# Patient Record
Sex: MALE | Race: WHITE | NOT HISPANIC OR LATINO | Employment: OTHER | URBAN - METROPOLITAN AREA
[De-identification: names, ages, dates, MRNs, and addresses within clinical notes are randomized per-mention and may not be internally consistent; named-entity substitution may affect disease eponyms.]

---

## 2019-05-25 ENCOUNTER — ANCILLARY PROCEDURE (OUTPATIENT)
Dept: GENERAL RADIOLOGY | Facility: CLINIC | Age: 64
End: 2019-05-25
Attending: FAMILY MEDICINE
Payer: COMMERCIAL

## 2019-05-25 ENCOUNTER — OFFICE VISIT (OUTPATIENT)
Dept: URGENT CARE | Facility: URGENT CARE | Age: 64
End: 2019-05-25
Payer: COMMERCIAL

## 2019-05-25 ENCOUNTER — TELEPHONE (OUTPATIENT)
Dept: URGENT CARE | Facility: URGENT CARE | Age: 64
End: 2019-05-25

## 2019-05-25 VITALS
TEMPERATURE: 96.9 F | BODY MASS INDEX: 25.06 KG/M2 | OXYGEN SATURATION: 97 % | WEIGHT: 169.7 LBS | HEART RATE: 73 BPM | SYSTOLIC BLOOD PRESSURE: 98 MMHG | DIASTOLIC BLOOD PRESSURE: 62 MMHG

## 2019-05-25 DIAGNOSIS — S93.504A SPRAIN OF FIFTH TOE OF RIGHT FOOT, INITIAL ENCOUNTER: ICD-10-CM

## 2019-05-25 DIAGNOSIS — M79.674 PAIN OF TOE OF RIGHT FOOT: ICD-10-CM

## 2019-05-25 DIAGNOSIS — M79.674 PAIN OF TOE OF RIGHT FOOT: Primary | ICD-10-CM

## 2019-05-25 DIAGNOSIS — L03.031 CELLULITIS OF FIFTH TOE, RIGHT: ICD-10-CM

## 2019-05-25 PROCEDURE — 99203 OFFICE O/P NEW LOW 30 MIN: CPT | Performed by: FAMILY MEDICINE

## 2019-05-25 PROCEDURE — 73660 X-RAY EXAM OF TOE(S): CPT | Mod: RT

## 2019-05-25 RX ORDER — IPRATROPIUM BROMIDE 17 UG/1
AEROSOL, METERED RESPIRATORY (INHALATION)
Refills: 0 | COMMUNITY
Start: 2019-03-26 | End: 2022-10-05

## 2019-05-25 RX ORDER — CEPHALEXIN 500 MG/1
500 CAPSULE ORAL 3 TIMES DAILY
Qty: 21 CAPSULE | Refills: 0 | Status: SHIPPED | OUTPATIENT
Start: 2019-05-25 | End: 2019-06-01

## 2019-05-25 RX ORDER — LATANOPROST 50 UG/ML
1 SOLUTION/ DROPS OPHTHALMIC EVERY EVENING
COMMUNITY
Start: 2019-05-24 | End: 2023-01-04

## 2019-05-26 NOTE — TELEPHONE ENCOUNTER
SUBJECTIVE:  The radiology report on the patient's May 25, 2019, right fifth toe X-rays showed no fracture.      PLAN:  Please notify patient of this normal result.      Bernard Hardin MD

## 2019-05-26 NOTE — PATIENT INSTRUCTIONS
Elevate the right foot above the level of the heart to decrease pain and swelling at the right pinky toe.      Place ice onto the painful right pinky toe.      Tylenol, Ibuprofen for pain relief.      The radiology report results are pending.  If the report finds a fracture, then tape the pinky toe to the neighboring toe.  You can apply ice to reduce swelling.      follow up with your primary care provider if not better in 2-3 weeks.

## 2019-05-26 NOTE — PROGRESS NOTES
SUBJECTIVE:  Chief Complaint   Patient presents with     Urgent Care     Toe Injury     jammed toe a few weeks ago, swollen little toe and pain on right foot TX: cindy oakes     Raúl Marie is a 63 year old male presents with a chief complaint of a persistently  swelling and pain at the right fifth toe ever since jamming that toe a few weeks ago.  There was initially purplish discoloration, but this purple color disappeared one day after applying herbs.    The injury occurred three weeks ago.   The injury happened while at home and while the patient was barefoot. How: Patient 's right foot was stepping over something and hit something.  .  The patient complained of moderate pain when pressure is applied and mild pain without pressure and has not had decreased ROM.  Pain exacerbated by applying pressure over the fifth toe.  .  Relieved by none. .  He treated it initially with herbs and Epsom salt soaks. This is  the first time this type of injury has occurred to this patient.     No pus/discharge/blood.      Past Medical History;    Glaucoma (open-angle)    Current Outpatient Medications   Medication Sig Dispense Refill     ATROVENT HFA 17 MCG/ACT inhaler   0     latanoprost (XALATAN) 0.005 % ophthalmic solution        Social History     Tobacco Use     Smoking status: Never Smoker     Smokeless tobacco: Never Used   Substance Use Topics     Alcohol use: Yes     Social History:  Patient cleans houses.      Family History:    Diabetes, Alzheimers, Dementia    ROS:    INTEGUMENTARY/SKIN: positive for redness, edema, tenderness at the right fifth toe.    MUSCULOSKELETAL: POSITIVE  for swelling, redness at the right fifth toe.      EXAM:   BP 98/62   Pulse 73   Temp 96.9  F (36.1  C) (Tympanic)   Wt 77 kg (169 lb 11.2 oz)   SpO2 97%   BMI 25.06 kg/m    Gen: healthy,alert,no distress  Extremity: Right fifth toe has erythema, swelling, warmth at the entire right toe.  .   There is not compromise to the distal  circulation.  ROM is normal at the right fifth toe.   GAIT:  within normal limits.     X-RAY was done.  I viewed all of the X-ray images during this clinic encounter.  X-rays of the right fifth toe showed a possible radiolucent area at the distal phalanx on one view.  The other views showed no obvious fractures nor dislocation.     ASSESSMENT:   Right Fifth Toe Pain.  Differential diagnosis includes sprain of the toe, possible fracture of the distal phalanx, cellulitis of the fifth toe.    PLAN:  The radiology report is pending.  If report shows fracture, then do anne taping for the next three weeks.      Apply ice  Elevate the right foot above the level of the heart  Tylenol, Ibuprofen  follow up with the primary care provider if not better in 2-3 weeks    Bernard Hardin MD

## 2019-05-26 NOTE — TELEPHONE ENCOUNTER
Called and spoke with patient who confirmed understanding.    Quirino Rey MA 11:01 AM 5/26/2019

## 2022-10-05 ENCOUNTER — APPOINTMENT (OUTPATIENT)
Dept: GENERAL RADIOLOGY | Facility: CLINIC | Age: 67
DRG: 394 | End: 2022-10-05
Attending: EMERGENCY MEDICINE
Payer: MEDICARE

## 2022-10-05 ENCOUNTER — HOSPITAL ENCOUNTER (INPATIENT)
Facility: CLINIC | Age: 67
LOS: 6 days | Discharge: HOME | DRG: 394 | End: 2022-10-11
Attending: EMERGENCY MEDICINE | Admitting: INTERNAL MEDICINE
Payer: MEDICARE

## 2022-10-05 DIAGNOSIS — L03.114 CELLULITIS OF LEFT UPPER EXTREMITY: Primary | ICD-10-CM

## 2022-10-05 DIAGNOSIS — K62.5 RECTAL BLEEDING: ICD-10-CM

## 2022-10-05 DIAGNOSIS — D64.9 ANEMIA, UNSPECIFIED TYPE: ICD-10-CM

## 2022-10-05 LAB
ABO/RH(D): NORMAL
ANION GAP SERPL CALCULATED.3IONS-SCNC: 5 MMOL/L (ref 3–14)
ANTIBODY SCREEN: NEGATIVE
BLD PROD TYP BPU: NORMAL
BLOOD COMPONENT TYPE: NORMAL
BUN SERPL-MCNC: 24 MG/DL (ref 7–30)
CALCIUM SERPL-MCNC: 8.5 MG/DL (ref 8.5–10.1)
CHLORIDE BLD-SCNC: 105 MMOL/L (ref 94–109)
CO2 SERPL-SCNC: 28 MMOL/L (ref 20–32)
CODING SYSTEM: NORMAL
CREAT SERPL-MCNC: 1.19 MG/DL (ref 0.66–1.25)
CROSSMATCH: NORMAL
ERYTHROCYTE [DISTWIDTH] IN BLOOD BY AUTOMATED COUNT: 18.9 % (ref 10–15)
FERRITIN SERPL-MCNC: 3 NG/ML (ref 26–388)
GFR SERPL CREATININE-BSD FRML MDRD: 67 ML/MIN/1.73M2
GLUCOSE BLD-MCNC: 101 MG/DL (ref 70–99)
HCT VFR BLD AUTO: 16.6 % (ref 40–53)
HGB BLD-MCNC: 4.6 G/DL (ref 13.3–17.7)
HOLD SPECIMEN: NORMAL
INR PPP: 1.1 (ref 0.85–1.15)
IRON SATN MFR SERPL: 2 % (ref 15–46)
IRON SERPL-MCNC: 8 UG/DL (ref 35–180)
ISSUE DATE AND TIME: NORMAL
MCH RBC QN AUTO: 19.1 PG (ref 26.5–33)
MCHC RBC AUTO-ENTMCNC: 27.7 G/DL (ref 31.5–36.5)
MCV RBC AUTO: 69 FL (ref 78–100)
PLATELET # BLD AUTO: 358 10E3/UL (ref 150–450)
POTASSIUM BLD-SCNC: 3.6 MMOL/L (ref 3.4–5.3)
RBC # BLD AUTO: 2.41 10E6/UL (ref 4.4–5.9)
SARS-COV-2 RNA RESP QL NAA+PROBE: NEGATIVE
SODIUM SERPL-SCNC: 138 MMOL/L (ref 133–144)
SPECIMEN EXPIRATION DATE: NORMAL
TIBC SERPL-MCNC: 494 UG/DL (ref 240–430)
TROPONIN I SERPL HS-MCNC: 4 NG/L
UNIT ABO/RH: NORMAL
UNIT NUMBER: NORMAL
UNIT STATUS: NORMAL
UNIT TYPE ISBT: 5100
WBC # BLD AUTO: 5.1 10E3/UL (ref 4–11)

## 2022-10-05 PROCEDURE — 86923 COMPATIBILITY TEST ELECTRIC: CPT | Performed by: INTERNAL MEDICINE

## 2022-10-05 PROCEDURE — 36415 COLL VENOUS BLD VENIPUNCTURE: CPT | Performed by: EMERGENCY MEDICINE

## 2022-10-05 PROCEDURE — 99285 EMERGENCY DEPT VISIT HI MDM: CPT | Mod: 25

## 2022-10-05 PROCEDURE — 120N000001 HC R&B MED SURG/OB

## 2022-10-05 PROCEDURE — 96374 THER/PROPH/DIAG INJ IV PUSH: CPT

## 2022-10-05 PROCEDURE — C9113 INJ PANTOPRAZOLE SODIUM, VIA: HCPCS | Performed by: EMERGENCY MEDICINE

## 2022-10-05 PROCEDURE — 250N000013 HC RX MED GY IP 250 OP 250 PS 637: Performed by: INTERNAL MEDICINE

## 2022-10-05 PROCEDURE — C9803 HOPD COVID-19 SPEC COLLECT: HCPCS

## 2022-10-05 PROCEDURE — 85610 PROTHROMBIN TIME: CPT | Performed by: EMERGENCY MEDICINE

## 2022-10-05 PROCEDURE — 71046 X-RAY EXAM CHEST 2 VIEWS: CPT

## 2022-10-05 PROCEDURE — 83550 IRON BINDING TEST: CPT | Performed by: EMERGENCY MEDICINE

## 2022-10-05 PROCEDURE — P9016 RBC LEUKOCYTES REDUCED: HCPCS | Performed by: EMERGENCY MEDICINE

## 2022-10-05 PROCEDURE — 80048 BASIC METABOLIC PNL TOTAL CA: CPT | Performed by: EMERGENCY MEDICINE

## 2022-10-05 PROCEDURE — 250N000011 HC RX IP 250 OP 636: Performed by: EMERGENCY MEDICINE

## 2022-10-05 PROCEDURE — 86923 COMPATIBILITY TEST ELECTRIC: CPT | Performed by: EMERGENCY MEDICINE

## 2022-10-05 PROCEDURE — 86901 BLOOD TYPING SEROLOGIC RH(D): CPT | Performed by: EMERGENCY MEDICINE

## 2022-10-05 PROCEDURE — 99223 1ST HOSP IP/OBS HIGH 75: CPT | Mod: AI | Performed by: INTERNAL MEDICINE

## 2022-10-05 PROCEDURE — 82728 ASSAY OF FERRITIN: CPT | Performed by: EMERGENCY MEDICINE

## 2022-10-05 PROCEDURE — 85027 COMPLETE CBC AUTOMATED: CPT | Performed by: EMERGENCY MEDICINE

## 2022-10-05 PROCEDURE — 36430 TRANSFUSION BLD/BLD COMPNT: CPT

## 2022-10-05 PROCEDURE — 84484 ASSAY OF TROPONIN QUANT: CPT | Performed by: EMERGENCY MEDICINE

## 2022-10-05 PROCEDURE — U0003 INFECTIOUS AGENT DETECTION BY NUCLEIC ACID (DNA OR RNA); SEVERE ACUTE RESPIRATORY SYNDROME CORONAVIRUS 2 (SARS-COV-2) (CORONAVIRUS DISEASE [COVID-19]), AMPLIFIED PROBE TECHNIQUE, MAKING USE OF HIGH THROUGHPUT TECHNOLOGIES AS DESCRIBED BY CMS-2020-01-R: HCPCS | Performed by: EMERGENCY MEDICINE

## 2022-10-05 RX ORDER — LATANOPROST 50 UG/ML
1 SOLUTION/ DROPS OPHTHALMIC DAILY
Status: DISCONTINUED | OUTPATIENT
Start: 2022-10-06 | End: 2022-10-06

## 2022-10-05 RX ORDER — ONDANSETRON 2 MG/ML
4 INJECTION INTRAMUSCULAR; INTRAVENOUS EVERY 6 HOURS PRN
Status: DISCONTINUED | OUTPATIENT
Start: 2022-10-05 | End: 2022-10-07

## 2022-10-05 RX ORDER — ACETAMINOPHEN 650 MG/1
650 SUPPOSITORY RECTAL EVERY 6 HOURS PRN
Status: DISCONTINUED | OUTPATIENT
Start: 2022-10-05 | End: 2022-10-11 | Stop reason: HOSPADM

## 2022-10-05 RX ORDER — TAMSULOSIN HYDROCHLORIDE 0.4 MG/1
0.4 CAPSULE ORAL DAILY
COMMUNITY

## 2022-10-05 RX ORDER — POLYETHYLENE GLYCOL 3350 17 G/17G
17 POWDER, FOR SOLUTION ORAL DAILY PRN
Status: DISCONTINUED | OUTPATIENT
Start: 2022-10-05 | End: 2022-10-11 | Stop reason: HOSPADM

## 2022-10-05 RX ORDER — TAMSULOSIN HYDROCHLORIDE 0.4 MG/1
0.4 CAPSULE ORAL AT BEDTIME
Status: DISCONTINUED | OUTPATIENT
Start: 2022-10-05 | End: 2022-10-11 | Stop reason: HOSPADM

## 2022-10-05 RX ORDER — ONDANSETRON 4 MG/1
4 TABLET, ORALLY DISINTEGRATING ORAL EVERY 6 HOURS PRN
Status: DISCONTINUED | OUTPATIENT
Start: 2022-10-05 | End: 2022-10-07

## 2022-10-05 RX ORDER — BISACODYL 10 MG
10 SUPPOSITORY, RECTAL RECTAL DAILY PRN
Status: DISCONTINUED | OUTPATIENT
Start: 2022-10-05 | End: 2022-10-11 | Stop reason: HOSPADM

## 2022-10-05 RX ORDER — ACETAMINOPHEN 325 MG/1
650 TABLET ORAL EVERY 6 HOURS PRN
Status: DISCONTINUED | OUTPATIENT
Start: 2022-10-05 | End: 2022-10-11 | Stop reason: HOSPADM

## 2022-10-05 RX ORDER — LIDOCAINE 40 MG/G
CREAM TOPICAL
Status: DISCONTINUED | OUTPATIENT
Start: 2022-10-05 | End: 2022-10-11 | Stop reason: HOSPADM

## 2022-10-05 RX ADMIN — PANTOPRAZOLE SODIUM 80 MG: 40 INJECTION, POWDER, FOR SOLUTION INTRAVENOUS at 19:46

## 2022-10-05 RX ADMIN — TAMSULOSIN HYDROCHLORIDE 0.4 MG: 0.4 CAPSULE ORAL at 22:46

## 2022-10-05 ASSESSMENT — ENCOUNTER SYMPTOMS
FATIGUE: 1
UNEXPECTED WEIGHT CHANGE: 1
SHORTNESS OF BREATH: 1
WEAKNESS: 1
FREQUENCY: 0
FEVER: 0
ABDOMINAL PAIN: 0
DYSURIA: 0
APPETITE CHANGE: 1
CHILLS: 0
COUGH: 1
BLOOD IN STOOL: 1

## 2022-10-05 ASSESSMENT — ACTIVITIES OF DAILY LIVING (ADL)
ADLS_ACUITY_SCORE: 35

## 2022-10-05 NOTE — ED PROVIDER NOTES
History   Chief Complaint:  Abnormal Labs       HPI   Raúl Marie is a 67 year old male with history of hemorrhoids and hyperlipidemia who presents with a hemoglobin of 4.2 drawn earlier today in urgent care. Per chart review he was seen there for two weeks of dyspnea, fatigue, and weakness. He also has had a history of bleeding hemorrhoids which have recurred over the past two weeks following 6 months without symptoms. These produce enough blood with bowel movements to fill the toilet, but he has only had very mild bleeding today. He has also had a recent loss of appetite and estimates he has lost about 7 pounds this month. He also reports a cough for the past 2 weeks and chest pain. He is also very pale appearing. He denies fever, chills, dysuria, frequency, or abdominal pain. He has been seeing a homeopath who as been changing his remedies for his hemorrhoids. He denies any tobacco, alcohol, or drug use.     [facility] urgent care labs 10/5/2022:  HGB: 4.2  MCV: 68.6    Review of Systems   Constitutional: Positive for appetite change, fatigue and unexpected weight change. Negative for chills and fever.   Respiratory: Positive for cough and shortness of breath.    Cardiovascular: Positive for chest pain.   Gastrointestinal: Positive for blood in stool. Negative for abdominal pain.   Genitourinary: Negative for dysuria and frequency.   Skin: Positive for pallor.   Neurological: Positive for weakness.   All other systems reviewed and are negative.      Allergies:  Chlorpheniramine-Phenylephrine    Medications:  Tamsulosin   Atrovent   Latanoprost   Cholecalciferol     Past Medical History:     Hyperlipidemia   Hemorrhoids   Primary open angle glaucoma  Moderate episode of major depressive disorder   Primary open angle glaucoma  Vitamin D deficiency   Glomerulonephritis  Chronic kidney disease stage 3   Bronchitis  IgA nephropathy   Shingles   Calculus of kidney     Past Surgical History:     Tonsillectomy and adenoidectomy   Appendectomy   Cysto with urethral dilation   MS laser surgery of eye   Kidney stone surgery     Family History:    Father: dementia, diabetes  Mother: dementia     Social History:  The patient presents to the ED with family member.  The patient presents to the ED by means of car.     Physical Exam     Patient Vitals for the past 24 hrs:   BP Temp Temp src Pulse Resp SpO2   10/05/22 1901 -- 97.4  F (36.3  C) Oral -- -- --   10/05/22 1740 130/62 -- -- 80 22 100 %       Physical Exam  Constitutional: Well developed, nontox appearance  Head: Atraumatic.   Mouth/Throat: Oropharynx is clear and moist.   Neck:  no stridor  Eyes: no scleral icterus  Cardiovascular: RRR, 2+ bilat radial pulses  Pulmonary/Chest: nml resp effort, Clear BS bilat  Abdominal: ND, soft, NT, no rebound or guarding   Rectal: no active bleeding externally or on CARINE, no melena  Ext: Warm, well perfused, no edema  Neurological: A&O, symmetric facies, moves ext x4  Skin: Skin is warm and dry.  Significant pallor  Psychiatric: Behavior is normal. Thought content normal.   Nursing note and vitals reviewed.    Emergency Department Course     ECG Urgent [facility]  ECG taken at 1459, ECG read at 1855  Normal sinus rhythm   Normal ECG    QRS: 102  QT/QTcBaz 386/442  MS: 180   P: 126  RR/PP: 754/759  P/QRS/T: 40/23/40      Imaging:  Chest XR,  PA & LAT   Final Result   IMPRESSION: Heart size and pulmonary vascularity normal. The lungs are clear. Mild thoracolumbar spinal curve.        Report per radiology    Laboratory:  Labs Ordered and Resulted from Time of ED Arrival to Time of ED Departure   BASIC METABOLIC PANEL - Abnormal       Result Value    Sodium 138      Potassium 3.6      Chloride 105      Carbon Dioxide (CO2) 28      Anion Gap 5      Urea Nitrogen 24      Creatinine 1.19      Calcium 8.5      Glucose 101 (*)     GFR Estimate 67     CBC WITH PLATELETS AND REFLEX TO IRON STUDIES - Abnormal    WBC Count  5.1      RBC Count 2.41 (*)     Hemoglobin 4.6 (*)     Hematocrit 16.6 (*)     MCV 69 (*)     MCH 19.1 (*)     MCHC 27.7 (*)     RDW 18.9 (*)     Platelet Count 358     INR - Normal    INR 1.10     TROPONIN I - Normal    Troponin I High Sensitivity 4     IRON AND IRON BINDING CAPACITY   FERRITIN   COVID-19 VIRUS (CORONAVIRUS) BY PCR   TYPE AND SCREEN, ADULT    ABO/RH(D) O POS      Antibody Screen Negative      SPECIMEN EXPIRATION DATE      PREPARE RED BLOOD CELLS (UNIT)    Blood Component Type Red Blood Cells      Product Code D2130C27      Unit Status Ready for issue      Unit Number [id number]      CROSSMATCH Compatible      CODING SYSTEM [id number]     PREPARE RED BLOOD CELLS (UNIT)    Blood Component Type Red Blood Cells      Product Code S5367M17      Unit Status Ready for issue      Unit Number [id number]      CROSSMATCH Compatible      CODING SYSTEM [id number]     PREPARE RED BLOOD CELLS (UNIT)    Blood Component Type Red Blood Cells      Product Code V5465X47      Unit Status Transfused      Unit Number [id number]      CROSSMATCH Compatible      CODING SYSTEM [id number]      ISSUE DATE AND TIME [id number]      UNIT ABO/RH O+      UNIT TYPE ISBT 5100     PREPARE RED BLOOD CELLS (UNIT)   TRANSFUSE RED BLOOD CELLS (UNIT)   ABO/RH TYPE AND SCREEN   CBC WITH PLATELETS AND REFLEX TO IRON STUDIES        Emergency Department Course:  Reviewed:  I reviewed nursing notes, vitals, past medical history and Care Everywhere    Assessments:   I obtained history and examined the patient as noted above.    I rechecked the patient and performed rectal exam.     Consults:   I consulted the hospitalist Dr. Casey regarding admission to the hospital.     Interventions:   Pantoprazole 80 mg IV    Disposition:  The patient was admitted to the hospital under the care of Dr. Casey.     Impression & Plan     Medical Decision Makin year old male presenting w/ anemia noted on outpatient  labs     DDx includes iron deficiency anemia, anemia NOS, lower GI bleed, external hemorrhoids.  Labs significant for severe anemia.  Patient was consented for blood transfusion which was ordered in the emergency department.  He is subsequently admitted to the hospital service for further evaluation management including likely GI consult.  PPI infusion ordered after discussion with hospitalist.  Patient counseled on all results, disposition and diagnosis.  They are understanding and agreeable to plan. Patient admitted in guarded condition.      Critical care time: 30 minutes excluding procedures    Diagnosis:    ICD-10-CM    1. Anemia, unspecified type  D64.9    2. Rectal bleeding  K62.5        Scribe Disclosure:  I, Joshua Kjer, am serving as a scribe at 6:15 PM on 10/5/2022 to document services personally performed by Danny Che MD based on my observations and the provider's statements to me.            Danny Che MD  10/05/22 2046

## 2022-10-06 LAB
ERYTHROCYTE [DISTWIDTH] IN BLOOD BY AUTOMATED COUNT: 20.6 % (ref 10–15)
HCT VFR BLD AUTO: 23 % (ref 40–53)
HGB BLD-MCNC: 7.1 G/DL (ref 13.3–17.7)
HGB BLD-MCNC: 7.4 G/DL (ref 13.3–17.7)
HGB BLD-MCNC: 7.4 G/DL (ref 13.3–17.7)
MCH RBC QN AUTO: 22.8 PG (ref 26.5–33)
MCHC RBC AUTO-ENTMCNC: 30.9 G/DL (ref 31.5–36.5)
MCV RBC AUTO: 74 FL (ref 78–100)
PLATELET # BLD AUTO: 294 10E3/UL (ref 150–450)
POTASSIUM BLD-SCNC: 4 MMOL/L (ref 3.4–5.3)
RBC # BLD AUTO: 3.11 10E6/UL (ref 4.4–5.9)
WBC # BLD AUTO: 6.1 10E3/UL (ref 4–11)

## 2022-10-06 PROCEDURE — P9016 RBC LEUKOCYTES REDUCED: HCPCS | Performed by: EMERGENCY MEDICINE

## 2022-10-06 PROCEDURE — 36415 COLL VENOUS BLD VENIPUNCTURE: CPT | Performed by: INTERNAL MEDICINE

## 2022-10-06 PROCEDURE — 84132 ASSAY OF SERUM POTASSIUM: CPT | Performed by: INTERNAL MEDICINE

## 2022-10-06 PROCEDURE — 85018 HEMOGLOBIN: CPT | Performed by: PHYSICIAN ASSISTANT

## 2022-10-06 PROCEDURE — 250N000011 HC RX IP 250 OP 636: Performed by: INTERNAL MEDICINE

## 2022-10-06 PROCEDURE — 250N000013 HC RX MED GY IP 250 OP 250 PS 637: Performed by: PHYSICIAN ASSISTANT

## 2022-10-06 PROCEDURE — 250N000013 HC RX MED GY IP 250 OP 250 PS 637: Performed by: INTERNAL MEDICINE

## 2022-10-06 PROCEDURE — 85014 HEMATOCRIT: CPT | Performed by: INTERNAL MEDICINE

## 2022-10-06 PROCEDURE — 99232 SBSQ HOSP IP/OBS MODERATE 35: CPT | Performed by: INTERNAL MEDICINE

## 2022-10-06 PROCEDURE — 36415 COLL VENOUS BLD VENIPUNCTURE: CPT | Performed by: PHYSICIAN ASSISTANT

## 2022-10-06 PROCEDURE — 120N000001 HC R&B MED SURG/OB

## 2022-10-06 PROCEDURE — C9113 INJ PANTOPRAZOLE SODIUM, VIA: HCPCS | Performed by: INTERNAL MEDICINE

## 2022-10-06 RX ORDER — NALOXONE HYDROCHLORIDE 0.4 MG/ML
0.4 INJECTION, SOLUTION INTRAMUSCULAR; INTRAVENOUS; SUBCUTANEOUS
Status: DISCONTINUED | OUTPATIENT
Start: 2022-10-06 | End: 2022-10-11 | Stop reason: HOSPADM

## 2022-10-06 RX ORDER — NALOXONE HYDROCHLORIDE 0.4 MG/ML
0.2 INJECTION, SOLUTION INTRAMUSCULAR; INTRAVENOUS; SUBCUTANEOUS
Status: DISCONTINUED | OUTPATIENT
Start: 2022-10-06 | End: 2022-10-11 | Stop reason: HOSPADM

## 2022-10-06 RX ORDER — POLYETHYLENE GLYCOL 3350 17 G/17G
238 POWDER, FOR SOLUTION ORAL ONCE
Status: COMPLETED | OUTPATIENT
Start: 2022-10-06 | End: 2022-10-06

## 2022-10-06 RX ORDER — LATANOPROST 50 UG/ML
1 SOLUTION/ DROPS OPHTHALMIC AT BEDTIME
Status: DISCONTINUED | OUTPATIENT
Start: 2022-10-06 | End: 2022-10-11 | Stop reason: HOSPADM

## 2022-10-06 RX ORDER — OXYCODONE HYDROCHLORIDE 5 MG/1
5 TABLET ORAL EVERY 4 HOURS PRN
Status: DISCONTINUED | OUTPATIENT
Start: 2022-10-06 | End: 2022-10-11 | Stop reason: HOSPADM

## 2022-10-06 RX ORDER — BISACODYL 5 MG
10 TABLET, DELAYED RELEASE (ENTERIC COATED) ORAL ONCE
Status: COMPLETED | OUTPATIENT
Start: 2022-10-06 | End: 2022-10-06

## 2022-10-06 RX ADMIN — PANTOPRAZOLE SODIUM 40 MG: 40 INJECTION, POWDER, FOR SOLUTION INTRAVENOUS at 10:03

## 2022-10-06 RX ADMIN — ACETAMINOPHEN 650 MG: 325 TABLET, FILM COATED ORAL at 19:55

## 2022-10-06 RX ADMIN — LATANOPROST 1 DROP: 50 SOLUTION/ DROPS OPHTHALMIC at 21:57

## 2022-10-06 RX ADMIN — POLYETHYLENE GLYCOL 3350 238 G: 17 POWDER, FOR SOLUTION ORAL at 16:59

## 2022-10-06 RX ADMIN — TAMSULOSIN HYDROCHLORIDE 0.4 MG: 0.4 CAPSULE ORAL at 19:55

## 2022-10-06 RX ADMIN — BISACODYL 10 MG: 5 TABLET, COATED ORAL at 11:15

## 2022-10-06 RX ADMIN — ANORECTAL OINTMENT: 15.7; .44; 24; 20.6 OINTMENT TOPICAL at 19:55

## 2022-10-06 RX ADMIN — PANTOPRAZOLE SODIUM 40 MG: 40 INJECTION, POWDER, FOR SOLUTION INTRAVENOUS at 19:43

## 2022-10-06 RX ADMIN — ACETAMINOPHEN 650 MG: 325 TABLET, FILM COATED ORAL at 13:14

## 2022-10-06 ASSESSMENT — ACTIVITIES OF DAILY LIVING (ADL)
CONCENTRATING,_REMEMBERING_OR_MAKING_DECISIONS_DIFFICULTY: NO
ADLS_ACUITY_SCORE: 35
ADLS_ACUITY_SCORE: 31
ADLS_ACUITY_SCORE: 35
ADLS_ACUITY_SCORE: 18
DIFFICULTY_EATING/SWALLOWING: NO
ADLS_ACUITY_SCORE: 31
WALKING_OR_CLIMBING_STAIRS_DIFFICULTY: NO
ADLS_ACUITY_SCORE: 18
ADLS_ACUITY_SCORE: 35
DRESSING/BATHING_DIFFICULTY: NO
WEAR_GLASSES_OR_BLIND: NO
CHANGE_IN_FUNCTIONAL_STATUS_SINCE_ONSET_OF_CURRENT_ILLNESS/INJURY: NO
DOING_ERRANDS_INDEPENDENTLY_DIFFICULTY: NO
ADLS_ACUITY_SCORE: 18
ADLS_ACUITY_SCORE: 35
TOILETING_ISSUES: NO
FALL_HISTORY_WITHIN_LAST_SIX_MONTHS: NO
ADLS_ACUITY_SCORE: 18

## 2022-10-06 NOTE — ED NOTES
[facility]  ED Nurse Handoff Report    ED Chief complaint: Abnormal Labs      ED Diagnosis:   Final diagnoses:   Anemia, unspecified type   Rectal bleeding       Code Status: Full Code    Allergies:   Allergies   Allergen Reactions     Chlorpheniramine-Phenylephrine Itching and Other (See Comments)     Other reaction(s): Other (See Comments)     Walnuts [Nuts]      Wheat Extract        Patient Story: Patient presents to ER from clinic. Patient has been seeing a holistic doctor related to rectal bleeding, which temporarily resolved. For the last 3 weeks patient has been experencing rectal pain and bleeding. Hgb in clinic 4.2. Hgb in ED 4.6    Focused Assessment:  Patient is AOx4, very pleasant. Complains of pain in rectum that is being managed with repositioning. Patient denies SOB or nausea. Pale in color. Moving SBA. VSS. 3 units of blood.     Treatments and/or interventions provided:   Labs Ordered and Resulted from Time of ED Arrival to Time of ED Departure   BASIC METABOLIC PANEL - Abnormal       Result Value    Sodium 138      Potassium 3.6      Chloride 105      Carbon Dioxide (CO2) 28      Anion Gap 5      Urea Nitrogen 24      Creatinine 1.19      Calcium 8.5      Glucose 101 (*)     GFR Estimate 67     CBC WITH PLATELETS AND REFLEX TO IRON STUDIES - Abnormal    WBC Count 5.1      RBC Count 2.41 (*)     Hemoglobin 4.6 (*)     Hematocrit 16.6 (*)     MCV 69 (*)     MCH 19.1 (*)     MCHC 27.7 (*)     RDW 18.9 (*)     Platelet Count 358     INR - Normal    INR 1.10     TROPONIN I - Normal    Troponin I High Sensitivity 4     IRON AND IRON BINDING CAPACITY   FERRITIN   COVID-19 VIRUS (CORONAVIRUS) BY PCR   TYPE AND SCREEN, ADULT    ABO/RH(D) O POS      Antibody Screen Negative      SPECIMEN EXPIRATION DATE [id number]     PREPARE RED BLOOD CELLS (UNIT)    Blood Component Type Red Blood Cells      Product Code S6529B50      Unit Status Ready for issue      Unit Number [id number]       CROSSMATCH Compatible      CODING SYSTEM [id number]     PREPARE RED BLOOD CELLS (UNIT)    Blood Component Type Red Blood Cells      Product Code C1044A26      Unit Status Ready for issue      Unit Number [id number]      CROSSMATCH Compatible      CODING SYSTEM [id number]     PREPARE RED BLOOD CELLS (UNIT)    Blood Component Type Red Blood Cells      Product Code G8612A89      Unit Status Transfused      Unit Number [id number]      CROSSMATCH Compatible      CODING SYSTEM [id number]      ISSUE DATE AND TIME [id number]      UNIT ABO/RH O+      UNIT TYPE ISBT 5100     PREPARE RED BLOOD CELLS (UNIT)   TRANSFUSE RED BLOOD CELLS (UNIT)   ABO/RH TYPE AND SCREEN   CBC WITH PLATELETS AND REFLEX TO IRON STUDIES     Chest XR,  PA & LAT   Final Result   IMPRESSION: Heart size and pulmonary vascularity normal. The lungs are clear. Mild thoracolumbar spinal curve.            To be done/followed up on inpatient unit:  GI consult     Does this patient have any cognitive concerns?: None    Activity level - Baseline/Home:  Independent  Activity Level - Current:   Stand with Assist    Patient's Preferred language: English   Needed?: No    Isolation: None  Infection: Not Applicable  Patient tested for COVID 19 prior to admission: YES  Bariatric?: No    Vital Signs:   Vitals:    10/05/22 1740 10/05/22 1901   BP: 130/62    Pulse: 80    Resp: 22    Temp:  97.4  F (36.3  C)   TempSrc:  Oral   SpO2: 100%        Cardiac Rhythm:     Was the PSS-3 completed:   Yes    Family Comments: Family at bedside   OBS brochure/video discussed/provided to patient/family: No    For the majority of the shift this patient's behavior was Green.   Behavioral interventions performed were Care explained .    ED NURSE PHONE NUMBER: [phone]

## 2022-10-06 NOTE — PLAN OF CARE
Goal Outcome Evaluation:    Patient transferred from ED @ 0830. A&O x4. Up independent in room. Denies chest pain or SOB. Complains of rectal discomfort, requested tylenol. On Clear liquid. NPO after midnight. Had few BM this shift. Hemoglobin check every 6 hours. Will continue to monitor.

## 2022-10-06 NOTE — UTILIZATION REVIEW
"  Admission Status; Secondary Review Determination         Under the authority of the Utilization Management Committee, the utilization review process indicated a secondary review on the above patient.  The review outcome is based on review of the medical records, discussions with staff, and applying clinical experience noted on the date of the review.        (X)      Inpatient Status Appropriate - This patient's medical care is consistent with medical management for inpatient care and reasonable inpatient medical practice.      () Observation Status Appropriate - This patient does not meet hospital inpatient criteria and is placed in observation status. If this patient's primary payer is Medicare and was admitted as an inpatient, Condition Code 44 should be used and patient status changed to \"observation\".   () Admission Status NOT Appropriate - This patient's medical care is not consistent with medical management for Inpatient or Observation Status.          RATIONALE FOR DETERMINATION     67 year old male with history of BPH, IgA nephropathy, glaucoma who presented 10/5 with light-headedness, fatigue, dyspnea and chest pain on exertion.  He has a history of intermittent bright red blood per rectum over the past 4 years which has worsened over the past 2 to 3 weeks.  Patient was found to have a hemoglobin of 4.6.  Patient was transfused with 3 units of packed red blood cells.  His hemoglobin improved to 7.1.  Serial hemoglobins will continue.  GI has been consulted.  EGD and colonoscopy will be performed.  He is currently on IV pantoprazole.  Patient is having rectal pain requiring pain management.  He will continue to require hospitalization.  He is appropriate for inpatient status.    The severity of illness, intensity of service provided, expected LOS and risk for adverse outcome make the care complex, high risk and appropriate for hospital admission.        The information on this document is developed by the " utilization review team in order for the business office to ensure compliance.  This only denotes the appropriateness of proper admission status and does not reflect the quality of care rendered.         The definitions of Inpatient Status and Observation Status used in making the determination above are those provided in the CMS Coverage Manual, Chapter 1 and Chapter 6, section 70.4.      Sincerely,     Raúl Floyd MD  Physician Advisor  Utilization Review/ Case Management  [facility].

## 2022-10-06 NOTE — ED NOTES
Patient resting comfortably in bed with eyes covered with washcloth for darkness. On bag 3 of 3 for transfusion for Hgb of 4.6. Patient is very calm, cooperative and pleasant. Denies any pain, nausea, sob or transfusion reactions. No needs at this time. Tele SR. NPO at midnight.

## 2022-10-06 NOTE — PROVIDER NOTIFICATION
Patient c/o rectal/anal pain not relieved with po tylenol. Message sent to  for increase pain meds or topical med for comfort. Awaiting call back.

## 2022-10-06 NOTE — CONSULTS
[facility]  Gastroenterology Consultation         Raúl Marie  [address]  [address]  [place] [address]  67 year old male    Admission Date/Time: 10/5/2022  Primary Care Provider: Steve Garcia  Referring / Attending Physician:  Dr. Mateo Casey    We were asked to see the patient in consultation by Dr. Mateo Casey for evaluation of bright red bleeding per rectum.      CC: bright red bleeding per rectum    HPI:  Raúl Marie is a 67 year old male who has a past medical history of BPH, IgA nephropathy, glaucoma who presented with light-headedness, fatigue, dyspnea and chest pain with intermittent bright red blood per rectum on 10/5/2022. Patient reports he has had bright red blood per rectum that has been intermittent over last 4 years. He has been seen by a homeopath and given a liquid tincture of Thuya. However after starting the bleeding has become progressively worse with increase in amount and frequency of blood over last 2-3 weeks. Also has significant rectal pain all day and worse with a bowel movement and has had mild epigastric discomfort.    He reports fatigue, lightheaded, dizziness, head throbbin, shortness of breath, and chest pressure all worse with walking. He presented to urgent care and found to have a hemoglobin of 4.6. He drinks alcohol sporadically and has had none in weks. Has had a prior unremarkable colonoscopy 10 years ago and a negative Cologuard in last 2 years. He has occasional epigastric pain but has no abdominal pain currently. Has had no heartburn, dysphagia, diarrhea, melena, hematuria or constipation. He denies use of NSAIDs or anticoagulation.    He has received 3 units of PRBC and hemoglobin improved to 7.1. INR 1.1, Platelets 294. Chest xray unremarkable.    ROS: A comprehensive ten point review of systems was negative aside from those in mentioned in the HPI.      PAST MED HX:  I have reviewed this patient's medical history and  updated it with pertinent information if needed.   Past Medical History:   Diagnosis Date     BPH (benign prostatic hyperplasia)      Glaucoma      IgA nephropathy        MEDICATIONS:   Prior to Admission Medications   Prescriptions Last Dose Informant Patient Reported? Taking?   latanoprost (XALATAN) 0.005 % ophthalmic solution 10/4/2022 at Unknown time  Yes Yes   Sig: Place 1 drop into both eyes daily   tamsulosin (FLOMAX) 0.4 MG capsule 10/4/2022 at Unknown time  Yes Yes   Sig: Take 0.4 mg by mouth daily      Facility-Administered Medications: None       ALLERGIES:   Allergies   Allergen Reactions     Chlorpheniramine-Phenylephrine Itching and Other (See Comments)     Other reaction(s): Other (See Comments)     Walnuts [Nuts]      Wheat Extract        SOCIAL HISTORY:  Social History     Tobacco Use     Smoking status: Never Smoker     Smokeless tobacco: Never Used   Substance Use Topics     Alcohol use: Yes     Drug use: Yes     Types: Marijuana       FAMILY HISTORY:  Family History   Problem Relation Age of Onset     Diabetes Father      Colon Cancer Paternal Grandmother        PHYSICAL EXAM:   General  Alert, oriented, and comfortable  Vital Signs with Ranges  Temp: 97.7  F (36.5  C) Temp src: Oral BP: 125/62 Pulse: 71   Resp: 15 SpO2: 97 % O2 Device: None (Room air)    I/O last 3 completed shifts:  In: 955   Out: -     Constitutional: healthy, alert and no distress   Cardiovascular: negative, PMI normal. No lifts, heaves, or thrills. RRR. No murmurs, clicks gallops or rub  Respiratory: negative, Percussion normal. Good diaphragmatic excursion. Lungs clear  Abdomen: Abdomen soft, mildly tender in epigastric area. BS normal. No masses, organomegaly          ADDITIONAL COMMENTS:   I reviewed the patient's new clinical lab test results.   Recent Labs   Lab Test 10/06/22  0516 10/05/22  1802   WBC 6.1 5.1   HGB 7.1* 4.6*   MCV 74* 69*    358   INR  --  1.10     Recent Labs   Lab Test 10/05/22  1802    POTASSIUM 3.6   CHLORIDE 105   CO2 28   BUN 24   ANIONGAP 5     No lab results found.    Invalid input(s): ALKPHOSPH    I reviewed the patient's new imaging results.        CONSULTATION ASSESSMENT AND PLAN:    Raúl Marie is a 67 year old male with a PMHx of BPH, IgA nephropathy, glaucoma whom presented with severe anemia with hemoglobin of 4.6 and bright red rectal bleeding.    Rectal bleeding  Anemia, unspecified type  Rectal pain  Has had intermittent rectal bleeding for years and now has rectal pain and symptomatic anemia.  Hemoglobin 4.6 and has improved to 7.1 after 3 units of PRBC  Last colonoscopy 10 years ago and unremarkable per patient  Symptoms suggestive of hemorrhoidal bleed, anal fissure, diverticular bleed vs anal or rectal neoplasm.    -- Plan EGD and colonoscopy tomorrow  -- Serial hemoglobin and transfuse with hemoglobin of 7 or less  -- IV pantoprazole 40 mg BID  -- clear liquid diet and NPO at midnight for procedures tomorrow  -- start colon prep this afternoon      [facility].  Office: [phone]  Cell : [phone] (Dr. Sommer)  Cell: [phone] (Alison Trevizo PA-C)

## 2022-10-06 NOTE — PHARMACY-ADMISSION MEDICATION HISTORY
Pharmacy Medication History  Admission medication history interview status for the 10/5/2022  admission is complete. See EPIC admission navigator for prior to admission medications     Location of Interview: Patient room  Medication history sources: Patient    Significant changes made to the medication list:    In the past week, patient estimated taking medication this percent of the time: greater than 90%    Additional medication history information:     Medication reconciliation completed by provider prior to medication history? No    Time spent in this activity: 10 minutes     Prior to Admission medications    Medication Sig Last Dose Taking? Auth Provider Long Term End Date   latanoprost (XALATAN) 0.005 % ophthalmic solution Place 1 drop into both eyes daily 10/4/2022 at Unknown time Yes Reported, Patient Yes    tamsulosin (FLOMAX) 0.4 MG capsule Take 0.4 mg by mouth daily 10/4/2022 at Unknown time Yes Unknown, Entered By History         The information provided in this note is only as accurate as the sources available at the time of update(s)

## 2022-10-06 NOTE — H&P
[facility]    History and Physical - Hospitalist Service       Date of Admission:  10/5/2022    Assessment & Plan   Raúl Marie is a 67 year old male with history of BPH, IgA nephropathy, glaucoma who presents with light-headedness, fatigue, dyspnea and chest pain on exertion in setting of 4 years of intermittent bright red blood per rectum. Admitted on 10/5/2022.    Acute on chronic gastrointestinal bleed, suspect lower   Iron deficiency anemia, severe  Acute blood loss anemia, symptomatic  *Presents with light-headedness, fatigue, dyspnea and chest pain on exertion in setting of 4 years of intermittent bright red blood per rectum, worsening over the last 2-3 weeks. Reports hx of hemorrhoids.   *Has been managing with a homeopath with an oral tincture treatment (Evaristo)   *Hemoglobin 4.6 g/dl, ferritin 3, iron sat low, iron binding capacity high  *Denies NSAID use, denies melena  *Hemodynamically stable, severe iron deficiency suggests likely slow blood loss over rapid bleeding  - GI ([facility]) consulted  - Consented for blood in ED  - Conditional transfusion for Hgb <7 g/dl  - Serial hemoglobin  - NPO at midnight   - Continue IV PPI BID    BPH  - Resume prior to admission tamsulosin     Dry cough  CXR clear   - Symptomatic management    Glaucoma  - Continue prior to admission eye drops        Diet:  NPO at midnight  DVT Prophylaxis: Pneumatic Compression Devices  Loredo Catheter: Not present  Code Status:   Full code    Disposition Plan   Admit to inpatient. Anticipate greater than or equal to 2 midnights prior to discharge.      Entered: Mateo Casey MD 10/05/2022, 9:43 PM     The patient's care was discussed with the patient and patient's significant other     Clinically Significant Risk Factors Present on Admission                               Mateo Casey MD  [facility]    ______________________________________________________________________    Chief Complaint   Light-headedness, fatigue, dyspnea and chest pain on exertion     History of Present Illness   Raúl Marie is a 67 year old male who presents with the above chief complaint.    History is obtained from the patient. The patient reports he has a long-standing history intermittent of bright red blood per rectum dating back for years.  He had a period of no bleeding over the winter/spring months, when it recurred again he was seen by homeopath who prescribed him a liquid tincture (Thuya).  He continued to have intermittent bleeding, with this dose titrated which actually seemed to worsen the bleeding when increased, over the past 2 to 3 weeks he has noted a significant increase in the amount of blood and has developed lightheadedness particularly with position changes, fatigue, shortness of breath and chest pressure with activity.  This finally prompted him to seek care at an urgent care, where his hemoglobin was noted to be in the 4 g/dL range and he was referred to the emergency department for further evaluation.  He reports he typically has bright red blood or blood clots, denies any black stools, tarry stools.  He does not use NSAIDs.  He drinks alcohol sporadically.  He reports a colonoscopy about 10 years ago, most recently had Cologuard around 2 years ago.  He has had some occasional mild epigastric pain associated with this, no abdominal pain currently. Additionally, he reports a mild dry cough for the last 2 weeks or so. Denies any fevers or chills.     In the Emergency Department, the patient was seen by Dr. Che, with whom I discussed the patient's presenting symptoms and emergency department course.  Initial vital signs were a temperature of 97.4F, HR 80, /62, RR 22, SpO2 100% on room air. Work-up included Hgb 4.6 g/dl. 3 units pRBC ordered and Hospitalists were contacted for admission to the  \[facility]\"".     Review of Systems    Complete 10 point review of systems assessed and negative except as noted in HPI.    Past Medical History    I have reviewed this patient's medical history and updated it with pertinent information if needed.   Past Medical History:   Diagnosis Date     BPH (benign prostatic hyperplasia)      Glaucoma      IgA nephropathy        Past Surgical History   I have reviewed this patient's surgical history and updated it with pertinent information if needed.  Past Surgical History:   Procedure Laterality Date     APPENDECTOMY           Social History   I have reviewed this patient's social history and updated it with pertinent information if needed.  Social History     Tobacco Use     Smoking status: Never Smoker     Smokeless tobacco: Never Used   Substance Use Topics     Alcohol use: Yes     Drug use: Yes     Types: Marijuana     Lives in [place]     Family History   I have reviewed this patient's family history and updated it with pertinent information if needed.   Family History   Problem Relation Age of Onset     Diabetes Father      Colon Cancer Paternal Grandmother         Prior to Admission Medications   Prior to Admission Medications   Prescriptions Last Dose Informant Patient Reported? Taking?   latanoprost (XALATAN) 0.005 % ophthalmic solution 10/4/2022 at Unknown time  Yes Yes   Sig: Place 1 drop into both eyes daily   tamsulosin (FLOMAX) 0.4 MG capsule 10/4/2022 at Unknown time  Yes Yes   Sig: Take 0.4 mg by mouth daily      Facility-Administered Medications: None     Allergies   Allergies   Allergen Reactions     Chlorpheniramine-Phenylephrine Itching and Other (See Comments)     Other reaction(s): Other (See Comments)     Walnuts [Nuts]      Wheat Extract        Physical Exam   Vital Signs: Temp: 98  F (36.7  C) Temp src: Oral BP: 119/67 Pulse: 76   Resp: 15 SpO2: 98 %      Weight: 0 lbs 0 oz    Constitutional: Well-appearing, NAD  Eyes: PERRL, EOMI  HENT: Oropharynx  clear, MMM  Respiratory: Clear to auscultation bilaterally, good air movement, normal effort   Cardiovascular: RRR, no m/r/g. No peripheral edema.   GI: Soft, non-tender, non-distended. No rebound tenderness or guarding.    Skin: Pale. Warm, dry   Neurologic: Alert. Responding to questions appropriately. Following commands.    Psychiatric: Normal affect, appropriate      Data   Data reviewed today: I reviewed all medications, new labs and imaging results over the last 24 hours. I personally reviewed no images or EKG's today.    Recent Labs   Lab 10/05/22  1802   WBC 5.1   HGB 4.6*   MCV 69*      INR 1.10      POTASSIUM 3.6   CHLORIDE 105   CO2 28   BUN 24   CR 1.19   ANIONGAP 5   SONA 8.5   *       Recent Results (from the past 24 hour(s))   Chest XR,  PA & LAT    Narrative    EXAM: XR CHEST 2 VIEWS  LOCATION: [facility]  DATE/TIME: 10/5/2022 7:31 PM    INDICATION: cough  COMPARISON: 1/11/2011      Impression    IMPRESSION: Heart size and pulmonary vascularity normal. The lungs are clear. Mild thoracolumbar spinal curve.

## 2022-10-07 LAB
ANION GAP SERPL CALCULATED.3IONS-SCNC: 5 MMOL/L (ref 3–14)
BLD PROD TYP BPU: NORMAL
BLOOD COMPONENT TYPE: NORMAL
BUN SERPL-MCNC: 14 MG/DL (ref 7–30)
CALCIUM SERPL-MCNC: 8.5 MG/DL (ref 8.5–10.1)
CHLORIDE BLD-SCNC: 112 MMOL/L (ref 94–109)
CO2 SERPL-SCNC: 26 MMOL/L (ref 20–32)
CODING SYSTEM: NORMAL
COLONOSCOPY: NORMAL
CREAT SERPL-MCNC: 1.18 MG/DL (ref 0.66–1.25)
CROSSMATCH: NORMAL
ERYTHROCYTE [DISTWIDTH] IN BLOOD BY AUTOMATED COUNT: 20.4 % (ref 10–15)
GFR SERPL CREATININE-BSD FRML MDRD: 68 ML/MIN/1.73M2
GLUCOSE BLD-MCNC: 98 MG/DL (ref 70–99)
HCT VFR BLD AUTO: 24.3 % (ref 40–53)
HGB BLD-MCNC: 6.8 G/DL (ref 13.3–17.7)
HGB BLD-MCNC: 7.3 G/DL (ref 13.3–17.7)
HGB BLD-MCNC: 7.5 G/DL (ref 13.3–17.7)
HGB BLD-MCNC: 7.5 G/DL (ref 13.3–17.7)
HGB BLD-MCNC: 8.1 G/DL (ref 13.3–17.7)
HGB BLD-MCNC: 8.5 G/DL (ref 13.3–17.7)
ISSUE DATE AND TIME: NORMAL
MCH RBC QN AUTO: 23.1 PG (ref 26.5–33)
MCHC RBC AUTO-ENTMCNC: 30.9 G/DL (ref 31.5–36.5)
MCV RBC AUTO: 75 FL (ref 78–100)
PLATELET # BLD AUTO: 264 10E3/UL (ref 150–450)
POTASSIUM BLD-SCNC: 4 MMOL/L (ref 3.4–5.3)
RBC # BLD AUTO: 3.24 10E6/UL (ref 4.4–5.9)
SODIUM SERPL-SCNC: 143 MMOL/L (ref 133–144)
UNIT ABO/RH: NORMAL
UNIT NUMBER: NORMAL
UNIT STATUS: NORMAL
UNIT TYPE ISBT: 5100
UPPER GI ENDOSCOPY: NORMAL
WBC # BLD AUTO: 5.4 10E3/UL (ref 4–11)

## 2022-10-07 PROCEDURE — 120N000001 HC R&B MED SURG/OB

## 2022-10-07 PROCEDURE — G0500 MOD SEDAT ENDO SERVICE >5YRS: HCPCS | Performed by: INTERNAL MEDICINE

## 2022-10-07 PROCEDURE — 45380 COLONOSCOPY AND BIOPSY: CPT | Performed by: INTERNAL MEDICINE

## 2022-10-07 PROCEDURE — 36415 COLL VENOUS BLD VENIPUNCTURE: CPT | Performed by: INTERNAL MEDICINE

## 2022-10-07 PROCEDURE — C9113 INJ PANTOPRAZOLE SODIUM, VIA: HCPCS | Performed by: INTERNAL MEDICINE

## 2022-10-07 PROCEDURE — 88305 TISSUE EXAM BY PATHOLOGIST: CPT | Mod: TC | Performed by: INTERNAL MEDICINE

## 2022-10-07 PROCEDURE — 258N000003 HC RX IP 258 OP 636: Performed by: HOSPITALIST

## 2022-10-07 PROCEDURE — 85027 COMPLETE CBC AUTOMATED: CPT | Performed by: INTERNAL MEDICINE

## 2022-10-07 PROCEDURE — 0DBM8ZX EXCISION OF DESCENDING COLON, VIA NATURAL OR ARTIFICIAL OPENING ENDOSCOPIC, DIAGNOSTIC: ICD-10-PCS | Performed by: INTERNAL MEDICINE

## 2022-10-07 PROCEDURE — 85018 HEMOGLOBIN: CPT | Performed by: PHYSICIAN ASSISTANT

## 2022-10-07 PROCEDURE — 0DB98ZX EXCISION OF DUODENUM, VIA NATURAL OR ARTIFICIAL OPENING ENDOSCOPIC, DIAGNOSTIC: ICD-10-PCS | Performed by: INTERNAL MEDICINE

## 2022-10-07 PROCEDURE — 250N000009 HC RX 250: Performed by: INTERNAL MEDICINE

## 2022-10-07 PROCEDURE — 43239 EGD BIOPSY SINGLE/MULTIPLE: CPT | Performed by: INTERNAL MEDICINE

## 2022-10-07 PROCEDURE — 250N000013 HC RX MED GY IP 250 OP 250 PS 637: Performed by: INTERNAL MEDICINE

## 2022-10-07 PROCEDURE — 36415 COLL VENOUS BLD VENIPUNCTURE: CPT | Performed by: PHYSICIAN ASSISTANT

## 2022-10-07 PROCEDURE — 80048 BASIC METABOLIC PNL TOTAL CA: CPT | Performed by: INTERNAL MEDICINE

## 2022-10-07 PROCEDURE — 99233 SBSQ HOSP IP/OBS HIGH 50: CPT | Performed by: HOSPITALIST

## 2022-10-07 PROCEDURE — 0DB78ZX EXCISION OF STOMACH, PYLORUS, VIA NATURAL OR ARTIFICIAL OPENING ENDOSCOPIC, DIAGNOSTIC: ICD-10-PCS | Performed by: INTERNAL MEDICINE

## 2022-10-07 PROCEDURE — P9016 RBC LEUKOCYTES REDUCED: HCPCS | Performed by: INTERNAL MEDICINE

## 2022-10-07 PROCEDURE — 250N000011 HC RX IP 250 OP 636: Performed by: INTERNAL MEDICINE

## 2022-10-07 PROCEDURE — 99153 MOD SED SAME PHYS/QHP EA: CPT | Performed by: INTERNAL MEDICINE

## 2022-10-07 PROCEDURE — 0DBN8ZX EXCISION OF SIGMOID COLON, VIA NATURAL OR ARTIFICIAL OPENING ENDOSCOPIC, DIAGNOSTIC: ICD-10-PCS | Performed by: INTERNAL MEDICINE

## 2022-10-07 RX ORDER — LIDOCAINE HYDROCHLORIDE 20 MG/ML
INJECTION, SOLUTION INFILTRATION; PERINEURAL PRN
Status: COMPLETED | OUTPATIENT
Start: 2022-10-07 | End: 2022-10-07

## 2022-10-07 RX ORDER — ONDANSETRON 2 MG/ML
4 INJECTION INTRAMUSCULAR; INTRAVENOUS
Status: DISCONTINUED | OUTPATIENT
Start: 2022-10-07 | End: 2022-10-07

## 2022-10-07 RX ORDER — ATROPINE SULFATE 0.1 MG/ML
1 INJECTION INTRAVENOUS
Status: DISCONTINUED | OUTPATIENT
Start: 2022-10-07 | End: 2022-10-07 | Stop reason: HOSPADM

## 2022-10-07 RX ORDER — FLUMAZENIL 0.1 MG/ML
0.2 INJECTION, SOLUTION INTRAVENOUS
Status: DISCONTINUED | OUTPATIENT
Start: 2022-10-07 | End: 2022-10-07 | Stop reason: HOSPADM

## 2022-10-07 RX ORDER — NALOXONE HYDROCHLORIDE 0.4 MG/ML
0.4 INJECTION, SOLUTION INTRAMUSCULAR; INTRAVENOUS; SUBCUTANEOUS
Status: DISCONTINUED | OUTPATIENT
Start: 2022-10-07 | End: 2022-10-07 | Stop reason: HOSPADM

## 2022-10-07 RX ORDER — FLUMAZENIL 0.1 MG/ML
0.2 INJECTION, SOLUTION INTRAVENOUS
Status: ACTIVE | OUTPATIENT
Start: 2022-10-07 | End: 2022-10-08

## 2022-10-07 RX ORDER — FENTANYL CITRATE 50 UG/ML
50-100 INJECTION, SOLUTION INTRAMUSCULAR; INTRAVENOUS EVERY 5 MIN PRN
Status: DISCONTINUED | OUTPATIENT
Start: 2022-10-07 | End: 2022-10-07 | Stop reason: HOSPADM

## 2022-10-07 RX ORDER — PROCHLORPERAZINE MALEATE 5 MG
5 TABLET ORAL EVERY 6 HOURS PRN
Status: DISCONTINUED | OUTPATIENT
Start: 2022-10-07 | End: 2022-10-11 | Stop reason: HOSPADM

## 2022-10-07 RX ORDER — SODIUM CHLORIDE 9 MG/ML
INJECTION, SOLUTION INTRAVENOUS CONTINUOUS
Status: ACTIVE | OUTPATIENT
Start: 2022-10-07 | End: 2022-10-07

## 2022-10-07 RX ORDER — ONDANSETRON 4 MG/1
4 TABLET, ORALLY DISINTEGRATING ORAL EVERY 6 HOURS PRN
Status: DISCONTINUED | OUTPATIENT
Start: 2022-10-07 | End: 2022-10-11 | Stop reason: HOSPADM

## 2022-10-07 RX ORDER — FENTANYL CITRATE 50 UG/ML
INJECTION, SOLUTION INTRAMUSCULAR; INTRAVENOUS PRN
Status: COMPLETED | OUTPATIENT
Start: 2022-10-07 | End: 2022-10-07

## 2022-10-07 RX ORDER — LIDOCAINE 40 MG/G
CREAM TOPICAL
Status: DISCONTINUED | OUTPATIENT
Start: 2022-10-07 | End: 2022-10-07

## 2022-10-07 RX ORDER — ONDANSETRON 2 MG/ML
4 INJECTION INTRAMUSCULAR; INTRAVENOUS EVERY 6 HOURS PRN
Status: DISCONTINUED | OUTPATIENT
Start: 2022-10-07 | End: 2022-10-11 | Stop reason: HOSPADM

## 2022-10-07 RX ORDER — NALOXONE HYDROCHLORIDE 0.4 MG/ML
0.2 INJECTION, SOLUTION INTRAMUSCULAR; INTRAVENOUS; SUBCUTANEOUS
Status: DISCONTINUED | OUTPATIENT
Start: 2022-10-07 | End: 2022-10-07 | Stop reason: HOSPADM

## 2022-10-07 RX ORDER — EPINEPHRINE 1 MG/ML
0.1 INJECTION, SOLUTION, CONCENTRATE INTRAVENOUS
Status: DISCONTINUED | OUTPATIENT
Start: 2022-10-07 | End: 2022-10-07 | Stop reason: HOSPADM

## 2022-10-07 RX ORDER — DIPHENHYDRAMINE HYDROCHLORIDE 50 MG/ML
25-50 INJECTION INTRAMUSCULAR; INTRAVENOUS
Status: DISCONTINUED | OUTPATIENT
Start: 2022-10-07 | End: 2022-10-07 | Stop reason: HOSPADM

## 2022-10-07 RX ORDER — SIMETHICONE 40MG/0.6ML
133 SUSPENSION, DROPS(FINAL DOSAGE FORM)(ML) ORAL
Status: DISCONTINUED | OUTPATIENT
Start: 2022-10-07 | End: 2022-10-07 | Stop reason: HOSPADM

## 2022-10-07 RX ADMIN — TAMSULOSIN HYDROCHLORIDE 0.4 MG: 0.4 CAPSULE ORAL at 19:42

## 2022-10-07 RX ADMIN — FENTANYL CITRATE 100 MCG: 50 INJECTION, SOLUTION INTRAMUSCULAR; INTRAVENOUS at 12:08

## 2022-10-07 RX ADMIN — PANTOPRAZOLE SODIUM 40 MG: 40 INJECTION, POWDER, FOR SOLUTION INTRAVENOUS at 08:43

## 2022-10-07 RX ADMIN — FENTANYL CITRATE 25 MCG: 50 INJECTION, SOLUTION INTRAMUSCULAR; INTRAVENOUS at 12:24

## 2022-10-07 RX ADMIN — TOPICAL ANESTHETIC 1 SPRAY: 200 SPRAY DENTAL; PERIODONTAL at 12:07

## 2022-10-07 RX ADMIN — SODIUM CHLORIDE: 9 INJECTION, SOLUTION INTRAVENOUS at 09:25

## 2022-10-07 RX ADMIN — LIDOCAINE HYDROCHLORIDE 6 ML: 20 INJECTION, SOLUTION INFILTRATION; PERINEURAL at 12:17

## 2022-10-07 RX ADMIN — MIDAZOLAM 2 MG: 1 INJECTION INTRAMUSCULAR; INTRAVENOUS at 12:08

## 2022-10-07 RX ADMIN — FENTANYL CITRATE 25 MCG: 50 INJECTION, SOLUTION INTRAMUSCULAR; INTRAVENOUS at 12:25

## 2022-10-07 RX ADMIN — MIDAZOLAM 2 MG: 1 INJECTION INTRAMUSCULAR; INTRAVENOUS at 12:17

## 2022-10-07 RX ADMIN — PANTOPRAZOLE SODIUM 40 MG: 40 INJECTION, POWDER, FOR SOLUTION INTRAVENOUS at 19:42

## 2022-10-07 RX ADMIN — ACETAMINOPHEN 650 MG: 325 TABLET, FILM COATED ORAL at 14:26

## 2022-10-07 ASSESSMENT — ACTIVITIES OF DAILY LIVING (ADL)
ADLS_ACUITY_SCORE: 18

## 2022-10-07 NOTE — PROGRESS NOTES
"[facility]  Hospitalist Progress Note  Kacy Hopkins MD  10/07/2022    Assessment & Plan   Raúl Marie is a 67 year old male with history of BPH, IgA nephropathy, glaucoma who presents with light-headedness, fatigue, dyspnea and chest pain on exertion in setting of 4 years of intermittent bright red blood per rectum. Admitted on 10/5/2022.     Acute on chronic gastrointestinal bleed, suspect lower   Iron deficiency anemia, severe  Acute blood loss anemia, symptomatic  *Presents with light-headedness, fatigue, dyspnea and chest pain on exertion in setting of 4 years of intermittent bright red blood per rectum, worsening over the last 2-3 weeks. Reports hx of hemorrhoids.   *Has been managing with a homeopath with an oral tincture treatment (Evaristo)   *Hemoglobin 4.6 g/dl, ferritin 3, iron sat low, iron binding capacity high  *Denies NSAID use, denies melena  *Hemodynamically stable, severe iron deficiency suggests likely slow blood loss over rapid bleeding  - GI ([facility]) consulted -  EGD and colon on 10/7  - Hemoglobin stable at 8.5  - Blood transfusion as needed if hemoglobin <7     BPH  - continue tamsulosin      Dry cough  CXR clear   - Symptomatic management     Glaucoma  - Continue prior to admission eye drops     Diet:NPO  DVT Prophylaxis: Pneumatic Compression Devices  Loredo Catheter: Not present  Code Status:   Full code     Disposition Plan  -- anticipate on 10/8      Interval History   Patient is admitted GI bleed with anemia.  Patient is doing prep for colonoscopy.  Patient had large bloody bowel movements this morning. Has some light headedness.  Denies nausea, emesis, abdominal pain.  He has history of hemorrhoids.    -Data reviewed today: I reviewed all new labs and imaging over the last 24 hours.     Physical Exam    , Blood pressure 112/69, pulse 50, temperature 97.8  F (36.6  C), resp. rate (!) 7, height 1.753 m (5' 9\"), weight 69.4 kg (153 lb), SpO2 99 %.  Vitals: "    10/07/22 1131   Weight: 69.4 kg (153 lb)     Vital Signs with Ranges  Temp:  [97.1  F (36.2  C)-98.2  F (36.8  C)] 97.8  F (36.6  C)  Pulse:  [48-85] 50  Resp:  [0-38] 7  BP: ()/() 112/69  SpO2:  [94 %-100 %] 99 %  I/O's Last 24 hours  I/O last 3 completed shifts:  In: 2330 [P.O.:2030]  Out: -   Physical Exam  HENT:      Head: Normocephalic and atraumatic.      Nose: Nose normal.      Mouth/Throat:      Mouth: Mucous membranes are moist.   Eyes:      Extraocular Movements: Extraocular movements intact.      Pupils: Pupils are equal, round, and reactive to light.   Cardiovascular:      Rate and Rhythm: Normal rate and regular rhythm.   Pulmonary:      Effort: Pulmonary effort is normal.      Breath sounds: Normal breath sounds.   Abdominal:      General: Abdomen is flat. Bowel sounds are normal. There is no distension.      Palpations: Abdomen is soft. There is no mass.      Tenderness: There is no abdominal tenderness. There is no guarding.   Musculoskeletal:      Cervical back: Normal range of motion and neck supple.   Skin:     Coloration: Skin is pale.   Neurological:      Mental Status: He is alert.            Medications   All medications were reviewed.      latanoprost  1 drop Both Eyes At Bedtime     pantoprazole  40 mg Intravenous BID     sodium chloride (PF)  3 mL Intracatheter Q8H     tamsulosin  0.4 mg Oral At Bedtime        Data   Recent Labs   Lab 10/07/22  0946 10/07/22  0545 10/06/22  2347 10/06/22  1902 10/06/22  1317 10/06/22  0516 10/05/22  1802   WBC  --  5.4  --   --   --  6.1 5.1   HGB 8.5* 7.5*  7.5* 6.8*   < > 7.4* 7.1* 4.6*   MCV  --  75*  --   --   --  74* 69*   PLT  --  264  --   --   --  294 358   INR  --   --   --   --   --   --  1.10   NA  --  143  --   --   --   --  138   POTASSIUM  --  4.0  --   --  4.0  --  3.6   CHLORIDE  --  112*  --   --   --   --  105   CO2  --  26  --   --   --   --  28   BUN  --  14  --   --   --   --  24   CR  --  1.18  --   --   --   --  1.19    ANIONGAP  --  5  --   --   --   --  5   SONA  --  8.5  --   --   --   --  8.5   GLC  --  98  --   --   --   --  101*    < > = values in this interval not displayed.       No results found for this or any previous visit (from the past 24 hour(s)).

## 2022-10-07 NOTE — PLAN OF CARE
Goal Outcome Evaluation:  A&O x 4, VSS on RA, pain controlled with tylenol, liquid stool bright red x 2 today, EGD and Colonoscope done today, up Independently voiding adequately in BA, IV SL,awaiting colo//rectal consult, continue to monitor.

## 2022-10-07 NOTE — PROGRESS NOTES
A/O x4.VSS on RA. Up independently in room. Voiding adequately. PIV x2 sl. Hgb 6.8, transfused 1 unit RBC. Transfusion completed  Without transfusion reaction. Denied pain. Had BM  X1. NPO post midnight instructed. Scheduled for Colonoscopy and EGD today at 11:45am.  Will continue to monitor.

## 2022-10-07 NOTE — PROVIDER NOTIFICATION
MD Notification    Notified Person: MD    Notified Person Name: Alison Trevizo    Notification Date/Time: 10/7/22 0460    Notification Interaction: Called left message    Purpose of Notification: for updated diet order and follow up questions post proccedure    Orders Received: awaiting call back    Comments:

## 2022-10-07 NOTE — PROGRESS NOTES
[facility]  Gastroenterology Progress Note     Raúl Marie MRN# [id number]   YOB: 1955 Age: 67 year old          Assessment and Plan:     Raúl Marie is a 67 year old male with a PMHx of BPH, IgA nephropathy, glaucoma whom presented with severe anemia with hemoglobin of 4.6 and bright red rectal bleeding.     Rectal bleeding  Anemia, unspecified type  Rectal pain  Has had intermittent rectal bleeding for years and now has rectal pain and symptomatic anemia.  Hemoglobin 4.6 and has improved to 7.1 after 3 units of PRBC.  Received an additional unit of PRBC last night after hemoglobin dropped to 6.8.  Last colonoscopy 10 years ago and unremarkable per patient  Symptoms suggestive of hemorrhoidal bleed, anal fissure, diverticular bleed vs anal or rectal neoplasm.     -- Plan EGD and colonoscopy today  -- Serial hemoglobin and transfuse with hemoglobin of 7 or less  -- IV pantoprazole 40 mg BID  --  NPO                 Interval History:     no new complaints, doing well, and has had stools turn translucent with ongoing bright red blood in rectum              Review of Systems:     C: NEGATIVE for fever, chills, change in weight  E/M: NEGATIVE for ear, mouth and throat problems  R: NEGATIVE for significant cough or SOB  CV: NEGATIVE for chest pain, palpitations or peripheral edema             Medications:   I have reviewed this patient's current medications   latanoprost  1 drop Both Eyes At Bedtime    pantoprazole  40 mg Intravenous BID    sodium chloride (PF)  3 mL Intracatheter Q8H    tamsulosin  0.4 mg Oral At Bedtime                  Physical Exam:   Vitals were reviewed  Vital Signs with Ranges  Temp:  [97.1  F (36.2  C)-98.2  F (36.8  C)] 97.8  F (36.6  C)  Pulse:  [55-73] 55  Resp:  [16-18] 16  BP: ()/(48-82) 138/75  SpO2:  [95 %-100 %] 100 %  I/O last 3 completed shifts:  In: 2330 [P.O.:2030]  Out: -   Constitutional: healthy, alert, and no distress    Cardiovascular: negative, PMI normal. No lifts, heaves, or thrills. RRR. No murmurs, clicks gallops or rub  Respiratory: negative, Percussion normal. Good diaphragmatic excursion. Lungs clear  Abdomen: Abdomen soft, non-tender. BS normal. No masses, organomegaly           Data:   I reviewed the patient's new clinical lab test results.   Recent Labs   Lab Test 10/07/22  0545 10/06/22  2347 10/06/22  1902 10/06/22  1317 10/06/22  0516 10/05/22  1802   WBC 5.4  --   --   --  6.1 5.1   HGB 7.5*  7.5* 6.8* 7.4*   < > 7.1* 4.6*   MCV 75*  --   --   --  74* 69*     --   --   --  294 358   INR  --   --   --   --   --  1.10    < > = values in this interval not displayed.     Recent Labs   Lab Test 10/07/22  0545 10/06/22  1317 10/05/22  1802   POTASSIUM 4.0 4.0 3.6   CHLORIDE 112*  --  105   CO2 26  --  28   BUN 14  --  24   ANIONGAP 5  --  5     No lab results found.    Invalid input(s): ALKPHOSPH    I reviewed the patient's new imaging results.    All laboratory data reviewed  All imaging studies reviewed by me.    Alison Trevizo PA-C,  10/7/2022  [facility]  Office : [phone]  Cell: [phone] (Dr. Sommer)  Cell: [phone] (Alison Trevizo PA-C)

## 2022-10-07 NOTE — UTILIZATION REVIEW
Admission Status; Secondary Review Determination         Under the authority of the Utilization Management Committee, the utilization review process indicated a secondary review on the above patient.  The review outcome is based on review of the medical records, discussions with staff, and applying clinical experience noted on the date of the review.        (x)      Inpatient Status Appropriate - This patient's medical care is consistent with medical management for inpatient care and reasonable inpatient medical practice.          RATIONALE FOR DETERMINATION   The patient has a 67-year-old male admitted on 10/5/2022.  Patient came in with severe anemia with a hemoglobin of 4.6 and bright red rectal bleeding.  Patient was transfused 3 units of packed red cells and was able to get hemoglobin up to 7.1.  Patient has had a drop below 7 again and has required additional unit of packed red cells as of early this morning.  GI has been consulted.  Tentative diagnosis currently is hemorrhoidal bleed versus anal fissure versus diverticular bleed versus anal rectal neoplasm.  Current plan is EGD and colonoscopy today.  Continue close observation of active bleeding.  Utilization review was done by Dr. Floyd yesterday recommending inpatient status pain appropriate.  The patient is currently listed as inpatient class which is appropriate based on acuity and treatment needs.      The severity of illness, intensity of service provided, expected LOS and risk for adverse outcome make the care complex, high risk and appropriate for hospital admission.        The information on this document is developed by the utilization review team in order for the business office to ensure compliance.  This only denotes the appropriateness of proper admission status and does not reflect the quality of care rendered.         The definitions of Inpatient Status and Observation Status used in making the determination above are those provided in  the CMS Coverage Manual, Chapter 1 and Chapter 6, section 70.4.      Sincerely,     Benji Mercado MD  Physician Advisor  Utilization Review/ Case Management  [facility].

## 2022-10-07 NOTE — CONSULTS
[facility]  Colon and Rectal Surgery Consult Note  Name: Raúl Marie    MRN: [id number]  YOB: 1955    Age: 67 year old  Date of admission: 10/5/2022  Primary care provider: Steve Garcia     Requesting Physician:  Alison Trevizo PA-C  Reason for consult:  Bleeding hemorrhoids           History of Present Illness:   Raúl Marie is a 67 year old male with a history of IgA nephropathy, BPH, and glaucoma, who presents with rectal bleeding and severe, symptomatic anemia.  He has had intermittent bright red rectal bleeding with bowel movements for the last 6 to 7 years.  He had a period without any bleeding for about 6 months, and then it started happening again about 3 weeks ago.  It was more severe than it had been in the past and he describes large amounts of bright red blood as well as some clots with every bowel movement.  He developed fatigue, dizziness, shortness of breath, and chest pressure especially with activity, so he presented to urgent care on 10/5.  Labs done there revealed a Hgb of 4.6 so he was sent to the ED here to be admitted for blood transfusions.  He got 3 units PRBC and Hgb improved to 7.1.  It did drop again to 6.8 so he got an additional unit, and now Hgb is stable at 8.5 today. MCV and iron studies suggest chronic blood loss.    GI was consulted and recommended proceeding with an EGD and colonoscopy to evaluate for source of bleeding.  He completed a bowel prep last night and did have more heavy rectal bleeding this morning.  EGD was overall unremarkable aside from some erythema in the gastric antrum which was biopsied.  Colonoscopy was also overall unremarkable aside from some mild erythema in the sigmoid and descending colon which were also biopsied.  The only other abnormal finding was severe hemorrhoids, which are presumed to be the source of the bleeding.  We were consulted for further evaluation and management of the hemorrhoids.  Raúl reports  "that he takes a daily fiber and magnesium supplement and generally has a large, soft bowel movement once a day.  He does have pain with every bowel movement.  He also endorses frequent stool leakage. He was concerned with how symptomatic he had become in the last few weeks.      Colonoscopy History:  Done earlier today, see report. Last colonoscopy prior was 10 years ago, normal per patient              Past Medical History:     Past Medical History:   Diagnosis Date     BPH (benign prostatic hyperplasia)      Glaucoma      Hemorrhoids, unspecified hemorrhoid type      IgA nephropathy      Sleep apnea              Past Surgical History:     Past Surgical History:   Procedure Laterality Date     APPENDECTOMY                 Social History:     Social History     Tobacco Use     Smoking status: Never Smoker     Smokeless tobacco: Never Used   Substance Use Topics     Alcohol use: Yes             Family History:     Family History   Problem Relation Age of Onset     Diabetes Father      Colon Cancer Paternal Grandmother              Allergies:     Allergies   Allergen Reactions     Chlorpheniramine-Phenylephrine Itching and Other (See Comments)     Other reaction(s): Other (See Comments)     Walnuts [Nuts]      Wheat Extract              Medications:       latanoprost  1 drop Both Eyes At Bedtime     pantoprazole  40 mg Intravenous BID     sodium chloride (PF)  3 mL Intracatheter Q8H     tamsulosin  0.4 mg Oral At Bedtime             Review of Systems:   A comprehensive greater than 10 system review of systems was carried out.  Pertinent positives and negatives are noted above.  Otherwise negative for contributory info.            Physical Exam:     Blood pressure 102/64, pulse 54, temperature 97.8  F (36.6  C), resp. rate 13, height 1.753 m (5' 9\"), weight 69.4 kg (153 lb), SpO2 97 %.    Intake/Output Summary (Last 24 hours) at 10/7/2022 8196  Last data filed at 10/7/2022 0515  Gross per 24 hour   Intake 750 ml "   Output --   Net 750 ml     Exam:  General - Awake alert and oriented, appears stated age  Pulm - Non-labored breathing with normal respiratory effort  CVS - reg rate and rhythm, no peripheral edema  Rectal - chaperone present. Patulous anus. Large, non-thrombosed external hemorrhoids circumferentially. No active bleeding. No fissures. CARINE and anoscopy deferred.  Neuro - CN II-XII grossly intact  Musculoskeletal - extremities with no clubbing, cyanosis or edema  Psych - responsive, alert, cooperative; oriented x3; appropriate mood and affect  External/skin - inspection reveals no rashes, lesions or ulcers, normal coloring             Data Reviewed:     Recent Results (from the past 48 hour(s))   Chest XR,  PA & LAT    Narrative    EXAM: XR CHEST 2 VIEWS  LOCATION: [facility]  DATE/TIME: 10/5/2022 7:31 PM    INDICATION: cough  COMPARISON: 1/11/2011      Impression    IMPRESSION: Heart size and pulmonary vascularity normal. The lungs are clear. Mild thoracolumbar spinal curve.       Recent Labs   Lab 10/07/22  0946 10/07/22  0545 10/06/22  2347 10/06/22  1317 10/06/22  0516 10/05/22  1802   WBC  --  5.4  --   --  6.1 5.1   HGB 8.5* 7.5*  7.5* 6.8*   < > 7.1* 4.6*   HCT  --  24.3*  --   --  23.0* 16.6*   MCV  --  75*  --   --  74* 69*   PLT  --  264  --   --  294 358    < > = values in this interval not displayed.     Recent Labs   Lab 10/07/22  0545 10/06/22  1317 10/05/22  1802     --  138   POTASSIUM 4.0 4.0 3.6   CHLORIDE 112*  --  105   CO2 26  --  28   ANIONGAP 5  --  5   GLC 98  --  101*   BUN 14  --  24   CR 1.18  --  1.19   GFRESTIMATED 68  --  67   SONA 8.5  --  8.5         Assessment and Plan:   Raúl Marie is a 67 year old male who has had intermittent bright red rectal bleeding with bowel movements for 6-7 years. The bleeding has been much more significant in the last few weeks and he developed fatigue, dizziness, shortness of breath and chest pressure with activity.  Labs at urgent care showed severe anemia with Hgb 4.6 so he was sent to [facility] to be admitted for transfusions. He has received a total of 4 U PRBC and Hgb is now stable at 8.5. EGD and colonoscopy overall unremarkable aside from a few areas of erythematous mucosa (biopsied), and large hemorrhoids which are presumed to be the cause of the rectal bleeding.    No indication for urgent surgical intervention at this time. Would recommend continuing to monitor Hgb over the weekend and transfuse as needed. If it remains stable over the weekend, he can likely discharge and then we will make arrangements for an outpatient hemorrhoidectomy later next week. If he has ongoing heavy bleeding and transfusion requirements it may need to be done sooner over the weekend or on Monday evening. In the meantime he should continue a bowel regimen to keep stools soft and limit straining with bowel movements. CRS will continue to follow.    Seen and examined with Dr. García.    Patient specific identified risk factors considered as part of today s evaluation include: Severe anemia, nephropathy    Additional history obtained from patient, spouse, chart review.  Time spent on consultation: 35 minutes, greater than 50% spent on counseling and/or coordination of care.      Ron Rice PA-C  Colorectal Physician Assistant    [facility]  [address] [facility] [address]  [place] [address]  T: [phone]  F: [phone]

## 2022-10-07 NOTE — PLAN OF CARE
Goal Outcome Evaluation:  Tolerated bowel prep. Stool from brown to yellow. No blood noted. Encouraged to not strain or sit on toilet too  long due to hemorrhoid. Hgb stable at 7.4. independent in room. Refuses top siderails. NPO after MN. Cream applied to anus x1 with relief. Will continue to monitor.

## 2022-10-07 NOTE — PROVIDER NOTIFICATION
MD Notification    Notified Person: MD    Notified Person Name:Sandeep    Notification Date/Time: 10/7/22 0828    Notification Interaction: paged     Purpose of Notification: To update on large amounts of bright red blood with liquid movement that is a change from last 2 days.     Orders Received: please see orders.    Comments:

## 2022-10-08 LAB
ALBUMIN SERPL-MCNC: 2.5 G/DL (ref 3.4–5)
ALP SERPL-CCNC: 43 U/L (ref 40–150)
ALT SERPL W P-5'-P-CCNC: 27 U/L (ref 0–70)
ANION GAP SERPL CALCULATED.3IONS-SCNC: 5 MMOL/L (ref 3–14)
AST SERPL W P-5'-P-CCNC: 18 U/L (ref 0–45)
BASOPHILS # BLD AUTO: 0 10E3/UL (ref 0–0.2)
BASOPHILS NFR BLD AUTO: 0 %
BILIRUB SERPL-MCNC: 0.4 MG/DL (ref 0.2–1.3)
BUN SERPL-MCNC: 19 MG/DL (ref 7–30)
CALCIUM SERPL-MCNC: 8.5 MG/DL (ref 8.5–10.1)
CHLORIDE BLD-SCNC: 110 MMOL/L (ref 94–109)
CO2 SERPL-SCNC: 27 MMOL/L (ref 20–32)
CREAT SERPL-MCNC: 1.23 MG/DL (ref 0.66–1.25)
EOSINOPHIL # BLD AUTO: 0.2 10E3/UL (ref 0–0.7)
EOSINOPHIL NFR BLD AUTO: 2 %
ERYTHROCYTE [DISTWIDTH] IN BLOOD BY AUTOMATED COUNT: 21.5 % (ref 10–15)
GFR SERPL CREATININE-BSD FRML MDRD: 64 ML/MIN/1.73M2
GLUCOSE BLD-MCNC: 113 MG/DL (ref 70–99)
HCT VFR BLD AUTO: 23.7 % (ref 40–53)
HGB BLD-MCNC: 7.4 G/DL (ref 13.3–17.7)
HGB BLD-MCNC: 8.1 G/DL (ref 13.3–17.7)
HGB BLD-MCNC: 8.3 G/DL (ref 13.3–17.7)
IMM GRANULOCYTES # BLD: 0 10E3/UL
IMM GRANULOCYTES NFR BLD: 0 %
LYMPHOCYTES # BLD AUTO: 1.2 10E3/UL (ref 0.8–5.3)
LYMPHOCYTES NFR BLD AUTO: 13 %
MCH RBC QN AUTO: 23.3 PG (ref 26.5–33)
MCHC RBC AUTO-ENTMCNC: 31.2 G/DL (ref 31.5–36.5)
MCV RBC AUTO: 75 FL (ref 78–100)
MONOCYTES # BLD AUTO: 0.6 10E3/UL (ref 0–1.3)
MONOCYTES NFR BLD AUTO: 7 %
NEUTROPHILS # BLD AUTO: 7.2 10E3/UL (ref 1.6–8.3)
NEUTROPHILS NFR BLD AUTO: 78 %
NRBC # BLD AUTO: 0 10E3/UL
NRBC BLD AUTO-RTO: 0 /100
PLATELET # BLD AUTO: 288 10E3/UL (ref 150–450)
POTASSIUM BLD-SCNC: 4 MMOL/L (ref 3.4–5.3)
PROT SERPL-MCNC: 5.2 G/DL (ref 6.8–8.8)
RBC # BLD AUTO: 3.18 10E6/UL (ref 4.4–5.9)
SODIUM SERPL-SCNC: 142 MMOL/L (ref 133–144)
WBC # BLD AUTO: 9.2 10E3/UL (ref 4–11)

## 2022-10-08 PROCEDURE — 36415 COLL VENOUS BLD VENIPUNCTURE: CPT | Performed by: PHYSICIAN ASSISTANT

## 2022-10-08 PROCEDURE — 99232 SBSQ HOSP IP/OBS MODERATE 35: CPT | Performed by: STUDENT IN AN ORGANIZED HEALTH CARE EDUCATION/TRAINING PROGRAM

## 2022-10-08 PROCEDURE — 250N000013 HC RX MED GY IP 250 OP 250 PS 637: Performed by: STUDENT IN AN ORGANIZED HEALTH CARE EDUCATION/TRAINING PROGRAM

## 2022-10-08 PROCEDURE — 250N000013 HC RX MED GY IP 250 OP 250 PS 637: Performed by: INTERNAL MEDICINE

## 2022-10-08 PROCEDURE — 120N000001 HC R&B MED SURG/OB

## 2022-10-08 PROCEDURE — 80053 COMPREHEN METABOLIC PANEL: CPT | Performed by: HOSPITALIST

## 2022-10-08 PROCEDURE — 36415 COLL VENOUS BLD VENIPUNCTURE: CPT | Performed by: HOSPITALIST

## 2022-10-08 PROCEDURE — 85018 HEMOGLOBIN: CPT | Performed by: PHYSICIAN ASSISTANT

## 2022-10-08 PROCEDURE — 85014 HEMATOCRIT: CPT | Performed by: HOSPITALIST

## 2022-10-08 PROCEDURE — C9113 INJ PANTOPRAZOLE SODIUM, VIA: HCPCS | Performed by: INTERNAL MEDICINE

## 2022-10-08 PROCEDURE — 250N000009 HC RX 250

## 2022-10-08 PROCEDURE — 82040 ASSAY OF SERUM ALBUMIN: CPT | Performed by: HOSPITALIST

## 2022-10-08 PROCEDURE — 250N000011 HC RX IP 250 OP 636: Performed by: INTERNAL MEDICINE

## 2022-10-08 RX ORDER — WATER 10 ML/10ML
INJECTION INTRAMUSCULAR; INTRAVENOUS; SUBCUTANEOUS
Status: COMPLETED
Start: 2022-10-08 | End: 2022-10-08

## 2022-10-08 RX ADMIN — PANTOPRAZOLE SODIUM 40 MG: 40 INJECTION, POWDER, FOR SOLUTION INTRAVENOUS at 22:07

## 2022-10-08 RX ADMIN — PANTOPRAZOLE SODIUM 40 MG: 40 INJECTION, POWDER, FOR SOLUTION INTRAVENOUS at 08:55

## 2022-10-08 RX ADMIN — WATER 10 ML: 1 INJECTION INTRAMUSCULAR; INTRAVENOUS; SUBCUTANEOUS at 22:15

## 2022-10-08 RX ADMIN — LATANOPROST 1 DROP: 50 SOLUTION/ DROPS OPHTHALMIC at 21:43

## 2022-10-08 RX ADMIN — TAMSULOSIN HYDROCHLORIDE 0.4 MG: 0.4 CAPSULE ORAL at 21:40

## 2022-10-08 RX ADMIN — PSYLLIUM HUSK 1 PACKET: 3.4 POWDER ORAL at 13:29

## 2022-10-08 ASSESSMENT — ACTIVITIES OF DAILY LIVING (ADL)
ADLS_ACUITY_SCORE: 18

## 2022-10-08 NOTE — PLAN OF CARE
Goal Outcome Evaluation:      Date/Time 10/807-1930    Trauma/Ortho/Medical (Choose one) medical    Diagnosis: SOB, fatigue, Hemorids  Mental Status:4  Activity/dangle up ind  Diet: re  Pain:n/a  Loredo/Voiding:BR  Tele/Restraints/Iso:n/a  02/LDA: KOKI  D/C Date: tomorrow?  Other Info: HGB q.6hrs.

## 2022-10-08 NOTE — PLAN OF CARE
Pt A/Ox4, VSS on RA, denies pain, CMS intact, independent in room, HGB recheck q6, last 7.3 will continue to monitor

## 2022-10-08 NOTE — PROGRESS NOTES
Colon and Rectal Surgery  Daily Progress Note    10/08/22     Subjective  Bleeding reduced, not overnight. Energy and dizziness resolved. Questions regarding bowel regimen and overall bowel health. Questions regarding what surgery would look like and risks.     Objective  Intake/Output last 24 hrs:  No intake or output data in the 24 hours ending 10/08/22 0740  Temp:  [97  F (36.1  C)-98.4  F (36.9  C)] 98.4  F (36.9  C)  Pulse:  [47-85] 60  Resp:  [0-38] 17  BP: ()/() 111/62  SpO2:  [94 %-100 %] 95 %    Physical Exam:  General: Comfortable conversant working on computer and sitting up in chair  Respiratory: non-labored breathing  Abdomen: soft, appropriately tender, non-distended  GREENE  RRR  Perianal exam with external/internal hemorrhoids without blood    Pertinent Labs  Lab Results: personally reviewed.  Lab Results   Component Value Date     10/08/2022     10/07/2022     10/05/2022     01/11/2011    CO2 27 10/08/2022    CO2 26 10/07/2022    CO2 28 10/05/2022    CO2 25 01/11/2011    BUN 19 10/08/2022    BUN 14 10/07/2022    BUN 24 10/05/2022    BUN 24 01/11/2011     Lab Results   Component Value Date    WBC 9.2 10/08/2022    WBC 5.4 10/07/2022    WBC 6.1 10/06/2022    WBC 8.4 01/11/2011    HGB 7.4 10/08/2022    HGB 7.3 10/07/2022    HGB 8.1 10/07/2022    HGB 15.1 01/11/2011    HCT 23.7 10/08/2022    HCT 24.3 10/07/2022    HCT 23.0 10/06/2022    HCT 44.6 01/11/2011    MCV 75 10/08/2022    MCV 75 10/07/2022    MCV 74 10/06/2022    MCV 88 01/11/2011     10/08/2022     10/07/2022     10/06/2022     01/11/2011       Assessment/Plan: This is a 67 year old male admitted with acute blood loss anemia in the setting of hemorrhoidal disease. Hgb stable x 2. Still had bleeding yesterday, not today. Spent time counseling him about procedure and also bowel habits.    Ok for psyllium  Repeat hgb today  Likely home tomorrow if bleeding remains controlled  Will  follow-up for outpatient surgery this week    Will discuss with Dr. Moe.    For questions/paging, please contact the CRS office at [phone].    Jayla Moctezuma MD  Colon and Rectal Surgery Fellow     [facility]  [address]  [place] [address]  T: [phone]  F: [phone]

## 2022-10-08 NOTE — PROGRESS NOTES
"[facility]  Hospitalist Progress Note    Aldair Bartlett MD  10/08/2022    Assessment & Plan   Raúl Marie is a 67 year old male with history of BPH, IgA nephropathy, glaucoma who presents with light-headedness, fatigue, dyspnea and chest pain on exertion in setting of 4 years of intermittent bright red blood per rectum. Admitted on 10/5/2022.     Acute on chronic gastrointestinal bleed, suspect lower   Iron deficiency anemia, severe  Acute blood loss anemia, symptomatic  *Presents with light-headedness, fatigue, dyspnea and chest pain on exertion in setting of 4 years of intermittent bright red blood per rectum, worsening over the last 2-3 weeks. Reports hx of hemorrhoids.   *Has been managing with a homeopath with an oral tincture treatment (Evaristo)   *Hemoglobin 4.6 g/dl, ferritin 3, iron sat low, iron binding capacity high  *Denies NSAID use, denies melena  *Hemodynamically stable, severe iron deficiency suggests likely slow blood loss over rapid bleeding  - GI (Kemal) consulted -  EGD and colon on 10/7  - Hemoglobin stable   - Blood transfusion as needed if hemoglobin <7  - CRS consulted -> Ok for psyllium and likely home tomorrow if bleeding remains controlled     BPH  - continue tamsulosin      Dry cough  CXR clear   - Symptomatic management     Glaucoma  - Continue prior to admission eye drops     Diet:NPO  DVT Prophylaxis: Pneumatic Compression Devices  Loredo Catheter: Not present  Code Status:   Full code     Disposition Plan  -- anticipate on 10/09 pending symptoms and stable Hgb    Interval History      Denies nausea, emesis, abdominal pain.    No CP/SOB  No new complaints    -Data reviewed today: I reviewed all new labs and imaging over the last 24 hours.     Physical Exam    , Blood pressure 111/62, pulse 60, temperature 98.4  F (36.9  C), temperature source Oral, resp. rate 17, height 1.753 m (5' 9\"), weight 69.4 kg (153 lb), SpO2 95 %.  Vitals:    10/07/22 1131   Weight: " 69.4 kg (153 lb)     Vital Signs with Ranges  Temp:  [97  F (36.1  C)-98.4  F (36.9  C)] 98.4  F (36.9  C)  Pulse:  [47-85] 60  Resp:  [0-38] 17  BP: ()/() 111/62  SpO2:  [94 %-100 %] 95 %  I/O's Last 24 hours  No intake/output data recorded.     Constitutional: awake, alert, cooperative, no apparent distress.   Respiratory: CTABL   Cardiovascular: RRR with no m/r/g   GI: Normal bowel sounds, soft, non-distended, non-tender. S  Neurologic: Awake, alert, oriented to name, place and time. Motor is 5 out of 5 bilaterally. Sensory is intact.   Neuropsychiatric: normal mood and affect      Medications   All medications were reviewed.      latanoprost  1 drop Both Eyes At Bedtime     pantoprazole  40 mg Intravenous BID     psyllium  1 packet Oral Daily     sodium chloride (PF)  3 mL Intracatheter Q8H     tamsulosin  0.4 mg Oral At Bedtime        Data   Recent Labs   Lab 10/08/22  0613 10/07/22  2335 10/07/22  1824 10/07/22  0946 10/07/22  0545 10/06/22  1902 10/06/22  1317 10/06/22  0516 10/05/22  1802   WBC 9.2  --   --   --  5.4  --   --  6.1 5.1   HGB 7.4* 7.3* 8.1*   < > 7.5*  7.5*   < > 7.4* 7.1* 4.6*   MCV 75*  --   --   --  75*  --   --  74* 69*     --   --   --  264  --   --  294 358   INR  --   --   --   --   --   --   --   --  1.10     --   --   --  143  --   --   --  138   POTASSIUM 4.0  --   --   --  4.0  --  4.0  --  3.6   CHLORIDE 110*  --   --   --  112*  --   --   --  105   CO2 27  --   --   --  26  --   --   --  28   BUN 19  --   --   --  14  --   --   --  24   CR 1.23  --   --   --  1.18  --   --   --  1.19   ANIONGAP 5  --   --   --  5  --   --   --  5   SONA 8.5  --   --   --  8.5  --   --   --  8.5   *  --   --   --  98  --   --   --  101*   ALBUMIN 2.5*  --   --   --   --   --   --   --   --    PROTTOTAL 5.2*  --   --   --   --   --   --   --   --    BILITOTAL 0.4  --   --   --   --   --   --   --   --    ALKPHOS 43  --   --   --   --   --   --   --   --    ALT 27  --    --   --   --   --   --   --   --    AST 18  --   --   --   --   --   --   --   --     < > = values in this interval not displayed.       No results found for this or any previous visit (from the past 24 hour(s)).

## 2022-10-09 LAB
ALBUMIN SERPL-MCNC: 2.8 G/DL (ref 3.4–5)
ALP SERPL-CCNC: 49 U/L (ref 40–150)
ALT SERPL W P-5'-P-CCNC: 28 U/L (ref 0–70)
ANION GAP SERPL CALCULATED.3IONS-SCNC: 4 MMOL/L (ref 3–14)
AST SERPL W P-5'-P-CCNC: 19 U/L (ref 0–45)
BASOPHILS # BLD AUTO: 0.1 10E3/UL (ref 0–0.2)
BASOPHILS NFR BLD AUTO: 1 %
BILIRUB SERPL-MCNC: 1.7 MG/DL (ref 0.2–1.3)
BUN SERPL-MCNC: 22 MG/DL (ref 7–30)
CALCIUM SERPL-MCNC: 8.8 MG/DL (ref 8.5–10.1)
CHLORIDE BLD-SCNC: 106 MMOL/L (ref 94–109)
CO2 SERPL-SCNC: 26 MMOL/L (ref 20–32)
CREAT SERPL-MCNC: 1.13 MG/DL (ref 0.66–1.25)
EOSINOPHIL # BLD AUTO: 0.2 10E3/UL (ref 0–0.7)
EOSINOPHIL NFR BLD AUTO: 4 %
ERYTHROCYTE [DISTWIDTH] IN BLOOD BY AUTOMATED COUNT: 22.5 % (ref 10–15)
GFR SERPL CREATININE-BSD FRML MDRD: 71 ML/MIN/1.73M2
GLUCOSE BLD-MCNC: 101 MG/DL (ref 70–99)
HCT VFR BLD AUTO: 27.8 % (ref 40–53)
HGB BLD-MCNC: 7.4 G/DL (ref 13.3–17.7)
HGB BLD-MCNC: 8 G/DL (ref 13.3–17.7)
HGB BLD-MCNC: 8.6 G/DL (ref 13.3–17.7)
IMM GRANULOCYTES # BLD: 0 10E3/UL
IMM GRANULOCYTES NFR BLD: 0 %
LYMPHOCYTES # BLD AUTO: 1.2 10E3/UL (ref 0.8–5.3)
LYMPHOCYTES NFR BLD AUTO: 19 %
MCH RBC QN AUTO: 22.8 PG (ref 26.5–33)
MCHC RBC AUTO-ENTMCNC: 30.9 G/DL (ref 31.5–36.5)
MCV RBC AUTO: 74 FL (ref 78–100)
MONOCYTES # BLD AUTO: 0.5 10E3/UL (ref 0–1.3)
MONOCYTES NFR BLD AUTO: 9 %
NEUTROPHILS # BLD AUTO: 4.2 10E3/UL (ref 1.6–8.3)
NEUTROPHILS NFR BLD AUTO: 67 %
NRBC # BLD AUTO: 0 10E3/UL
NRBC BLD AUTO-RTO: 0 /100
PLATELET # BLD AUTO: 325 10E3/UL (ref 150–450)
POTASSIUM BLD-SCNC: 4.1 MMOL/L (ref 3.4–5.3)
PROT SERPL-MCNC: 6.4 G/DL (ref 6.8–8.8)
RBC # BLD AUTO: 3.78 10E6/UL (ref 4.4–5.9)
SODIUM SERPL-SCNC: 136 MMOL/L (ref 133–144)
WBC # BLD AUTO: 6.3 10E3/UL (ref 4–11)

## 2022-10-09 PROCEDURE — 250N000013 HC RX MED GY IP 250 OP 250 PS 637: Performed by: STUDENT IN AN ORGANIZED HEALTH CARE EDUCATION/TRAINING PROGRAM

## 2022-10-09 PROCEDURE — 36415 COLL VENOUS BLD VENIPUNCTURE: CPT | Performed by: PHYSICIAN ASSISTANT

## 2022-10-09 PROCEDURE — 85018 HEMOGLOBIN: CPT | Performed by: STUDENT IN AN ORGANIZED HEALTH CARE EDUCATION/TRAINING PROGRAM

## 2022-10-09 PROCEDURE — 36415 COLL VENOUS BLD VENIPUNCTURE: CPT | Performed by: STUDENT IN AN ORGANIZED HEALTH CARE EDUCATION/TRAINING PROGRAM

## 2022-10-09 PROCEDURE — 80053 COMPREHEN METABOLIC PANEL: CPT | Performed by: HOSPITALIST

## 2022-10-09 PROCEDURE — 85018 HEMOGLOBIN: CPT | Performed by: HOSPITALIST

## 2022-10-09 PROCEDURE — 250N000013 HC RX MED GY IP 250 OP 250 PS 637: Performed by: INTERNAL MEDICINE

## 2022-10-09 PROCEDURE — 120N000001 HC R&B MED SURG/OB

## 2022-10-09 PROCEDURE — 250N000011 HC RX IP 250 OP 636: Performed by: INTERNAL MEDICINE

## 2022-10-09 PROCEDURE — 250N000009 HC RX 250

## 2022-10-09 PROCEDURE — 99232 SBSQ HOSP IP/OBS MODERATE 35: CPT | Performed by: STUDENT IN AN ORGANIZED HEALTH CARE EDUCATION/TRAINING PROGRAM

## 2022-10-09 PROCEDURE — C9113 INJ PANTOPRAZOLE SODIUM, VIA: HCPCS | Performed by: INTERNAL MEDICINE

## 2022-10-09 PROCEDURE — 36415 COLL VENOUS BLD VENIPUNCTURE: CPT | Performed by: HOSPITALIST

## 2022-10-09 PROCEDURE — 85018 HEMOGLOBIN: CPT | Performed by: PHYSICIAN ASSISTANT

## 2022-10-09 RX ORDER — WATER 10 ML/10ML
INJECTION INTRAMUSCULAR; INTRAVENOUS; SUBCUTANEOUS
Status: COMPLETED
Start: 2022-10-09 | End: 2022-10-09

## 2022-10-09 RX ADMIN — ACETAMINOPHEN 650 MG: 325 TABLET, FILM COATED ORAL at 23:39

## 2022-10-09 RX ADMIN — PANTOPRAZOLE SODIUM 40 MG: 40 INJECTION, POWDER, FOR SOLUTION INTRAVENOUS at 21:47

## 2022-10-09 RX ADMIN — PSYLLIUM HUSK 1 PACKET: 3.4 POWDER ORAL at 09:27

## 2022-10-09 RX ADMIN — PANTOPRAZOLE SODIUM 40 MG: 40 INJECTION, POWDER, FOR SOLUTION INTRAVENOUS at 09:26

## 2022-10-09 RX ADMIN — LATANOPROST 1 DROP: 50 SOLUTION/ DROPS OPHTHALMIC at 21:53

## 2022-10-09 RX ADMIN — TAMSULOSIN HYDROCHLORIDE 0.4 MG: 0.4 CAPSULE ORAL at 21:47

## 2022-10-09 RX ADMIN — WATER 10 ML: 1 INJECTION INTRAMUSCULAR; INTRAVENOUS; SUBCUTANEOUS at 21:48

## 2022-10-09 ASSESSMENT — ACTIVITIES OF DAILY LIVING (ADL)
ADLS_ACUITY_SCORE: 18

## 2022-10-09 NOTE — PROGRESS NOTES
Date/Time:10/08 ,6493-9791    Trauma/Ortho/Medical (Choose one) :Medical    Diagnosis:Rectal bleeding  Mental Status:A&O x 4  Activity/dangle:Indep in room  Diet:Reg  Pain:Denies  Loredo/Voiding:BR  Tele/Restraints/Iso:N/A  02/LDA:RA  D/C Date: Anticipate on 10/09 pending symptoms and stable Hgb  Other Info:Hgb  q 6 hrs, 7.4 at midnight, recheck again this morning pending.  Pt reported having blood while urinating MD aware while rounding this morning per  pt .Pt estimating 30 cc,x 1 BM this morning with blood in the toilet.  Continue to monitor.

## 2022-10-09 NOTE — PLAN OF CARE
Goal Outcome Evaluation:       Date/Time 10/9     Trauma/Ortho/Medical (Choose one) medical    Diagnosis: SOB, fatigue, Hemorids  Mental Status:4  Activity/dangle up ind  Diet: re  Pain:n/a  Loredo/Voiding:BR  Tele/Restraints/Iso:n/a  02/LDA: KOKI  D/C Date: tomorrow?  Other Info: HGB q.6hrs.

## 2022-10-09 NOTE — PLAN OF CARE
Goal Outcome Evaluation:  [id number]    Diagnosis: Rectal bleeding  Mental Status: A&O 4  Activity/dangle IND  Diet:reg  Pain:no C/O pain  Loredo/Voiding:BA or urinal  Tele/Restraints/Iso:NA  02/LDA: KOKI FELDMAN  D/C Date: in AM  Other Info: having a hemorrhoidectomy later this week.

## 2022-10-09 NOTE — PROGRESS NOTES
"[facility]  Hospitalist Progress Note    Aldair Bartlett MD  10/09/2022    Assessment & Plan   Raúl Marie is a 67 year old male with history of BPH, IgA nephropathy, glaucoma who presents with light-headedness, fatigue, dyspnea and chest pain on exertion in setting of 4 years of intermittent bright red blood per rectum. Admitted on 10/5/2022.     Acute on chronic gastrointestinal bleed, suspect lower   Iron deficiency anemia, severe  Acute blood loss anemia, symptomatic  *Presents with light-headedness, fatigue, dyspnea and chest pain on exertion in setting of 4 years of intermittent bright red blood per rectum, worsening over the last 2-3 weeks. Reports hx of hemorrhoids.   *Has been managing with a homeopath with an oral tincture treatment (Juditha)   *Hemoglobin 4.6 g/dl, ferritin 3, iron sat low, iron binding capacity high  *Denies NSAID use, denies melena  *Hemodynamically stable, severe iron deficiency suggests likely slow blood loss over rapid bleeding  Plan:  - GI (Kemal) consulted -  EGD and colon on 10/7  - Hemoglobin stable -> checking Q12H today  - Blood transfusion as needed if hemoglobin <7  - CRS consulted -> Ok for psyllium and home tomorrow if bleeding improves.     BPH  - continue tamsulosin      Dry cough  CXR clear   - Symptomatic management     Glaucoma  - Continue prior to admission eye drops     Diet:NPO  DVT Prophylaxis: Pneumatic Compression Devices  Loredo Catheter: Not present  Code Status:   Full code     Disposition Plan  -- anticipate on 10/10 pending symptoms and stable Hgb    Interval History      Denies nausea, emesis, abdominal pain.    Still with bloody stools, very anxious about discharge  No CP/SOB  No new complaints    -Data reviewed today: I reviewed all new labs and imaging over the last 24 hours.     Physical Exam    , Blood pressure (!) 141/78, pulse 61, temperature 97.4  F (36.3  C), temperature source Oral, resp. rate 18, height 1.753 m (5' 9\"), " weight 69.4 kg (153 lb), SpO2 98 %.  Vitals:    10/07/22 1131   Weight: 69.4 kg (153 lb)     Vital Signs with Ranges  Temp:  [97.4  F (36.3  C)-98.3  F (36.8  C)] 97.4  F (36.3  C)  Pulse:  [60-69] 61  Resp:  [17-18] 18  BP: (114-141)/(65-78) 141/78  SpO2:  [97 %-98 %] 98 %  I/O's Last 24 hours  No intake/output data recorded.     Constitutional: awake, alert, cooperative, no apparent distress.   Respiratory: CTABL   Cardiovascular: RRR with no m/r/g   GI: Normal bowel sounds, soft, non-distended, non-tender. S  Neurologic: Awake, alert, oriented to name, place and time. Motor is 5 out of 5 bilaterally. Sensory is intact.   Neuropsychiatric: normal mood and affect    Medications   All medications were reviewed.      latanoprost  1 drop Both Eyes At Bedtime     pantoprazole  40 mg Intravenous BID     psyllium  1 packet Oral Daily     sodium chloride (PF)  3 mL Intracatheter Q8H     tamsulosin  0.4 mg Oral At Bedtime        Data   Recent Labs   Lab 10/09/22  0752 10/09/22  0034 10/08/22  1804 10/08/22  1314 10/08/22  0613 10/07/22  0946 10/07/22  0545 10/06/22  0516 10/05/22  1802   WBC 6.3  --   --   --  9.2  --  5.4   < > 5.1   HGB 8.6* 7.4* 8.1*   < > 7.4*   < > 7.5*  7.5*   < > 4.6*   MCV 74*  --   --   --  75*  --  75*   < > 69*     --   --   --  288  --  264   < > 358   INR  --   --   --   --   --   --   --   --  1.10     --   --   --  142  --  143  --  138   POTASSIUM 4.1  --   --   --  4.0  --  4.0   < > 3.6   CHLORIDE 106  --   --   --  110*  --  112*  --  105   CO2 26  --   --   --  27  --  26  --  28   BUN 22  --   --   --  19  --  14  --  24   CR 1.13  --   --   --  1.23  --  1.18  --  1.19   ANIONGAP 4  --   --   --  5  --  5  --  5   SONA 8.8  --   --   --  8.5  --  8.5  --  8.5   *  --   --   --  113*  --  98  --  101*   ALBUMIN 2.8*  --   --   --  2.5*  --   --   --   --    PROTTOTAL 6.4*  --   --   --  5.2*  --   --   --   --    BILITOTAL 1.7*  --   --   --  0.4  --   --   --   --     ALKPHOS 49  --   --   --  43  --   --   --   --    ALT 28  --   --   --  27  --   --   --   --    AST 19  --   --   --  18  --   --   --   --     < > = values in this interval not displayed.       No results found for this or any previous visit (from the past 24 hour(s)).

## 2022-10-09 NOTE — PROGRESS NOTES
Colon and Rectal Surgery  Daily Progress Note    10/09/22     Subjective  Had one episode of blood in toilet bowel last night, only in past 36 hours. Asymptomatic. hgb has been relatively stable (7.4 - 8.3 - 8.1 - 7.4) without transfusion.    Objective  Intake/Output last 24 hrs:  No intake or output data in the 24 hours ending 10/08/22 0740  Temp:  [98  F (36.7  C)-98.3  F (36.8  C)] 98.3  F (36.8  C)  Pulse:  [60-69] 60  Resp:  [17] 17  BP: (114-123)/(65-73) 114/65  SpO2:  [97 %-98 %] 97 %    Physical Exam:  General: Comfortable and resting  Respiratory: non-labored breathing  Abdomen: soft, non tender, non-distended  GREENE  RRR  Perianal exam with external/internal hemorrhoids without blood  Toilet bowel reviewed with thin dark blood diluted with toilet bowel water. No clots.    Pertinent Labs  Recent Results (from the past 24 hour(s))   Hemoglobin    Collection Time: 10/08/22  1:14 PM   Result Value Ref Range    Hemoglobin 8.3 (L) 13.3 - 17.7 g/dL   Hemoglobin    Collection Time: 10/08/22  6:04 PM   Result Value Ref Range    Hemoglobin 8.1 (L) 13.3 - 17.7 g/dL   Hemoglobin    Collection Time: 10/09/22 12:34 AM   Result Value Ref Range    Hemoglobin 7.4 (L) 13.3 - 17.7 g/dL      Most recent; 8.6    Assessment/Plan: This is a 67 year old male admitted with acute blood loss anemia in the setting of hemorrhoidal disease. Hgb stable. Limited bleeding last night.     If one more hgb stable, ok to discharge home  Continue current psyllium  Ok to DC home today  Will follow-up for outpatient surgery this week    Return for increased bleeding/clots or symptoms.     Will discuss with Dr. Moe.    For questions/paging, please contact the CRS office at [phone].    Jayla Moctezuma MD  Colon and Rectal Surgery Fellow     [facility]  [address] [place] [address]  [place] [address]  T: [phone]  F: [phone]

## 2022-10-10 LAB
ALBUMIN SERPL-MCNC: 2.7 G/DL (ref 3.4–5)
ALP SERPL-CCNC: 50 U/L (ref 40–150)
ALT SERPL W P-5'-P-CCNC: 26 U/L (ref 0–70)
ANION GAP SERPL CALCULATED.3IONS-SCNC: 5 MMOL/L (ref 3–14)
AST SERPL W P-5'-P-CCNC: 9 U/L (ref 0–45)
BASOPHILS # BLD AUTO: 0 10E3/UL (ref 0–0.2)
BASOPHILS NFR BLD AUTO: 1 %
BILIRUB SERPL-MCNC: 0.6 MG/DL (ref 0.2–1.3)
BUN SERPL-MCNC: 24 MG/DL (ref 7–30)
CALCIUM SERPL-MCNC: 8.9 MG/DL (ref 8.5–10.1)
CHLORIDE BLD-SCNC: 110 MMOL/L (ref 94–109)
CO2 SERPL-SCNC: 27 MMOL/L (ref 20–32)
CREAT SERPL-MCNC: 1.32 MG/DL (ref 0.66–1.25)
EOSINOPHIL # BLD AUTO: 0.3 10E3/UL (ref 0–0.7)
EOSINOPHIL NFR BLD AUTO: 3 %
ERYTHROCYTE [DISTWIDTH] IN BLOOD BY AUTOMATED COUNT: 22.5 % (ref 10–15)
GFR SERPL CREATININE-BSD FRML MDRD: 59 ML/MIN/1.73M2
GLUCOSE BLD-MCNC: 106 MG/DL (ref 70–99)
HCT VFR BLD AUTO: 26.8 % (ref 40–53)
HGB BLD-MCNC: 8.1 G/DL (ref 13.3–17.7)
HGB BLD-MCNC: 8.2 G/DL (ref 13.3–17.7)
IMM GRANULOCYTES # BLD: 0 10E3/UL
IMM GRANULOCYTES NFR BLD: 0 %
LYMPHOCYTES # BLD AUTO: 1.1 10E3/UL (ref 0.8–5.3)
LYMPHOCYTES NFR BLD AUTO: 14 %
MCH RBC QN AUTO: 23.1 PG (ref 26.5–33)
MCHC RBC AUTO-ENTMCNC: 30.2 G/DL (ref 31.5–36.5)
MCV RBC AUTO: 76 FL (ref 78–100)
MONOCYTES # BLD AUTO: 0.5 10E3/UL (ref 0–1.3)
MONOCYTES NFR BLD AUTO: 7 %
NEUTROPHILS # BLD AUTO: 6 10E3/UL (ref 1.6–8.3)
NEUTROPHILS NFR BLD AUTO: 75 %
NRBC # BLD AUTO: 0 10E3/UL
NRBC BLD AUTO-RTO: 0 /100
PLATELET # BLD AUTO: 328 10E3/UL (ref 150–450)
POTASSIUM BLD-SCNC: 4.1 MMOL/L (ref 3.4–5.3)
PROT SERPL-MCNC: 5.9 G/DL (ref 6.8–8.8)
RBC # BLD AUTO: 3.51 10E6/UL (ref 4.4–5.9)
SODIUM SERPL-SCNC: 142 MMOL/L (ref 133–144)
WBC # BLD AUTO: 7.9 10E3/UL (ref 4–11)

## 2022-10-10 PROCEDURE — 85025 COMPLETE CBC W/AUTO DIFF WBC: CPT | Performed by: HOSPITALIST

## 2022-10-10 PROCEDURE — G0008 ADMIN INFLUENZA VIRUS VAC: HCPCS | Performed by: STUDENT IN AN ORGANIZED HEALTH CARE EDUCATION/TRAINING PROGRAM

## 2022-10-10 PROCEDURE — 90662 IIV NO PRSV INCREASED AG IM: CPT | Performed by: STUDENT IN AN ORGANIZED HEALTH CARE EDUCATION/TRAINING PROGRAM

## 2022-10-10 PROCEDURE — 250N000013 HC RX MED GY IP 250 OP 250 PS 637: Performed by: STUDENT IN AN ORGANIZED HEALTH CARE EDUCATION/TRAINING PROGRAM

## 2022-10-10 PROCEDURE — 120N000001 HC R&B MED SURG/OB

## 2022-10-10 PROCEDURE — 250N000011 HC RX IP 250 OP 636: Performed by: STUDENT IN AN ORGANIZED HEALTH CARE EDUCATION/TRAINING PROGRAM

## 2022-10-10 PROCEDURE — 80053 COMPREHEN METABOLIC PANEL: CPT | Performed by: HOSPITALIST

## 2022-10-10 PROCEDURE — 250N000013 HC RX MED GY IP 250 OP 250 PS 637: Performed by: INTERNAL MEDICINE

## 2022-10-10 PROCEDURE — 36415 COLL VENOUS BLD VENIPUNCTURE: CPT | Performed by: STUDENT IN AN ORGANIZED HEALTH CARE EDUCATION/TRAINING PROGRAM

## 2022-10-10 PROCEDURE — 85018 HEMOGLOBIN: CPT | Performed by: STUDENT IN AN ORGANIZED HEALTH CARE EDUCATION/TRAINING PROGRAM

## 2022-10-10 PROCEDURE — 36415 COLL VENOUS BLD VENIPUNCTURE: CPT | Performed by: HOSPITALIST

## 2022-10-10 PROCEDURE — 258N000003 HC RX IP 258 OP 636: Performed by: STUDENT IN AN ORGANIZED HEALTH CARE EDUCATION/TRAINING PROGRAM

## 2022-10-10 PROCEDURE — 99232 SBSQ HOSP IP/OBS MODERATE 35: CPT | Performed by: STUDENT IN AN ORGANIZED HEALTH CARE EDUCATION/TRAINING PROGRAM

## 2022-10-10 RX ORDER — PANTOPRAZOLE SODIUM 40 MG/1
40 TABLET, DELAYED RELEASE ORAL
Status: DISCONTINUED | OUTPATIENT
Start: 2022-10-10 | End: 2022-10-11 | Stop reason: HOSPADM

## 2022-10-10 RX ORDER — SODIUM CHLORIDE, SODIUM LACTATE, POTASSIUM CHLORIDE, CALCIUM CHLORIDE 600; 310; 30; 20 MG/100ML; MG/100ML; MG/100ML; MG/100ML
INJECTION, SOLUTION INTRAVENOUS CONTINUOUS
Status: DISCONTINUED | OUTPATIENT
Start: 2022-10-10 | End: 2022-10-11 | Stop reason: HOSPADM

## 2022-10-10 RX ADMIN — PANTOPRAZOLE SODIUM 40 MG: 40 TABLET, DELAYED RELEASE ORAL at 09:23

## 2022-10-10 RX ADMIN — TAMSULOSIN HYDROCHLORIDE 0.4 MG: 0.4 CAPSULE ORAL at 20:29

## 2022-10-10 RX ADMIN — SODIUM CHLORIDE, POTASSIUM CHLORIDE, SODIUM LACTATE AND CALCIUM CHLORIDE: 600; 310; 30; 20 INJECTION, SOLUTION INTRAVENOUS at 19:03

## 2022-10-10 RX ADMIN — LATANOPROST 1 DROP: 50 SOLUTION/ DROPS OPHTHALMIC at 21:57

## 2022-10-10 RX ADMIN — PSYLLIUM HUSK 1 PACKET: 3.4 POWDER ORAL at 09:23

## 2022-10-10 RX ADMIN — PANTOPRAZOLE SODIUM 40 MG: 40 TABLET, DELAYED RELEASE ORAL at 17:06

## 2022-10-10 RX ADMIN — ACETAMINOPHEN 650 MG: 325 TABLET, FILM COATED ORAL at 21:56

## 2022-10-10 RX ADMIN — SODIUM CHLORIDE, POTASSIUM CHLORIDE, SODIUM LACTATE AND CALCIUM CHLORIDE: 600; 310; 30; 20 INJECTION, SOLUTION INTRAVENOUS at 09:23

## 2022-10-10 RX ADMIN — INFLUENZA A VIRUS A/VICTORIA/2570/2019 IVR-215 (H1N1) ANTIGEN (FORMALDEHYDE INACTIVATED), INFLUENZA A VIRUS A/DARWIN/9/2021 SAN-010 (H3N2) ANTIGEN (FORMALDEHYDE INACTIVATED), INFLUENZA B VIRUS B/PHUKET/3073/2013 ANTIGEN (FORMALDEHYDE INACTIVATED), AND INFLUENZA B VIRUS B/MICHIGAN/01/2021 ANTIGEN (FORMALDEHYDE INACTIVATED) 0.7 ML: 60; 60; 60; 60 INJECTION, SUSPENSION INTRAMUSCULAR at 12:16

## 2022-10-10 ASSESSMENT — ACTIVITIES OF DAILY LIVING (ADL)
ADLS_ACUITY_SCORE: 18

## 2022-10-10 NOTE — PROGRESS NOTES
[facility]  Hospitalist Progress Note    Aldair Bartlett MD  10/10/2022    Assessment & Plan   Raúl Marie is a 67 year old male with history of BPH, IgA nephropathy, glaucoma who presents with light-headedness, fatigue, dyspnea and chest pain on exertion in setting of 4 years of intermittent bright red blood per rectum. Admitted on 10/5/2022.     Acute on chronic gastrointestinal bleed, suspect lower   Iron deficiency anemia, severe  Acute blood loss anemia, symptomatic  *Presents with light-headedness, fatigue, dyspnea and chest pain on exertion in setting of 4 years of intermittent bright red blood per rectum, worsening over the last 2-3 weeks. Reports hx of hemorrhoids.   *Has been managing with a homeopath with an oral tincture treatment (Evaristo)   *Hemoglobin 4.6 g/dl, ferritin 3, iron sat low, iron binding capacity high  *Denies NSAID use, denies melena  *Hemodynamically stable, severe iron deficiency suggests likely slow blood loss over rapid bleeding  Plan:  - GI (Kemal) consulted -  EGD and colon on 10/7  - Hemoglobin stable -> checking Q12H today, would like to see uptrend before discharge  - Blood transfusion as needed if hemoglobin <7  - CRS consulted -> Ok for psyllium and home tomorrow if bleeding improves -> Plan for outpatient hemorrhoidectomy with Dr. García this Thursday 10/13.    MONROE  Assessment/Plan: suspect pre-renal, will hydrate with IVF overnight and recheck BMP in AM     BPH  - continue tamsulosin      Dry cough  CXR clear   - Symptomatic management     Glaucoma  - Continue prior to admission eye drops     Diet:NPO  DVT Prophylaxis: Pneumatic Compression Devices  Loredo Catheter: Not present  Code Status:   Full code     Disposition Plan  -- anticipate on 10/10 pending symptoms and stable Hgb    Interval History      Denies nausea, emesis, abdominal pain.    Still with bloody stools, but stable  No CP/SOB, no lightheadedness  No new complaints    -Data reviewed  "today: I reviewed all new labs and imaging over the last 24 hours.     Physical Exam    , Blood pressure 129/75, pulse 65, temperature 97.6  F (36.4  C), temperature source Oral, resp. rate 16, height 1.753 m (5' 9\"), weight 69.4 kg (153 lb), SpO2 93 %.  Vitals:    10/07/22 1131   Weight: 69.4 kg (153 lb)     Vital Signs with Ranges  Temp:  [97.6  F (36.4  C)-98.3  F (36.8  C)] 97.6  F (36.4  C)  Pulse:  [56-65] 65  Resp:  [16-18] 16  BP: (108-129)/(65-75) 129/75  SpO2:  [93 %-96 %] 93 %  I/O's Last 24 hours  No intake/output data recorded.     Constitutional: awake, alert, cooperative, no apparent distress.   Respiratory: CTABL   Cardiovascular: RRR with no m/r/g   GI: Normal bowel sounds, soft, non-distended, non-tender. S  Neurologic: Awake, alert, oriented to name, place and time. Motor is 5 out of 5 bilaterally. Sensory is intact.   Neuropsychiatric: normal mood and affect    Medications   All medications were reviewed.    lactated ringers 100 mL/hr at 10/10/22 0923       latanoprost  1 drop Both Eyes At Bedtime     pantoprazole  40 mg Oral BID AC     psyllium  1 packet Oral Daily     sodium chloride (PF)  3 mL Intracatheter Q8H     tamsulosin  0.4 mg Oral At Bedtime        Data   Recent Labs   Lab 10/10/22  0624 10/09/22  1813 10/09/22  0752 10/08/22  1314 10/08/22  0613 10/06/22  0516 10/05/22  1802   WBC 7.9  --  6.3  --  9.2   < > 5.1   HGB 8.1* 8.0* 8.6*   < > 7.4*   < > 4.6*   MCV 76*  --  74*  --  75*   < > 69*     --  325  --  288   < > 358   INR  --   --   --   --   --   --  1.10     --  136  --  142   < > 138   POTASSIUM 4.1  --  4.1  --  4.0   < > 3.6   CHLORIDE 110*  --  106  --  110*   < > 105   CO2 27  --  26  --  27   < > 28   BUN 24  --  22  --  19   < > 24   CR 1.32*  --  1.13  --  1.23   < > 1.19   ANIONGAP 5  --  4  --  5   < > 5   SONA 8.9  --  8.8  --  8.5   < > 8.5   *  --  101*  --  113*   < > 101*   ALBUMIN 2.7*  --  2.8*  --  2.5*   < >  --    PROTTOTAL 5.9*  --  6.4* "  --  5.2*   < >  --    BILITOTAL 0.6  --  1.7*  --  0.4   < >  --    ALKPHOS 50  --  49  --  43   < >  --    ALT 26  --  28  --  27   < >  --    AST 9  --  19  --  18   < >  --     < > = values in this interval not displayed.       No results found for this or any previous visit (from the past 24 hour(s)).

## 2022-10-10 NOTE — TELEPHONE ENCOUNTER
Diagnosis, Referred by & from: Hemorrhoid removal , Referred by Alison Trevizo PA-C ([facility])    Appt date: 10/11/2022, 1:15 PM    NOTES STATUS DETAILS   OFFICE NOTE from referring provider Internal 10/5/2022 ED-Admission    OFFICE NOTE from other specialist N/A    DISCHARGE SUMMARY from hospital N/A    DISCHARGE REPORT from the ER N/A    OPERATIVE REPORT N/A    MEDICATION LIST Internal    LABS     ANAL PAP/CEA N/A    BIOPSIES/PATHOLOGY RELATED TO DIAGNOSIS Internal 10/7/2022   DIAGNOSTIC PROCEDURES     PFC TESTING (from the Pelvic floor center includes Manometry, PDNL, EMG, etc.) N/A    COLONOSCOPY Internal 10/7/2022   UPPER ENDOSCOPY (EGD) N/A    FLEX SIGMOIDOSCOPY Internal 10/7/2022   ERCP N/A    IMAGING (DISC & REPORT)      CT N/A    MRI N/A    XRAY N/A    ULTRASOUND  (ENDOANAL/ENDORECTAL) N/A

## 2022-10-10 NOTE — PLAN OF CARE
Goal Outcome Evaluation:  Diagnosis: Rectal bleeding  Mental Status: Alert and oriented x 4  Activity/dangle: Independent in room.   Diet:regular  Pain:denies pain  Loredo/Voiding:BA or urinal  Tele/Restraints/Iso:NA  02/LDA: TRISTEN LUNDY  D/C Date: pending symptoms and stable Hgb     Other Info: Hgb check Q12  To continue to monitor.

## 2022-10-10 NOTE — PROGRESS NOTES
[facility]  PROGRESS NOTE    October 10, 2022    SUBJECTIVE:  Had some blood with his BM again this morning. Denies dizziness or SOB. Hgb remained overall stable over the weekend, 8.1 this morning. Still quite anxious about leaving the hospital and wants hemorrhoidectomy ASAP.     OBJECTIVE:  Temp:  [97.6  F (36.4  C)-98.3  F (36.8  C)] 97.6  F (36.4  C)  Pulse:  [56-65] 65  Resp:  [16-18] 16  BP: (108-129)/(65-75) 129/75  SpO2:  [93 %-96 %] 93 %  No intake or output data in the 24 hours ending 10/10/22 0921    GENERAL:  Awake, alert, no acute distress  HEAD: Normocephalic atraumatic  SCLERA: Anicteric      LABS:  Lab Results   Component Value Date    WBC 7.9 10/10/2022    WBC 8.4 01/11/2011     Lab Results   Component Value Date    HGB 8.1 10/10/2022    HGB 15.1 01/11/2011     Lab Results   Component Value Date    HCT 26.8 10/10/2022    HCT 44.6 01/11/2011     Lab Results   Component Value Date     10/10/2022     01/11/2011     Last Basic Metabolic Panel:  Lab Results   Component Value Date     10/10/2022     01/11/2011      Lab Results   Component Value Date    POTASSIUM 4.1 10/10/2022    POTASSIUM 4.0 01/11/2011     Lab Results   Component Value Date    CHLORIDE 110 10/10/2022    CHLORIDE 105 01/11/2011     Lab Results   Component Value Date    SONA 8.9 10/10/2022    SONA 9.3 01/11/2011     Lab Results   Component Value Date    CO2 27 10/10/2022    CO2 25 01/11/2011     Lab Results   Component Value Date    BUN 24 10/10/2022    BUN 24 01/11/2011     Lab Results   Component Value Date    CR 1.32 10/10/2022    CR 1.06 01/11/2011     Lab Results   Component Value Date     10/10/2022    GLC 95 01/11/2011       ASSESSMENT/PLAN: 66 yo M admitted with acute blood loss anemia in the setting of hemorrhoidal disease. Has some ongoing bleeding with bowel movements but Hgb stable over the weekend.     1. Regular diet  2. Continue psyllium, encouraged him to use Miralax at home as  needed to keep stools soft  3. Ok to discharge from CRS perspective. Plan for outpatient hemorrhoidectomy with Dr. García this Thursday 10/13. Our clinic will contact him with scheduling details.      For questions/paging, please contact the CRS office at [phone].    Ron Rice PA-C  Colorectal Physician Assistant    [facility]  [address]  [place] [address]  T: [phone]  F: [phone]    CRS Staff.  Seen and examined independently.  Agree with above.    I performed a history and physical examination of the patient and discussed their management with the physician assistant. I reviewed the physician assistants note and agree with the documented findings and plan of care.     Obie García MD FACS FASCRS  Colorectal Surgeon       [facility]  [address], [address]  [place] [address]  T: [phone]  F: [phone]  Pager: [phone]  [email or web address]

## 2022-10-10 NOTE — PLAN OF CARE
Diagnosis: rectal bleed - stable  Mental Status: A&O  Activity/dangle IND,   Diet: regular  Pain: denies  Loredo/Voiding: voiding  Tele/Restraints/Iso: NA  02/LDA: IVF  D/C Date:? 10/11 pending stable HGB, improved creat.  Other Info: EGD/colonscopy  10/7, scheduled for hemorrhoidectomy Thursday

## 2022-10-10 NOTE — DISCHARGE INSTRUCTIONS
Plan for outpatient hemorrhoidectomy with Dr. García this Thursday 10/13. The clinic will call you with details.      [facility]  [address]  [place] [address]  T: [phone]  F: [phone]

## 2022-10-11 ENCOUNTER — PRE VISIT (OUTPATIENT)
Dept: SURGERY | Facility: CLINIC | Age: 67
End: 2022-10-11

## 2022-10-11 VITALS
DIASTOLIC BLOOD PRESSURE: 66 MMHG | TEMPERATURE: 98.2 F | RESPIRATION RATE: 16 BRPM | WEIGHT: 153 LBS | HEIGHT: 69 IN | HEART RATE: 64 BPM | BODY MASS INDEX: 22.66 KG/M2 | OXYGEN SATURATION: 99 % | SYSTOLIC BLOOD PRESSURE: 126 MMHG

## 2022-10-11 LAB
HGB BLD-MCNC: 8.1 G/DL (ref 13.3–17.7)
PATH REPORT.COMMENTS IMP SPEC: NORMAL
PATH REPORT.COMMENTS IMP SPEC: NORMAL
PATH REPORT.FINAL DX SPEC: NORMAL
PATH REPORT.GROSS SPEC: NORMAL
PATH REPORT.MICROSCOPIC SPEC OTHER STN: NORMAL
PATH REPORT.MICROSCOPIC SPEC OTHER STN: NORMAL
PATH REPORT.RELEVANT HX SPEC: NORMAL
PHOTO IMAGE: NORMAL
POTASSIUM BLD-SCNC: 3.9 MMOL/L (ref 3.4–5.3)

## 2022-10-11 PROCEDURE — 36415 COLL VENOUS BLD VENIPUNCTURE: CPT | Performed by: STUDENT IN AN ORGANIZED HEALTH CARE EDUCATION/TRAINING PROGRAM

## 2022-10-11 PROCEDURE — 88305 TISSUE EXAM BY PATHOLOGIST: CPT | Mod: 26 | Performed by: PATHOLOGY

## 2022-10-11 PROCEDURE — 99239 HOSP IP/OBS DSCHRG MGMT >30: CPT | Performed by: STUDENT IN AN ORGANIZED HEALTH CARE EDUCATION/TRAINING PROGRAM

## 2022-10-11 PROCEDURE — 88342 IMHCHEM/IMCYTCHM 1ST ANTB: CPT | Mod: 26 | Performed by: PATHOLOGY

## 2022-10-11 PROCEDURE — 258N000003 HC RX IP 258 OP 636: Performed by: STUDENT IN AN ORGANIZED HEALTH CARE EDUCATION/TRAINING PROGRAM

## 2022-10-11 PROCEDURE — 250N000013 HC RX MED GY IP 250 OP 250 PS 637: Performed by: STUDENT IN AN ORGANIZED HEALTH CARE EDUCATION/TRAINING PROGRAM

## 2022-10-11 PROCEDURE — 85018 HEMOGLOBIN: CPT | Performed by: STUDENT IN AN ORGANIZED HEALTH CARE EDUCATION/TRAINING PROGRAM

## 2022-10-11 PROCEDURE — 84132 ASSAY OF SERUM POTASSIUM: CPT | Performed by: STUDENT IN AN ORGANIZED HEALTH CARE EDUCATION/TRAINING PROGRAM

## 2022-10-11 RX ORDER — DOXYCYCLINE 100 MG/1
100 CAPSULE ORAL 2 TIMES DAILY
Qty: 10 CAPSULE | Refills: 0 | Status: SHIPPED | OUTPATIENT
Start: 2022-10-11 | End: 2022-10-16

## 2022-10-11 RX ADMIN — PANTOPRAZOLE SODIUM 40 MG: 40 TABLET, DELAYED RELEASE ORAL at 07:01

## 2022-10-11 RX ADMIN — PSYLLIUM HUSK 1 PACKET: 3.4 POWDER ORAL at 08:27

## 2022-10-11 RX ADMIN — SODIUM CHLORIDE, POTASSIUM CHLORIDE, SODIUM LACTATE AND CALCIUM CHLORIDE: 600; 310; 30; 20 INJECTION, SOLUTION INTRAVENOUS at 05:56

## 2022-10-11 ASSESSMENT — ACTIVITIES OF DAILY LIVING (ADL)
ADLS_ACUITY_SCORE: 18

## 2022-10-11 NOTE — DISCHARGE SUMMARY
[facility]  Hospitalist Discharge Summary      Date of Admission:  10/5/2022  Date of Discharge:  10/11/2022  Discharging Provider: Aldair Bartlett MD  Discharge Service: Hospitalist Service    Discharge Diagnoses     Iron deficiency anemia, severe  Acute blood loss anemia, symptomatic    Follow-ups Needed After Discharge   Follow-up Appointments     Follow-up and recommended labs and tests       Follow up with primary care provider, Steve Garcia, within 7 days for   hospital follow- up.  The following labs/tests are recommended: CBC.             Unresulted Labs Ordered in the Past 30 Days of this Admission     Date and Time Order Name Status Description    10/7/2022 12:20 PM Surgical Pathology Exam In process         Discharge Disposition     Discharged to home  Condition at discharge: Stable    Hospital Course   Raúl Marie is a 67 year old male with history of BPH, IgA nephropathy, glaucoma who presents with light-headedness, fatigue, dyspnea and chest pain on exertion in setting of 4 years of intermittent bright red blood per rectum. Admitted on 10/5/2022.     Acute on chronic gastrointestinal bleed, suspect lower   Iron deficiency anemia, severe  Acute blood loss anemia, symptomatic  *Presents with light-headedness, fatigue, dyspnea and chest pain on exertion in setting of 4 years of intermittent bright red blood per rectum, worsening over the last 2-3 weeks. Reports hx of hemorrhoids.   *Has been managing with a homeopath with an oral tincture treatment (Evaristo)   *Hemoglobin 4.6 g/dl, ferritin 3, iron sat low, iron binding capacity high  *Denies NSAID use, denies melena  *Hemodynamically stable, severe iron deficiency suggests likely slow blood loss over rapid bleeding  Plan:  - GI (Kemal) consulted -  EGD and colon on 10/7  - Hemoglobin stable   - CRS consulted -> Ok for psyllium and home -> Plan for outpatient hemorrhoidectomy with Dr. García this Thursday 10/13.      Left  Upper Extremity Cellulitis  Assessment/Plan: some warmth / erythema around LUE IV site. Suspect early cellulitis -> discharge with short course of Doxycycline.    MONROE  Metabolic acidosis  Hyperchloremia  Assessment/Plan: suspect pre-renal, hydrated with IVF.     BPH  - continue tamsulosin      Dry cough  CXR clear   - Symptomatic management     Glaucoma  - Continue prior to admission eye drops     Hypoalbuminemia. Follow as outpatient    Consultations This Hospital Stay   GASTROENTEROLOGY IP CONSULT  COLORECTAL SURGERY IP CONSULT    Code Status   Full Code    Time Spent on this Encounter   I, Aldair Bartlett MD, personally saw the patient today and spent greater than 30 minutes discharging this patient.       Aldair Bartlett MD  [facility]  [address] [address]  Phone: [phone]  Fax: [phone]  ______________________________________________________________________    Physical Exam   Vital Signs: Temp: 98.2  F (36.8  C) Temp src: Oral BP: 126/66 Pulse: 64   Resp: 16 SpO2: 99 % O2 Device: None (Room air)    Weight: 153 lbs 0 oz       Primary Care Physician   Steve Garcia    Discharge Orders      Reason for your hospital stay    You had bleeding from hemorrhoids.     Follow-up and recommended labs and tests     Follow up with primary care provider, Steve Garcia, within 7 days for hospital follow- up.  The following labs/tests are recommended: CBC.     Activity    Your activity upon discharge: activity as tolerated     Diet    Follow this diet upon discharge: Orders Placed This Encounter      Regular Diet Adult       Significant Results and Procedures   Most Recent 3 CBC's:  Recent Labs   Lab Test 10/11/22  0703 10/10/22  1832 10/10/22  0624 10/09/22  1813 10/09/22  0752 10/08/22  1314 10/08/22  0613   WBC  --   --  7.9  --  6.3  --  9.2   HGB 8.1* 8.2* 8.1*   < > 8.6*   < > 7.4*   MCV  --   --  76*  --  74*  --  75*   PLT  --   --  328  --  325  --  288    < > =  values in this interval not displayed.     Most Recent 3 BMP's:  Recent Labs   Lab Test 10/11/22  0703 10/10/22  0624 10/09/22  0752 10/08/22  0613   NA  --  142 136 142   POTASSIUM 3.9 4.1 4.1 4.0   CHLORIDE  --  110* 106 110*   CO2  --  27 26 27   BUN  --  24 22 19   CR  --  1.32* 1.13 1.23   ANIONGAP  --  5 4 5   SONA  --  8.9 8.8 8.5   GLC  --  106* 101* 113*     Most Recent 2 LFT's:  Recent Labs   Lab Test 10/10/22  0624 10/09/22  0752   AST 9 19   ALT 26 28   ALKPHOS 50 49   BILITOTAL 0.6 1.7*     Most Recent 3 INR's:  Recent Labs   Lab Test 10/05/22  1802   INR 1.10   ,   Results for orders placed or performed during the hospital encounter of 10/05/22   Chest XR,  PA & LAT    Narrative    EXAM: XR CHEST 2 VIEWS  LOCATION: [facility]  DATE/TIME: 10/5/2022 7:31 PM    INDICATION: cough  COMPARISON: 1/11/2011      Impression    IMPRESSION: Heart size and pulmonary vascularity normal. The lungs are clear. Mild thoracolumbar spinal curve.       Discharge Medications   Current Discharge Medication List      START taking these medications    Details   doxycycline hyclate (VIBRAMYCIN) 100 MG capsule Take 1 capsule (100 mg) by mouth 2 times daily for 5 days  Qty: 10 capsule, Refills: 0    Associated Diagnoses: Cellulitis of left upper extremity      psyllium (METAMUCIL/KONSYL) Packet Take 1 packet by mouth daily  Qty: 30 packet, Refills: 0    Associated Diagnoses: Anemia, unspecified type; Rectal bleeding         CONTINUE these medications which have NOT CHANGED    Details   latanoprost (XALATAN) 0.005 % ophthalmic solution Place 1 drop into both eyes daily      tamsulosin (FLOMAX) 0.4 MG capsule Take 0.4 mg by mouth daily           Allergies   Allergies   Allergen Reactions     Chlorpheniramine-Phenylephrine Itching and Other (See Comments)     Other reaction(s): Other (See Comments)     Walnuts [Nuts]      Wheat Extract

## 2022-10-11 NOTE — PLAN OF CARE
Goal Outcome Evaluation:  A/Ox4, patient states minimal pain to rectal area - declined PRN Tylenol when offered, up independently in room, tolerating diet, patient reports having a BM this morning with no new bleeding, hospitalist ordered PO antibiotic for red streak on left forearm, discharge instructions reviewed with patient, and patient discharged home.

## 2022-10-11 NOTE — PLAN OF CARE
Goal Outcome Evaluation:       Date/Time 10/10 [id number]    Trauma/Ortho/Medical (Choose one) medical    Diagnosis: SOB, fatigue, Hemorids  Mental Status:4  Activity/dangle up ind  Diet: re  Pain:n/a  Loredo/Voiding:BR  Tele/Restraints/Iso:n/a  02/LDA: KOKI  D/C Date: tomorrow?  Other Info: HGB q.12hrs.

## 2022-10-11 NOTE — PLAN OF CARE
Goal Outcome Evaluation:    Pt is A & O x 4. Lungs sound clear, bowel sounds active, cms intact. Complain of left arm pain but refused med. Up independently. VSS. Still having some rectal bleed. Hgb stable, recheck this morning. IV fluid infusing. Will continue to monitor.

## 2022-10-12 ENCOUNTER — PATIENT OUTREACH (OUTPATIENT)
Dept: CARE COORDINATION | Facility: CLINIC | Age: 67
End: 2022-10-12

## 2022-10-12 NOTE — PROGRESS NOTES
[facility]    Background: Transitional Care Management program auto-identified and prompting a chart review by [facility] team.    Assessment: Upon chart review, CCR Team member will cancel/close this episode of Transitional Care Management program due to reason below:    Patient declined to answer all  post hospital discharge questions with CCRC team member and disconnected call.    Plan: Transitional Care Management episode closed per reason above.      Stephie Lucero MA  [facility]    *Connected Care Resource Team does NOT follow patient ongoing. Referrals are identified based on internal discharge reports and the outreach is to ensure patient has an understanding of their discharge instructions.

## 2022-10-13 ENCOUNTER — LAB REQUISITION (OUTPATIENT)
Dept: LAB | Facility: CLINIC | Age: 67
End: 2022-10-13
Payer: MEDICARE

## 2022-10-13 ENCOUNTER — HOSPITAL ENCOUNTER (EMERGENCY)
Facility: CLINIC | Age: 67
Discharge: HOME | End: 2022-10-13
Attending: EMERGENCY MEDICINE | Admitting: EMERGENCY MEDICINE
Payer: MEDICARE

## 2022-10-13 VITALS
OXYGEN SATURATION: 97 % | HEART RATE: 87 BPM | DIASTOLIC BLOOD PRESSURE: 70 MMHG | SYSTOLIC BLOOD PRESSURE: 124 MMHG | TEMPERATURE: 96.9 F | HEIGHT: 68 IN | WEIGHT: 155 LBS | BODY MASS INDEX: 23.49 KG/M2 | RESPIRATION RATE: 20 BRPM

## 2022-10-13 DIAGNOSIS — D64.9 ANEMIA, UNSPECIFIED TYPE: ICD-10-CM

## 2022-10-13 DIAGNOSIS — R33.9 URINARY RETENTION: ICD-10-CM

## 2022-10-13 LAB
ABO/RH(D): NORMAL
ALBUMIN SERPL-MCNC: 3 G/DL (ref 3.4–5)
ALP SERPL-CCNC: 60 U/L (ref 40–150)
ALT SERPL W P-5'-P-CCNC: 23 U/L (ref 0–70)
ANION GAP SERPL CALCULATED.3IONS-SCNC: 6 MMOL/L (ref 3–14)
ANTIBODY SCREEN: NEGATIVE
AST SERPL W P-5'-P-CCNC: 16 U/L (ref 0–45)
ATRIAL RATE - MUSE: 77 BPM
BASOPHILS # BLD AUTO: 0 10E3/UL (ref 0–0.2)
BASOPHILS NFR BLD AUTO: 0 %
BILIRUB SERPL-MCNC: 1.2 MG/DL (ref 0.2–1.3)
BUN SERPL-MCNC: 24 MG/DL (ref 7–30)
CALCIUM SERPL-MCNC: 8.4 MG/DL (ref 8.5–10.1)
CHLORIDE BLD-SCNC: 100 MMOL/L (ref 94–109)
CO2 SERPL-SCNC: 26 MMOL/L (ref 20–32)
CREAT SERPL-MCNC: 1 MG/DL (ref 0.66–1.25)
DIASTOLIC BLOOD PRESSURE - MUSE: NORMAL MMHG
EOSINOPHIL # BLD AUTO: 0 10E3/UL (ref 0–0.7)
EOSINOPHIL NFR BLD AUTO: 0 %
ERYTHROCYTE [DISTWIDTH] IN BLOOD BY AUTOMATED COUNT: 22.7 % (ref 10–15)
GFR SERPL CREATININE-BSD FRML MDRD: 82 ML/MIN/1.73M2
GLUCOSE BLD-MCNC: 165 MG/DL (ref 70–99)
HCT VFR BLD AUTO: 26.6 % (ref 40–53)
HGB BLD-MCNC: 8 G/DL (ref 13.3–17.7)
IMM GRANULOCYTES # BLD: 0.1 10E3/UL
IMM GRANULOCYTES NFR BLD: 1 %
INR PPP: 1.11 (ref 0.85–1.15)
INTERPRETATION ECG - MUSE: NORMAL
LYMPHOCYTES # BLD AUTO: 0.6 10E3/UL (ref 0.8–5.3)
LYMPHOCYTES NFR BLD AUTO: 4 %
MCH RBC QN AUTO: 22.9 PG (ref 26.5–33)
MCHC RBC AUTO-ENTMCNC: 30.1 G/DL (ref 31.5–36.5)
MCV RBC AUTO: 76 FL (ref 78–100)
MONOCYTES # BLD AUTO: 0.5 10E3/UL (ref 0–1.3)
MONOCYTES NFR BLD AUTO: 4 %
NEUTROPHILS # BLD AUTO: 12 10E3/UL (ref 1.6–8.3)
NEUTROPHILS NFR BLD AUTO: 91 %
NRBC # BLD AUTO: 0 10E3/UL
NRBC BLD AUTO-RTO: 0 /100
P AXIS - MUSE: 62 DEGREES
PLATELET # BLD AUTO: 288 10E3/UL (ref 150–450)
POTASSIUM BLD-SCNC: 3.7 MMOL/L (ref 3.4–5.3)
PR INTERVAL - MUSE: 168 MS
PROT SERPL-MCNC: 6.8 G/DL (ref 6.8–8.8)
QRS DURATION - MUSE: 90 MS
QT - MUSE: 394 MS
QTC - MUSE: 445 MS
R AXIS - MUSE: 58 DEGREES
RBC # BLD AUTO: 3.49 10E6/UL (ref 4.4–5.9)
SODIUM SERPL-SCNC: 132 MMOL/L (ref 133–144)
SPECIMEN EXPIRATION DATE: NORMAL
SYSTOLIC BLOOD PRESSURE - MUSE: NORMAL MMHG
T AXIS - MUSE: 46 DEGREES
TROPONIN I SERPL HS-MCNC: 5 NG/L
VENTRICULAR RATE- MUSE: 77 BPM
WBC # BLD AUTO: 13.1 10E3/UL (ref 4–11)

## 2022-10-13 PROCEDURE — 250N000013 HC RX MED GY IP 250 OP 250 PS 637: Performed by: EMERGENCY MEDICINE

## 2022-10-13 PROCEDURE — 86901 BLOOD TYPING SEROLOGIC RH(D): CPT | Performed by: EMERGENCY MEDICINE

## 2022-10-13 PROCEDURE — 85025 COMPLETE CBC W/AUTO DIFF WBC: CPT | Performed by: EMERGENCY MEDICINE

## 2022-10-13 PROCEDURE — 93005 ELECTROCARDIOGRAM TRACING: CPT | Mod: XU

## 2022-10-13 PROCEDURE — 99284 EMERGENCY DEPT VISIT MOD MDM: CPT | Mod: 25

## 2022-10-13 PROCEDURE — 85610 PROTHROMBIN TIME: CPT | Performed by: EMERGENCY MEDICINE

## 2022-10-13 PROCEDURE — 80053 COMPREHEN METABOLIC PANEL: CPT | Performed by: EMERGENCY MEDICINE

## 2022-10-13 PROCEDURE — 96360 HYDRATION IV INFUSION INIT: CPT

## 2022-10-13 PROCEDURE — 36415 COLL VENOUS BLD VENIPUNCTURE: CPT | Performed by: EMERGENCY MEDICINE

## 2022-10-13 PROCEDURE — 84484 ASSAY OF TROPONIN QUANT: CPT | Performed by: EMERGENCY MEDICINE

## 2022-10-13 PROCEDURE — 258N000003 HC RX IP 258 OP 636: Performed by: EMERGENCY MEDICINE

## 2022-10-13 PROCEDURE — 51702 INSERT TEMP BLADDER CATH: CPT

## 2022-10-13 PROCEDURE — 88304 TISSUE EXAM BY PATHOLOGIST: CPT | Mod: TC,ORL | Performed by: COLON & RECTAL SURGERY

## 2022-10-13 RX ORDER — OXYCODONE HYDROCHLORIDE 5 MG/1
10 TABLET ORAL ONCE
Status: COMPLETED | OUTPATIENT
Start: 2022-10-13 | End: 2022-10-13

## 2022-10-13 RX ADMIN — OXYCODONE HYDROCHLORIDE 10 MG: 5 TABLET ORAL at 15:44

## 2022-10-13 RX ADMIN — SODIUM CHLORIDE 1000 ML: 9 INJECTION, SOLUTION INTRAVENOUS at 15:33

## 2022-10-13 ASSESSMENT — ENCOUNTER SYMPTOMS
NAUSEA: 1
TREMORS: 1

## 2022-10-13 ASSESSMENT — ACTIVITIES OF DAILY LIVING (ADL): ADLS_ACUITY_SCORE: 35

## 2022-10-13 NOTE — ED NOTES
Ambulation trial per MD request:    Pt able to ambulate with standby assist. Pt reports feelings of fatigue and nausea. Pt reports he would like to stay inpatient tonight. Information relayed to MD ALICIA to speak with pt and wife

## 2022-10-13 NOTE — PHARMACY-ADMISSION MEDICATION HISTORY
Pharmacy Medication History  Admission medication history interview status for the 10/13/2022  admission is complete. See EPIC admission navigator for prior to admission medications     Location of Interview: Patient room  Medication history sources: Patient, Surescripts and chart review    Significant changes made to the medication list:  None    In the past week, patient estimated taking medication this percent of the time: greater than 90%    Additional medication history information:   Patient notes he has ~3 days of doxycycline left.     Medication reconciliation completed by provider prior to medication history? No    Time spent in this activity: 10 min     Prior to Admission medications    Medication Sig Last Dose Taking? Auth Provider Long Term End Date   doxycycline hyclate (VIBRAMYCIN) 100 MG capsule Take 1 capsule (100 mg) by mouth 2 times daily for 5 days 10/12/2022 Yes Aldair Bartlett MD  10/16/22   latanoprost (XALATAN) 0.005 % ophthalmic solution Place 1 drop into both eyes every evening 10/12/2022 Yes Reported, Patient Yes    psyllium (METAMUCIL/KONSYL) Packet Take 1 packet by mouth daily 10/12/2022 Yes Aldair Bartlett MD     tamsulosin (FLOMAX) 0.4 MG capsule Take 0.4 mg by mouth daily 10/12/2022 Yes Unknown, Entered By History         The information provided in this note is only as accurate as the sources available at the time of update(s)

## 2022-10-13 NOTE — ED TRIAGE NOTES
Pt hx of IgA nephropathy, BPH, glaucoma. Had been seen this week for bleeding, HGB was 4.9 had 4units of PRBC. Today at surgery center he had hemorrhoidectomy. Prior to surgery hgb was 8.6 after surgery dropped to 6.9. C/O urinary retention as well. Pt is pale, cool but a&o     Triage Assessment     Row Name 10/13/22 8500       Triage Assessment (Adult)    Airway WDL WDL       Respiratory WDL    Respiratory WDL WDL       Skin Circulation/Temperature WDL    Skin Circulation/Temperature WDL WDL       Cardiac WDL    Cardiac WDL WDL       Peripheral/Neurovascular WDL    Peripheral Neurovascular WDL WDL       Cognitive/Neuro/Behavioral WDL    Cognitive/Neuro/Behavioral WDL WDL

## 2022-10-13 NOTE — ED PROVIDER NOTES
History   Chief Complaint:  Urine retention and low hemoglobin      The history is provided by the patient.      Raúl Marie is a 67 year old male with history of hyperlipdemia and hemorrhoids who presents with urine retention and low hemoglobin after surgery this morning. He was admitted to the hospital 8 days ago with rectal bleeding and for a hemoglobin of 4.6 and received 4 units of blood. This morning he reported for a hemorrhoidectomy which was completed. There was minimal blood loss during the surgery. He was noted to be more pale, shaky, and nauseous. Prior to the surgery his hemoglobin was 8.6 and dropped to 6.9 30 minutes ago. He has complaints of urine retention and denies this symptom in the past.     Review of Systems   Gastrointestinal: Positive for nausea.   Genitourinary: Positive for decreased urine volume.   Skin: Positive for pallor.   Neurological: Positive for tremors.   All other systems reviewed and are negative.    Allergies:   Chlorpheniramine-Phenylephrine    Medications:  Tamsulosin   Atrovent   Latanoprost   Cholecalciferol      Past Medical History:     Hyperlipidemia   Hemorrhoids   Primary open angle glaucoma  Moderate episode of major depressive disorder   Primary open angle glaucoma  Vitamin D deficiency   Glomerulonephritis  Chronic kidney disease stage 3   Bronchitis  IgA nephropathy   Shingles   Calculus of kidney      Past Surgical History:    Tonsillectomy and adenoidectomy   Appendectomy   Cysto with urethral dilation   [place] laser surgery of eye   Kidney stone surgery      Family History:    Father: dementia, diabetes  Mother: dementia     Social History:  The patient presents to the ED with his wife.   Occupation:   He is from [place].   PCP: Steve Garcia     Physical Exam     Patient Vitals for the past 24 hrs:   BP Temp Temp src Pulse Resp SpO2 Height Weight   10/13/22 1700 124/70 -- -- 87 20 97 % -- --   10/13/22 1648 125/66 -- -- 66 9 98 % -- --  "  10/13/22 1645 125/66 -- -- 71 9 97 % -- --   10/13/22 1633 (!) 147/66 -- -- 84 17 98 % -- --   10/13/22 1622 (!) 147/70 -- -- 92 25 100 % -- --   10/13/22 1621 -- -- -- 92 19 -- -- --   10/13/22 1618 (!) 147/70 -- -- 94 13 -- -- --   10/13/22 1607 -- -- -- 70 17 98 % -- --   10/13/22 1606 (!) 144/66 -- -- 66 9 97 % -- --   10/13/22 1552 -- -- -- 80 14 98 % -- --   10/13/22 1551 114/70 -- -- 85 12 98 % -- --   10/13/22 1540 133/73 -- -- 72 9 100 % -- --   10/13/22 1537 115/74 -- -- 65 8 99 % -- --   10/13/22 1531 -- -- -- -- -- -- 1.727 m (5' 8\") --   10/13/22 1525 126/68 -- -- 71 15 99 % -- --   10/13/22 1520 96/71 -- -- 80 12 98 % -- --   10/13/22 1433 (!) 166/84 96.9  F (36.1  C) Temporal 75 16 98 % -- 70.3 kg (155 lb)       Physical Exam  Constitutional: Thin white male sitting in no respiratory distress.   HENT: No signs of trauma.   Eyes: EOM are normal. Pupils are equal, round, and reactive to light.   Neck: Normal range of motion. No JVD present. No tracheal deviation present. No cervical adenopathy.  Cardiovascular: Regular rhythm.  Exam reveals no gallop and no friction rub.    No murmur heard.  Pulmonary/Chest: Bilateral breath sounds normal. No wheezes, rhonchi or rales.  Abdominal: Lower abdominal distended consistent with enlarged bladder.   Musculoskeletal: Left upper arm 2x4 centimeter redness and palpable superficial thrombosis vein.   Lymphadenopathy: No lymphadenopathy.   Neurological: Alert and oriented to person, place, and time. Normal strength. Coordination normal.   Skin: Pale.   Rectal: deferred    Emergency Department Course   ECG  ECG taken at 1524, ECG read at 1525   Normal sinus rhythm  Nonspecific ST abnormality    Rate 77 bpm. MT interval 168 ms. QRS duration 90 ms. QT/QTc 394/445 ms. P-R-T axes 62 58 46.     Laboratory:  Labs Ordered and Resulted from Time of ED Arrival to Time of ED Departure   COMPREHENSIVE METABOLIC PANEL - Abnormal       Result Value    Sodium 132 (*)     " Potassium 3.7      Chloride 100      Carbon Dioxide (CO2) 26      Anion Gap 6      Urea Nitrogen 24      Creatinine 1.00      Calcium 8.4 (*)     Glucose 165 (*)     Alkaline Phosphatase 60      AST 16      ALT 23      Protein Total 6.8      Albumin 3.0 (*)     Bilirubin Total 1.2      GFR Estimate 82     CBC WITH PLATELETS AND DIFFERENTIAL - Abnormal    WBC Count 13.1 (*)     RBC Count 3.49 (*)     Hemoglobin 8.0 (*)     Hematocrit 26.6 (*)     MCV 76 (*)     MCH 22.9 (*)     MCHC 30.1 (*)     RDW 22.7 (*)     Platelet Count 288      % Neutrophils 91      % Lymphocytes 4      % Monocytes 4      % Eosinophils 0      % Basophils 0      % Immature Granulocytes 1      NRBCs per 100 WBC 0      Absolute Neutrophils 12.0 (*)     Absolute Lymphocytes 0.6 (*)     Absolute Monocytes 0.5      Absolute Eosinophils 0.0      Absolute Basophils 0.0      Absolute Immature Granulocytes 0.1      Absolute NRBCs 0.0     INR - Normal    INR 1.11     TROPONIN I - Normal    Troponin I High Sensitivity 5     TYPE AND SCREEN, ADULT    ABO/RH(D) O POS      Antibody Screen Negative      SPECIMEN EXPIRATION DATE [id number]     ABO/RH TYPE AND SCREEN     Emergency Department Course:    Reviewed:  I reviewed nursing notes, vitals, past medical history and Care Everywhere    Assessments:  1435 I obtained history and examined the patient as noted above.   1710 I rechecked the patient and explained findings.     Consults:  1652 I spoke with Dr. García, colon and rectal surgery, regarding the patient.     Interventions:  1533 NS 1000 mL IV  1544 Roxicodone 10 mg PO    Disposition:  The patient was discharged to home.     Impression & Plan     Medical Decision Making:  This was a 67 year old male who was seen in the hospital several weeks ago with low hemoglobin and required blood transfusion. He was worked up for any other cause of GI bleeding and none was found other than his hemorrhoids. He underwent a hemorrhoidectomy today. His hemoglobin  pre-op was 8.1 and when they rechecked it afterwards it was 6.9. Also he was not able to void. The patient was slightly diaphoretic, pale, and he had no chest pain or pressure. Labs were obtained. Hemoglobin here was 8.0. Creatinine was also normal. He received IV fluids. He had a catheter put in with a large amount of urine out. He had a troponin and EKG done which were unremarkable. The patient is able to ambulate and take fluids. He appears stable for discharge. He will be sent out. He should keep his follow up with Dr. García. I have referred him to [facility] to have the catheter removed next week. We did look for any previous urology. He ws seen since he does have BPH but could not find one so he was given urology on call. He was unaware of who he had seen in the past as well.     Diagnosis:    ICD-10-CM    1. Urinary retention  R33.9       2. Anemia, unspecified type  D64.9         Scribe Disclosure:  I, AMANDA TAYLOR, am serving as a scribe at 2:35 PM on 10/13/2022 to document services personally performed by Charles Wilson MD based on my observations and the provider's statements to me.        Charles Wilson MD  10/13/22 2013

## 2022-10-14 LAB
GLUCOSE BLDC GLUCOMTR-MCNC: 166 MG/DL (ref 70–99)
PATH REPORT.COMMENTS IMP SPEC: NORMAL
PATH REPORT.COMMENTS IMP SPEC: NORMAL
PATH REPORT.FINAL DX SPEC: NORMAL
PATH REPORT.GROSS SPEC: NORMAL
PATH REPORT.MICROSCOPIC SPEC OTHER STN: NORMAL
PATH REPORT.RELEVANT HX SPEC: NORMAL
PHOTO IMAGE: NORMAL

## 2022-10-14 PROCEDURE — 88304 TISSUE EXAM BY PATHOLOGIST: CPT | Mod: 26 | Performed by: PATHOLOGY

## 2022-12-27 LAB
ABO/RH(D): NORMAL
ANTIBODY SCREEN: NEGATIVE
SPECIMEN EXPIRATION DATE: NORMAL

## 2022-12-27 PROCEDURE — 80053 COMPREHEN METABOLIC PANEL: CPT | Performed by: EMERGENCY MEDICINE

## 2022-12-27 PROCEDURE — 36415 COLL VENOUS BLD VENIPUNCTURE: CPT | Performed by: EMERGENCY MEDICINE

## 2022-12-27 PROCEDURE — 85025 COMPLETE CBC W/AUTO DIFF WBC: CPT | Performed by: EMERGENCY MEDICINE

## 2022-12-27 PROCEDURE — 86901 BLOOD TYPING SEROLOGIC RH(D): CPT | Performed by: EMERGENCY MEDICINE

## 2022-12-27 PROCEDURE — 250N000013 HC RX MED GY IP 250 OP 250 PS 637: Performed by: EMERGENCY MEDICINE

## 2022-12-27 PROCEDURE — 84484 ASSAY OF TROPONIN QUANT: CPT | Performed by: EMERGENCY MEDICINE

## 2022-12-27 PROCEDURE — 99285 EMERGENCY DEPT VISIT HI MDM: CPT | Mod: 25

## 2022-12-27 PROCEDURE — 93005 ELECTROCARDIOGRAM TRACING: CPT

## 2022-12-27 RX ORDER — ASPIRIN 325 MG
325 TABLET ORAL ONCE
Status: COMPLETED | OUTPATIENT
Start: 2022-12-27 | End: 2022-12-27

## 2022-12-27 RX ADMIN — ASPIRIN 325 MG ORAL TABLET 325 MG: 325 PILL ORAL at 23:46

## 2022-12-28 ENCOUNTER — APPOINTMENT (OUTPATIENT)
Dept: GENERAL RADIOLOGY | Facility: CLINIC | Age: 67
End: 2022-12-28
Attending: EMERGENCY MEDICINE
Payer: MEDICARE

## 2022-12-28 ENCOUNTER — TELEPHONE (OUTPATIENT)
Dept: CARDIOLOGY | Facility: CLINIC | Age: 67
End: 2022-12-28
Payer: MEDICARE

## 2022-12-28 ENCOUNTER — HOSPITAL ENCOUNTER (EMERGENCY)
Facility: CLINIC | Age: 67
Discharge: HOME | End: 2022-12-28
Attending: EMERGENCY MEDICINE | Admitting: EMERGENCY MEDICINE
Payer: MEDICARE

## 2022-12-28 VITALS
OXYGEN SATURATION: 93 % | SYSTOLIC BLOOD PRESSURE: 137 MMHG | HEIGHT: 69 IN | HEART RATE: 59 BPM | TEMPERATURE: 97.2 F | BODY MASS INDEX: 24.88 KG/M2 | DIASTOLIC BLOOD PRESSURE: 77 MMHG | WEIGHT: 168 LBS | RESPIRATION RATE: 14 BRPM

## 2022-12-28 DIAGNOSIS — R07.9 NONSPECIFIC CHEST PAIN: ICD-10-CM

## 2022-12-28 LAB
ALBUMIN SERPL-MCNC: 3.5 G/DL (ref 3.4–5)
ALP SERPL-CCNC: 75 U/L (ref 40–150)
ALT SERPL W P-5'-P-CCNC: 35 U/L (ref 0–70)
ANION GAP SERPL CALCULATED.3IONS-SCNC: 6 MMOL/L (ref 3–14)
AST SERPL W P-5'-P-CCNC: 30 U/L (ref 0–45)
ATRIAL RATE - MUSE: 59 BPM
BASOPHILS # BLD AUTO: 0 10E3/UL (ref 0–0.2)
BASOPHILS NFR BLD AUTO: 1 %
BILIRUB SERPL-MCNC: 0.4 MG/DL (ref 0.2–1.3)
BUN SERPL-MCNC: 23 MG/DL (ref 7–30)
CALCIUM SERPL-MCNC: 9.2 MG/DL (ref 8.5–10.1)
CHLORIDE BLD-SCNC: 103 MMOL/L (ref 94–109)
CO2 SERPL-SCNC: 29 MMOL/L (ref 20–32)
CREAT SERPL-MCNC: 1.41 MG/DL (ref 0.66–1.25)
DIASTOLIC BLOOD PRESSURE - MUSE: NORMAL MMHG
EOSINOPHIL # BLD AUTO: 0.2 10E3/UL (ref 0–0.7)
EOSINOPHIL NFR BLD AUTO: 3 %
ERYTHROCYTE [DISTWIDTH] IN BLOOD BY AUTOMATED COUNT: 23.6 % (ref 10–15)
GFR SERPL CREATININE-BSD FRML MDRD: 55 ML/MIN/1.73M2
GLUCOSE BLD-MCNC: 110 MG/DL (ref 70–99)
HCT VFR BLD AUTO: 39.1 % (ref 40–53)
HGB BLD-MCNC: 12.4 G/DL (ref 13.3–17.7)
IMM GRANULOCYTES # BLD: 0 10E3/UL
IMM GRANULOCYTES NFR BLD: 0 %
INTERPRETATION ECG - MUSE: NORMAL
LYMPHOCYTES # BLD AUTO: 2.1 10E3/UL (ref 0.8–5.3)
LYMPHOCYTES NFR BLD AUTO: 37 %
MCH RBC QN AUTO: 25.2 PG (ref 26.5–33)
MCHC RBC AUTO-ENTMCNC: 31.7 G/DL (ref 31.5–36.5)
MCV RBC AUTO: 80 FL (ref 78–100)
MONOCYTES # BLD AUTO: 0.4 10E3/UL (ref 0–1.3)
MONOCYTES NFR BLD AUTO: 7 %
NEUTROPHILS # BLD AUTO: 2.9 10E3/UL (ref 1.6–8.3)
NEUTROPHILS NFR BLD AUTO: 52 %
NRBC # BLD AUTO: 0 10E3/UL
NRBC BLD AUTO-RTO: 0 /100
P AXIS - MUSE: 71 DEGREES
PLATELET # BLD AUTO: 194 10E3/UL (ref 150–450)
POTASSIUM BLD-SCNC: 3.9 MMOL/L (ref 3.4–5.3)
PR INTERVAL - MUSE: 198 MS
PROT SERPL-MCNC: 7.6 G/DL (ref 6.8–8.8)
QRS DURATION - MUSE: 98 MS
QT - MUSE: 408 MS
QTC - MUSE: 403 MS
R AXIS - MUSE: 49 DEGREES
RBC # BLD AUTO: 4.92 10E6/UL (ref 4.4–5.9)
SODIUM SERPL-SCNC: 138 MMOL/L (ref 133–144)
SYSTOLIC BLOOD PRESSURE - MUSE: NORMAL MMHG
T AXIS - MUSE: 54 DEGREES
TROPONIN I SERPL HS-MCNC: 48 NG/L
TROPONIN I SERPL HS-MCNC: 63 NG/L
VENTRICULAR RATE- MUSE: 59 BPM
WBC # BLD AUTO: 5.5 10E3/UL (ref 4–11)

## 2022-12-28 PROCEDURE — 84484 ASSAY OF TROPONIN QUANT: CPT | Performed by: EMERGENCY MEDICINE

## 2022-12-28 PROCEDURE — 36415 COLL VENOUS BLD VENIPUNCTURE: CPT | Performed by: EMERGENCY MEDICINE

## 2022-12-28 PROCEDURE — 71046 X-RAY EXAM CHEST 2 VIEWS: CPT

## 2022-12-28 ASSESSMENT — ACTIVITIES OF DAILY LIVING (ADL)
ADLS_ACUITY_SCORE: 35
ADLS_ACUITY_SCORE: 33

## 2022-12-28 NOTE — TELEPHONE ENCOUNTER
[facility] Call Center    Phone Message    May a detailed message be left on voicemail: no     Reason for Call: Other: Raúl called to schedule an Echo Stress Echocardiogram, please reach out to him at [phone].     Action Taken: Other: [facility]    Travel Screening: Not Applicable

## 2022-12-28 NOTE — ED TRIAGE NOTES
Pt reports 2 months ago admitted for low hemoglobin. Pt having iron transfusions to correct this    Starting Saturday pt started geting pain in chest shoulder and upper back intermittently. Pt reports pain has increased this evening especially in the chest     Pt n/v or sob

## 2022-12-28 NOTE — ED PROVIDER NOTES
History     Chief Complaint:  Chest Pain and Shoulder Pain      HPI   Raúl Marie is a 67 year old male who presents with chest tightness.  He states he was laying in bed tonight when he noted that his chest felt like there was a pressure going across his entire chest originating at his sternum.  He also felt an associated ache that went in to both of his arms.  No sharp or stabbing pain.  Minimal shortness of breath.  The patient states that he recently had significant anemia due to a bleeding hemorrhoid and has been getting iron transfusions.  He was concerned this may be related to his transfusion.  He has not had any fevers.  No cough or cold symptoms.  No numbness or tingling.  No other complaints.    Review of Systems  10 systems reviewed and negative except as above and in HPI.    Allergies:  Chlorpheniramine-Phenylephrine  Walnuts [Nuts]  Wheat Extract      Medications:    latanoprost (XALATAN) 0.005 % ophthalmic solution  psyllium (METAMUCIL/KONSYL) Packet  tamsulosin (FLOMAX) 0.4 MG capsule        Past Medical History:    Past Medical History:   Diagnosis Date     BPH (benign prostatic hyperplasia)      Glaucoma      Hemorrhoids, unspecified hemorrhoid type      IgA nephropathy      Sleep apnea      Patient Active Problem List    Diagnosis Date Noted     Rectal bleeding 10/05/2022     Priority: Medium     Anemia, unspecified type 10/05/2022     Priority: Medium        Past Surgical History:    Past Surgical History:   Procedure Laterality Date     APPENDECTOMY       COLONOSCOPY N/A 10/7/2022    Procedure: COLONOSCOPY, WITH POLYPECTOMY AND BIOPSY;  Surgeon: Sylvie Jj MD;  Location: [facility]     ESOPHAGOSCOPY, GASTROSCOPY, DUODENOSCOPY (EGD), COMBINED N/A 10/7/2022    Procedure: ESOPHAGOGASTRODUODENOSCOPY, WITH BIOPSY;  Surgeon: Sylvie Jj MD;  Location:  GI       Family History:      Family History   Problem Relation Age of Onset     Diabetes Father      Colon Cancer Paternal Grandmother  Pt has tried B6 and Unisom in the past, but it did not help. Pt takes Phenergan twice a day. She will cut back to once to see if that helps. Pt is eating small meals and drinking plenty of water.    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  The patient presents to the ED alone    Physical Exam     Patient Vitals for the past 24 hrs:   BP Temp Temp src Pulse Resp SpO2 Height Weight   12/28/22 0314 -- -- -- 59 14 93 % -- --   12/28/22 0229 137/77 -- -- -- -- -- -- --   12/27/22 2327 (!) 153/81 97.2  F (36.2  C) Oral 64 18 98 % 1.753 m (5' 9\") 76.2 kg (168 lb)       Physical Exam  General: Resting on the gurney, appears comfortable  Head:  The scalp, face, and head appear normal  Mouth/Throat: Mucus membranes are moist  CV:  Regular rate    Normal S1 and S2  No pathological murmur   Resp:  Breath sounds clear and equal bilaterally    Non-labored, no retractions or accessory muscle use    No coarseness    No wheezing   GI:  Abdomen is soft, no rigidity    No tenderness to palpation  MS:  Normal motor assessment of all extremities.    Good capillary refill noted.  Skin:  No rash or lesions noted.  Neuro: Speech is normal and fluent. No apparent deficit.  Psych: Awake. Alert.  Normal affect.      Appropriate interactions.      Emergency Department Course     ECG results from 12/28/22   EKG 12 lead     Value    Systolic Blood Pressure     Diastolic Blood Pressure     Ventricular Rate 59    Atrial Rate 59    MN Interval 198    QRS Duration 98        QTc 403    P Axis 71    R AXIS 49    T Axis 54    Interpretation ECG      Sinus bradycardia  Otherwise normal ECG  When compared with ECG of 13-OCT-2022 15:24,  ST no longer depressed in Anterior leads  Confirmed by GENERATED REPORT, COMPUTER ([id number]),  Saumya Rivera ([id number]) on 12/28/2022 2:07:47 AM         Imaging:  Chest XR,  PA & LAT   Final Result   IMPRESSION: Both lungs remain clear. No adenopathy or effusion. Normal cardiac size and pulmonary vascularity. Degenerative changes thoracic spine with mild right convex curve. No significant interval change.      Echo Stress Echocardiogram    (Results Pending)     Report per " radiology    Laboratory:  Labs Ordered and Resulted from Time of ED Arrival to Time of ED Departure   COMPREHENSIVE METABOLIC PANEL - Abnormal       Result Value    Sodium 138      Potassium 3.9      Chloride 103      Carbon Dioxide (CO2) 29      Anion Gap 6      Urea Nitrogen 23      Creatinine 1.41 (*)     Calcium 9.2      Glucose 110 (*)     Alkaline Phosphatase 75      AST 30      ALT 35      Protein Total 7.6      Albumin 3.5      Bilirubin Total 0.4      GFR Estimate 55 (*)    CBC WITH PLATELETS AND DIFFERENTIAL - Abnormal    WBC Count 5.5      RBC Count 4.92      Hemoglobin 12.4 (*)     Hematocrit 39.1 (*)     MCV 80      MCH 25.2 (*)     MCHC 31.7      RDW 23.6 (*)     Platelet Count 194      % Neutrophils 52      % Lymphocytes 37      % Monocytes 7      % Eosinophils 3      % Basophils 1      % Immature Granulocytes 0      NRBCs per 100 WBC 0      Absolute Neutrophils 2.9      Absolute Lymphocytes 2.1      Absolute Monocytes 0.4      Absolute Eosinophils 0.2      Absolute Basophils 0.0      Absolute Immature Granulocytes 0.0      Absolute NRBCs 0.0     TROPONIN I - Normal    Troponin I High Sensitivity 48     TROPONIN I   TYPE AND SCREEN, ADULT    ABO/RH(D) O POS      Antibody Screen Negative      SPECIMEN EXPIRATION DATE [id number]     ABO/RH TYPE AND SCREEN     Emergency Department Course:    Reviewed:  I reviewed nursing notes, vitals and past medical history    Assessments:   I obtained history and examined the patient as noted above.    I rechecked the patient and explained findings.     Interventions:  Medications   aspirin (ASA) tablet 325 mg (325 mg Oral Given 12/27/22 2343)       Disposition:  The patient was discharged to home.     Impression & Plan        Medical Decision Making:  Raúl Marie is a 67 year old male who presents for evaluation of chest pain. Initial laboratory and imaging tests have come back normal. There is no clinical, laboratory, or radiographic evidence of pulmonary  embolism, aortic dissection, pneumonia, pneumothorax or cardiac ischemia.  Other etiologies of chest pain considered in this patient included chest wall source, esophageal spasm or GI source, pleuritis, referred pain, etc.  My suspicion of unstable angina at this point is very low and given risk/benefit ratio would not start lovenox or heparin. Given the nature and timing of the patient's symptoms, I am comfortable discharging the patient with repeat troponi.  The possibility of cardiac etiology was discussed and the patient is aware of the imprtance of following up with stress test.  This was ordered from the ED and will be performed within 48 hours. The patient is comfortable with this plan and eager for discharge to home.  All questions answered and return precautions given.         HEART Score  Background  Calculates the overall risk of adverse event in patient's presenting with chest pain.  Based on 5 criteria (each assigned 0-2 points) including suspiciousness of history, EKG, age, risk factors and troponin.    Data  67 year old male  has Rectal bleeding and Anemia, unspecified type on their problem list.   reports that he has never smoked. He has never used smokeless tobacco.  family history includes Colon Cancer in his paternal grandmother; Diabetes in his father.  Lab Results   Component Value Date    TROPI <0.012 01/11/2011     Criteria   0-2 points for each of 5 items (maximum of 10 points):  Score 1- History moderately suspicious for coronary syndrome  Score 0- EKG without repolarization disturbance  Score 2- Age 65 years or older  Score 0- No risk factors for atherosclerotic disease  Score 0- Within normal limits for troponin levels  Interpretation  Risk of adverse outcome  Heart Score: 3    Total Score 0-3- Adverse Outcome Risk 2.5% - Supports early discharge with appropriate follow-up    Covid-19  Raúl Marie was evaluated during a global COVID-19 pandemic, which necessitated consideration that  the patient might be at risk for infection with the SARS-CoV-2 virus that causes COVID-19.   Applicable protocols for evaluation were followed during the patient's care.   COVID-19 was considered as part of the patient's evaluation.    Diagnosis:    ICD-10-CM    1. Nonspecific chest pain  R07.9 Echo Stress Echocardiogram             Michelle De La Fuente MD  12/28/22 0622

## 2022-12-29 ENCOUNTER — HOSPITAL ENCOUNTER (OUTPATIENT)
Dept: CARDIOLOGY | Facility: CLINIC | Age: 67
Discharge: HOME | End: 2022-12-29
Attending: EMERGENCY MEDICINE | Admitting: EMERGENCY MEDICINE
Payer: MEDICARE

## 2022-12-29 DIAGNOSIS — R07.9 NONSPECIFIC CHEST PAIN: ICD-10-CM

## 2022-12-29 PROCEDURE — 93321 DOPPLER ECHO F-UP/LMTD STD: CPT | Mod: 26 | Performed by: INTERNAL MEDICINE

## 2022-12-29 PROCEDURE — 93350 STRESS TTE ONLY: CPT | Mod: 26 | Performed by: INTERNAL MEDICINE

## 2022-12-29 PROCEDURE — 93016 CV STRESS TEST SUPVJ ONLY: CPT | Performed by: INTERNAL MEDICINE

## 2022-12-29 PROCEDURE — 93325 DOPPLER ECHO COLOR FLOW MAPG: CPT | Mod: 26 | Performed by: INTERNAL MEDICINE

## 2022-12-29 PROCEDURE — 93018 CV STRESS TEST I&R ONLY: CPT | Performed by: INTERNAL MEDICINE

## 2022-12-29 PROCEDURE — 93350 STRESS TTE ONLY: CPT | Mod: TC

## 2022-12-29 PROCEDURE — 93017 CV STRESS TEST TRACING ONLY: CPT

## 2023-01-03 NOTE — ED PROVIDER NOTES
I took a call from outside clinic today where patient presented for chest pain reporting abnormal stress echo.  In review of his chart after his ED visit 5 days ago, he did have inducible changes on his stress echo along with inducible chest pain.  EKG at outside clinic is reportedly reassuring, they will send by private car for further evaluation.    Jimmy Real MD  [employer]  12:59 PM 01/03/23        Jimmy Real MD  01/03/23 9922

## 2023-01-04 ENCOUNTER — APPOINTMENT (OUTPATIENT)
Dept: CT IMAGING | Facility: CLINIC | Age: 68
DRG: 280 | End: 2023-01-04
Attending: NURSE PRACTITIONER
Payer: MEDICARE

## 2023-01-04 ENCOUNTER — APPOINTMENT (OUTPATIENT)
Dept: GENERAL RADIOLOGY | Facility: CLINIC | Age: 68
DRG: 280 | End: 2023-01-04
Attending: EMERGENCY MEDICINE
Payer: MEDICARE

## 2023-01-04 ENCOUNTER — NURSE TRIAGE (OUTPATIENT)
Dept: NURSING | Facility: CLINIC | Age: 68
End: 2023-01-04

## 2023-01-04 ENCOUNTER — HOSPITAL ENCOUNTER (INPATIENT)
Facility: CLINIC | Age: 68
LOS: 3 days | Discharge: HOME | DRG: 280 | End: 2023-01-07
Attending: EMERGENCY MEDICINE | Admitting: INTERNAL MEDICINE
Payer: MEDICARE

## 2023-01-04 DIAGNOSIS — I25.42 SPONTANEOUS DISSECTION OF CORONARY ARTERY: ICD-10-CM

## 2023-01-04 DIAGNOSIS — I21.4 NON-STEMI (NON-ST ELEVATED MYOCARDIAL INFARCTION) (H): ICD-10-CM

## 2023-01-04 DIAGNOSIS — I77.3 FIBROMUSCULAR DYSPLASIA OF CAROTID ARTERY (H): Primary | ICD-10-CM

## 2023-01-04 LAB
ACT BLD: 190 SECONDS (ref 74–150)
ALBUMIN SERPL-MCNC: 3.4 G/DL (ref 3.4–5)
ALP SERPL-CCNC: 66 U/L (ref 40–150)
ALT SERPL W P-5'-P-CCNC: 31 U/L (ref 0–70)
ANION GAP SERPL CALCULATED.3IONS-SCNC: 7 MMOL/L (ref 3–14)
AST SERPL W P-5'-P-CCNC: 27 U/L (ref 0–45)
ATRIAL RATE - MUSE: 69 BPM
BASOPHILS # BLD AUTO: 0 10E3/UL (ref 0–0.2)
BASOPHILS NFR BLD AUTO: 1 %
BILIRUB SERPL-MCNC: 0.4 MG/DL (ref 0.2–1.3)
BUN SERPL-MCNC: 25 MG/DL (ref 7–30)
CALCIUM SERPL-MCNC: 9 MG/DL (ref 8.5–10.1)
CHLORIDE BLD-SCNC: 105 MMOL/L (ref 94–109)
CHOLEST SERPL-MCNC: 164 MG/DL
CO2 SERPL-SCNC: 27 MMOL/L (ref 20–32)
CREAT SERPL-MCNC: 1.09 MG/DL (ref 0.66–1.25)
DIASTOLIC BLOOD PRESSURE - MUSE: NORMAL MMHG
EOSINOPHIL # BLD AUTO: 0.1 10E3/UL (ref 0–0.7)
EOSINOPHIL NFR BLD AUTO: 2 %
ERYTHROCYTE [DISTWIDTH] IN BLOOD BY AUTOMATED COUNT: 23.2 % (ref 10–15)
ERYTHROCYTE [DISTWIDTH] IN BLOOD BY AUTOMATED COUNT: 23.4 % (ref 10–15)
GFR SERPL CREATININE-BSD FRML MDRD: 74 ML/MIN/1.73M2
GLUCOSE BLD-MCNC: 99 MG/DL (ref 70–99)
GLUCOSE BLDC GLUCOMTR-MCNC: 84 MG/DL (ref 70–99)
HBA1C MFR BLD: 4.9 % (ref 0–5.6)
HCT VFR BLD AUTO: 35.4 % (ref 40–53)
HCT VFR BLD AUTO: 39.2 % (ref 40–53)
HDLC SERPL-MCNC: 47 MG/DL
HGB BLD-MCNC: 11.6 G/DL (ref 13.3–17.7)
HGB BLD-MCNC: 12.6 G/DL (ref 13.3–17.7)
HOLD SPECIMEN: 0
HOLD SPECIMEN: 0
HOLD SPECIMEN: NORMAL
HOLD SPECIMEN: NORMAL
IMM GRANULOCYTES # BLD: 0 10E3/UL
IMM GRANULOCYTES NFR BLD: 0 %
INTERPRETATION ECG - MUSE: NORMAL
LDLC SERPL CALC-MCNC: 105 MG/DL
LYMPHOCYTES # BLD AUTO: 1.3 10E3/UL (ref 0.8–5.3)
LYMPHOCYTES NFR BLD AUTO: 24 %
MCH RBC QN AUTO: 25.7 PG (ref 26.5–33)
MCH RBC QN AUTO: 25.9 PG (ref 26.5–33)
MCHC RBC AUTO-ENTMCNC: 32.1 G/DL (ref 31.5–36.5)
MCHC RBC AUTO-ENTMCNC: 32.8 G/DL (ref 31.5–36.5)
MCV RBC AUTO: 79 FL (ref 78–100)
MCV RBC AUTO: 81 FL (ref 78–100)
MONOCYTES # BLD AUTO: 0.3 10E3/UL (ref 0–1.3)
MONOCYTES NFR BLD AUTO: 6 %
NEUTROPHILS # BLD AUTO: 3.6 10E3/UL (ref 1.6–8.3)
NEUTROPHILS NFR BLD AUTO: 67 %
NONHDLC SERPL-MCNC: 117 MG/DL
NRBC # BLD AUTO: 0 10E3/UL
NRBC BLD AUTO-RTO: 0 /100
P AXIS - MUSE: 80 DEGREES
PLATELET # BLD AUTO: 188 10E3/UL (ref 150–450)
PLATELET # BLD AUTO: 201 10E3/UL (ref 150–450)
POTASSIUM BLD-SCNC: 4 MMOL/L (ref 3.4–5.3)
PR INTERVAL - MUSE: 186 MS
PROT SERPL-MCNC: 7.6 G/DL (ref 6.8–8.8)
QRS DURATION - MUSE: 94 MS
QT - MUSE: 382 MS
QTC - MUSE: 409 MS
R AXIS - MUSE: 88 DEGREES
RBC # BLD AUTO: 4.51 10E6/UL (ref 4.4–5.9)
RBC # BLD AUTO: 4.86 10E6/UL (ref 4.4–5.9)
SARS-COV-2 RNA RESP QL NAA+PROBE: NEGATIVE
SODIUM SERPL-SCNC: 139 MMOL/L (ref 133–144)
SYSTOLIC BLOOD PRESSURE - MUSE: NORMAL MMHG
T AXIS - MUSE: 80 DEGREES
TRIGL SERPL-MCNC: 61 MG/DL
TROPONIN I SERPL HS-MCNC: 1059 NG/L
TROPONIN I SERPL HS-MCNC: 1212 NG/L
UFH PPP CHRO-ACNC: <0.1 IU/ML
VENTRICULAR RATE- MUSE: 69 BPM
WBC # BLD AUTO: 5.4 10E3/UL (ref 4–11)
WBC # BLD AUTO: 5.9 10E3/UL (ref 4–11)

## 2023-01-04 PROCEDURE — 70498 CT ANGIOGRAPHY NECK: CPT | Mod: MG

## 2023-01-04 PROCEDURE — 250N000009 HC RX 250

## 2023-01-04 PROCEDURE — 84484 ASSAY OF TROPONIN QUANT: CPT | Performed by: EMERGENCY MEDICINE

## 2023-01-04 PROCEDURE — U0003 INFECTIOUS AGENT DETECTION BY NUCLEIC ACID (DNA OR RNA); SEVERE ACUTE RESPIRATORY SYNDROME CORONAVIRUS 2 (SARS-COV-2) (CORONAVIRUS DISEASE [COVID-19]), AMPLIFIED PROBE TECHNIQUE, MAKING USE OF HIGH THROUGHPUT TECHNOLOGIES AS DESCRIBED BY CMS-2020-01-R: HCPCS | Performed by: INTERNAL MEDICINE

## 2023-01-04 PROCEDURE — 99285 EMERGENCY DEPT VISIT HI MDM: CPT | Mod: 25

## 2023-01-04 PROCEDURE — 71046 X-RAY EXAM CHEST 2 VIEWS: CPT

## 2023-01-04 PROCEDURE — 250N000011 HC RX IP 250 OP 636: Performed by: NURSE PRACTITIONER

## 2023-01-04 PROCEDURE — 70496 CT ANGIOGRAPHY HEAD: CPT | Mod: MG

## 2023-01-04 PROCEDURE — C1887 CATHETER, GUIDING: HCPCS | Performed by: INTERNAL MEDICINE

## 2023-01-04 PROCEDURE — 93454 CORONARY ARTERY ANGIO S&I: CPT | Mod: 26 | Performed by: INTERNAL MEDICINE

## 2023-01-04 PROCEDURE — 99291 CRITICAL CARE FIRST HOUR: CPT | Performed by: NURSE PRACTITIONER

## 2023-01-04 PROCEDURE — 96375 TX/PRO/DX INJ NEW DRUG ADDON: CPT

## 2023-01-04 PROCEDURE — 96376 TX/PRO/DX INJ SAME DRUG ADON: CPT

## 2023-01-04 PROCEDURE — 36415 COLL VENOUS BLD VENIPUNCTURE: CPT | Performed by: EMERGENCY MEDICINE

## 2023-01-04 PROCEDURE — 83036 HEMOGLOBIN GLYCOSYLATED A1C: CPT | Performed by: INTERNAL MEDICINE

## 2023-01-04 PROCEDURE — 250N000011 HC RX IP 250 OP 636: Performed by: EMERGENCY MEDICINE

## 2023-01-04 PROCEDURE — B2111ZZ FLUOROSCOPY OF MULTIPLE CORONARY ARTERIES USING LOW OSMOLAR CONTRAST: ICD-10-PCS | Performed by: INTERNAL MEDICINE

## 2023-01-04 PROCEDURE — 93454 CORONARY ARTERY ANGIO S&I: CPT

## 2023-01-04 PROCEDURE — C9803 HOPD COVID-19 SPEC COLLECT: HCPCS

## 2023-01-04 PROCEDURE — 99152 MOD SED SAME PHYS/QHP 5/>YRS: CPT

## 2023-01-04 PROCEDURE — 250N000009 HC RX 250: Performed by: NURSE PRACTITIONER

## 2023-01-04 PROCEDURE — 85027 COMPLETE CBC AUTOMATED: CPT | Performed by: INTERNAL MEDICINE

## 2023-01-04 PROCEDURE — 250N000013 HC RX MED GY IP 250 OP 250 PS 637: Performed by: INTERNAL MEDICINE

## 2023-01-04 PROCEDURE — 85025 COMPLETE CBC W/AUTO DIFF WBC: CPT | Performed by: EMERGENCY MEDICINE

## 2023-01-04 PROCEDURE — 85347 COAGULATION TIME ACTIVATED: CPT

## 2023-01-04 PROCEDURE — 272N000001 HC OR GENERAL SUPPLY STERILE: Performed by: INTERNAL MEDICINE

## 2023-01-04 PROCEDURE — 99291 CRITICAL CARE FIRST HOUR: CPT | Mod: 25 | Performed by: PHYSICIAN ASSISTANT

## 2023-01-04 PROCEDURE — C1894 INTRO/SHEATH, NON-LASER: HCPCS | Performed by: INTERNAL MEDICINE

## 2023-01-04 PROCEDURE — 84484 ASSAY OF TROPONIN QUANT: CPT | Performed by: INTERNAL MEDICINE

## 2023-01-04 PROCEDURE — 36415 COLL VENOUS BLD VENIPUNCTURE: CPT | Performed by: INTERNAL MEDICINE

## 2023-01-04 PROCEDURE — 210N000002 HC R&B HEART CARE

## 2023-01-04 PROCEDURE — 80061 LIPID PANEL: CPT | Performed by: INTERNAL MEDICINE

## 2023-01-04 PROCEDURE — 250N000011 HC RX IP 250 OP 636

## 2023-01-04 PROCEDURE — 250N000013 HC RX MED GY IP 250 OP 250 PS 637: Performed by: STUDENT IN AN ORGANIZED HEALTH CARE EDUCATION/TRAINING PROGRAM

## 2023-01-04 PROCEDURE — 250N000013 HC RX MED GY IP 250 OP 250 PS 637: Performed by: EMERGENCY MEDICINE

## 2023-01-04 PROCEDURE — 99152 MOD SED SAME PHYS/QHP 5/>YRS: CPT | Mod: GC | Performed by: INTERNAL MEDICINE

## 2023-01-04 PROCEDURE — 96365 THER/PROPH/DIAG IV INF INIT: CPT

## 2023-01-04 PROCEDURE — 250N000013 HC RX MED GY IP 250 OP 250 PS 637: Performed by: PHYSICIAN ASSISTANT

## 2023-01-04 PROCEDURE — 85520 HEPARIN ASSAY: CPT | Performed by: INTERNAL MEDICINE

## 2023-01-04 PROCEDURE — 80053 COMPREHEN METABOLIC PANEL: CPT | Performed by: EMERGENCY MEDICINE

## 2023-01-04 PROCEDURE — 70450 CT HEAD/BRAIN W/O DYE: CPT | Mod: MG

## 2023-01-04 PROCEDURE — 93005 ELECTROCARDIOGRAM TRACING: CPT

## 2023-01-04 PROCEDURE — 93010 ELECTROCARDIOGRAM REPORT: CPT | Performed by: INTERNAL MEDICINE

## 2023-01-04 PROCEDURE — 250N000013 HC RX MED GY IP 250 OP 250 PS 637

## 2023-01-04 PROCEDURE — 250N000011 HC RX IP 250 OP 636: Performed by: PHYSICIAN ASSISTANT

## 2023-01-04 PROCEDURE — 210N000001 HC R&B IMCU HEART CARE

## 2023-01-04 RX ORDER — VERAPAMIL HYDROCHLORIDE 2.5 MG/ML
INJECTION, SOLUTION INTRAVENOUS
Status: DISCONTINUED | OUTPATIENT
Start: 2023-01-04 | End: 2023-01-04 | Stop reason: HOSPADM

## 2023-01-04 RX ORDER — HEPARIN SODIUM 10000 [USP'U]/100ML
0-5000 INJECTION, SOLUTION INTRAVENOUS CONTINUOUS
Status: DISCONTINUED | OUTPATIENT
Start: 2023-01-04 | End: 2023-01-05

## 2023-01-04 RX ORDER — ATORVASTATIN CALCIUM 40 MG/1
40 TABLET, FILM COATED ORAL DAILY
Status: DISCONTINUED | OUTPATIENT
Start: 2023-01-04 | End: 2023-01-07 | Stop reason: HOSPADM

## 2023-01-04 RX ORDER — SODIUM CHLORIDE 9 MG/ML
INJECTION, SOLUTION INTRAVENOUS CONTINUOUS
Status: CANCELLED | OUTPATIENT
Start: 2023-01-04

## 2023-01-04 RX ORDER — NALOXONE HYDROCHLORIDE 0.4 MG/ML
0.2 INJECTION, SOLUTION INTRAMUSCULAR; INTRAVENOUS; SUBCUTANEOUS
Status: ACTIVE | OUTPATIENT
Start: 2023-01-04 | End: 2023-01-05

## 2023-01-04 RX ORDER — POTASSIUM CHLORIDE 1500 MG/1
20 TABLET, EXTENDED RELEASE ORAL
Status: CANCELLED | OUTPATIENT
Start: 2023-01-04

## 2023-01-04 RX ORDER — IOPAMIDOL 755 MG/ML
75 INJECTION, SOLUTION INTRAVASCULAR ONCE
Status: COMPLETED | OUTPATIENT
Start: 2023-01-04 | End: 2023-01-04

## 2023-01-04 RX ORDER — SODIUM CHLORIDE 9 MG/ML
INJECTION, SOLUTION INTRAVENOUS CONTINUOUS
Status: ACTIVE | OUTPATIENT
Start: 2023-01-04 | End: 2023-01-04

## 2023-01-04 RX ORDER — MORPHINE SULFATE 2 MG/ML
1 INJECTION, SOLUTION INTRAMUSCULAR; INTRAVENOUS
Status: DISCONTINUED | OUTPATIENT
Start: 2023-01-04 | End: 2023-01-07 | Stop reason: HOSPADM

## 2023-01-04 RX ORDER — ASPIRIN 81 MG/1
81 TABLET, CHEWABLE ORAL ONCE
Status: DISCONTINUED | OUTPATIENT
Start: 2023-01-04 | End: 2023-01-04

## 2023-01-04 RX ORDER — FLUMAZENIL 0.1 MG/ML
0.2 INJECTION, SOLUTION INTRAVENOUS
Status: ACTIVE | OUTPATIENT
Start: 2023-01-04 | End: 2023-01-05

## 2023-01-04 RX ORDER — NALOXONE HYDROCHLORIDE 0.4 MG/ML
0.4 INJECTION, SOLUTION INTRAMUSCULAR; INTRAVENOUS; SUBCUTANEOUS
Status: ACTIVE | OUTPATIENT
Start: 2023-01-04 | End: 2023-01-05

## 2023-01-04 RX ORDER — CLOPIDOGREL BISULFATE 75 MG/1
TABLET ORAL
Status: DISCONTINUED | OUTPATIENT
Start: 2023-01-04 | End: 2023-01-04 | Stop reason: HOSPADM

## 2023-01-04 RX ORDER — SODIUM CHLORIDE AND POTASSIUM CHLORIDE 150; 900 MG/100ML; MG/100ML
INJECTION, SOLUTION INTRAVENOUS CONTINUOUS
Status: DISCONTINUED | OUTPATIENT
Start: 2023-01-04 | End: 2023-01-04

## 2023-01-04 RX ORDER — LORAZEPAM 2 MG/ML
0.5 INJECTION INTRAMUSCULAR
Status: CANCELLED | OUTPATIENT
Start: 2023-01-04

## 2023-01-04 RX ORDER — ASPIRIN 325 MG
325 TABLET ORAL ONCE
Status: CANCELLED | OUTPATIENT
Start: 2023-01-04 | End: 2023-01-04

## 2023-01-04 RX ORDER — ASPIRIN 81 MG/1
243 TABLET, CHEWABLE ORAL ONCE
Status: CANCELLED | OUTPATIENT
Start: 2023-01-04

## 2023-01-04 RX ORDER — ATROPINE SULFATE 0.1 MG/ML
0.5 INJECTION INTRAVENOUS
Status: ACTIVE | OUTPATIENT
Start: 2023-01-04 | End: 2023-01-05

## 2023-01-04 RX ORDER — NITROGLYCERIN 0.4 MG/1
0.4 TABLET SUBLINGUAL EVERY 5 MIN PRN
Status: COMPLETED | OUTPATIENT
Start: 2023-01-04 | End: 2023-01-04

## 2023-01-04 RX ORDER — TAMSULOSIN HYDROCHLORIDE 0.4 MG/1
0.4 CAPSULE ORAL DAILY
Status: DISCONTINUED | OUTPATIENT
Start: 2023-01-04 | End: 2023-01-07 | Stop reason: HOSPADM

## 2023-01-04 RX ORDER — ACETAMINOPHEN 325 MG/1
650 TABLET ORAL EVERY 4 HOURS PRN
Status: DISCONTINUED | OUTPATIENT
Start: 2023-01-04 | End: 2023-01-07 | Stop reason: HOSPADM

## 2023-01-04 RX ORDER — HEPARIN SODIUM 10000 [USP'U]/100ML
0-5000 INJECTION, SOLUTION INTRAVENOUS CONTINUOUS
Status: DISCONTINUED | OUTPATIENT
Start: 2023-01-04 | End: 2023-01-04

## 2023-01-04 RX ORDER — OXYCODONE HYDROCHLORIDE 5 MG/1
10 TABLET ORAL EVERY 4 HOURS PRN
Status: DISCONTINUED | OUTPATIENT
Start: 2023-01-04 | End: 2023-01-07 | Stop reason: HOSPADM

## 2023-01-04 RX ORDER — CLOPIDOGREL BISULFATE 75 MG/1
75 TABLET ORAL DAILY
Status: DISCONTINUED | OUTPATIENT
Start: 2023-01-05 | End: 2023-01-07 | Stop reason: HOSPADM

## 2023-01-04 RX ORDER — METOPROLOL TARTRATE 25 MG/1
25 TABLET, FILM COATED ORAL 2 TIMES DAILY
Status: DISCONTINUED | OUTPATIENT
Start: 2023-01-04 | End: 2023-01-04

## 2023-01-04 RX ORDER — ASPIRIN 81 MG/1
324 TABLET, CHEWABLE ORAL ONCE
Status: COMPLETED | OUTPATIENT
Start: 2023-01-04 | End: 2023-01-04

## 2023-01-04 RX ORDER — LATANOPROST 50 UG/ML
1 SOLUTION/ DROPS OPHTHALMIC EVERY EVENING
Status: DISCONTINUED | OUTPATIENT
Start: 2023-01-04 | End: 2023-01-05

## 2023-01-04 RX ORDER — ASPIRIN 81 MG/1
81 TABLET, CHEWABLE ORAL DAILY
Status: DISCONTINUED | OUTPATIENT
Start: 2023-01-05 | End: 2023-01-04

## 2023-01-04 RX ORDER — FENTANYL CITRATE 50 UG/ML
INJECTION, SOLUTION INTRAMUSCULAR; INTRAVENOUS
Status: DISCONTINUED | OUTPATIENT
Start: 2023-01-04 | End: 2023-01-04 | Stop reason: HOSPADM

## 2023-01-04 RX ORDER — METOPROLOL TARTRATE 1 MG/ML
5 INJECTION, SOLUTION INTRAVENOUS
Status: DISCONTINUED | OUTPATIENT
Start: 2023-01-04 | End: 2023-01-07 | Stop reason: HOSPADM

## 2023-01-04 RX ORDER — ONDANSETRON 2 MG/ML
4 INJECTION INTRAMUSCULAR; INTRAVENOUS EVERY 6 HOURS PRN
Status: DISCONTINUED | OUTPATIENT
Start: 2023-01-04 | End: 2023-01-07 | Stop reason: HOSPADM

## 2023-01-04 RX ORDER — METOPROLOL TARTRATE 25 MG/1
25 TABLET, FILM COATED ORAL 2 TIMES DAILY
Status: DISCONTINUED | OUTPATIENT
Start: 2023-01-04 | End: 2023-01-07 | Stop reason: HOSPADM

## 2023-01-04 RX ORDER — ASPIRIN 81 MG/1
81 TABLET ORAL DAILY
Status: DISCONTINUED | OUTPATIENT
Start: 2023-01-05 | End: 2023-01-07 | Stop reason: HOSPADM

## 2023-01-04 RX ORDER — FENTANYL CITRATE 50 UG/ML
25 INJECTION, SOLUTION INTRAMUSCULAR; INTRAVENOUS
Status: DISCONTINUED | OUTPATIENT
Start: 2023-01-04 | End: 2023-01-07 | Stop reason: HOSPADM

## 2023-01-04 RX ORDER — NITROGLYCERIN 0.4 MG/1
0.4 TABLET SUBLINGUAL EVERY 5 MIN PRN
Status: DISCONTINUED | OUTPATIENT
Start: 2023-01-04 | End: 2023-01-04

## 2023-01-04 RX ORDER — OXYCODONE HYDROCHLORIDE 5 MG/1
5 TABLET ORAL EVERY 4 HOURS PRN
Status: DISCONTINUED | OUTPATIENT
Start: 2023-01-04 | End: 2023-01-07 | Stop reason: HOSPADM

## 2023-01-04 RX ORDER — ASPIRIN 81 MG/1
324 TABLET, CHEWABLE ORAL ONCE
Status: DISCONTINUED | OUTPATIENT
Start: 2023-01-04 | End: 2023-01-04

## 2023-01-04 RX ORDER — LIDOCAINE 40 MG/G
CREAM TOPICAL
Status: CANCELLED | OUTPATIENT
Start: 2023-01-04

## 2023-01-04 RX ORDER — LORAZEPAM 0.5 MG/1
0.5 TABLET ORAL
Status: CANCELLED | OUTPATIENT
Start: 2023-01-04

## 2023-01-04 RX ORDER — ASPIRIN 81 MG/1
81 TABLET, CHEWABLE ORAL DAILY
Qty: 30 TABLET | Refills: 3 | Status: SHIPPED | OUTPATIENT
Start: 2023-01-05 | End: 2023-01-07

## 2023-01-04 RX ORDER — LIDOCAINE 40 MG/G
CREAM TOPICAL
Status: DISCONTINUED | OUTPATIENT
Start: 2023-01-04 | End: 2023-01-06

## 2023-01-04 RX ORDER — NITROGLYCERIN 5 MG/ML
VIAL (ML) INTRAVENOUS
Status: DISCONTINUED | OUTPATIENT
Start: 2023-01-04 | End: 2023-01-04 | Stop reason: HOSPADM

## 2023-01-04 RX ORDER — ONDANSETRON 4 MG/1
4 TABLET, ORALLY DISINTEGRATING ORAL EVERY 6 HOURS PRN
Status: DISCONTINUED | OUTPATIENT
Start: 2023-01-04 | End: 2023-01-07 | Stop reason: HOSPADM

## 2023-01-04 RX ORDER — HYDRALAZINE HYDROCHLORIDE 20 MG/ML
10 INJECTION INTRAMUSCULAR; INTRAVENOUS EVERY 4 HOURS PRN
Status: DISCONTINUED | OUTPATIENT
Start: 2023-01-04 | End: 2023-01-07 | Stop reason: HOSPADM

## 2023-01-04 RX ORDER — NITROGLYCERIN 0.4 MG/1
0.4 TABLET SUBLINGUAL EVERY 5 MIN PRN
Status: DISCONTINUED | OUTPATIENT
Start: 2023-01-04 | End: 2023-01-07 | Stop reason: HOSPADM

## 2023-01-04 RX ADMIN — TAMSULOSIN HYDROCHLORIDE 0.4 MG: 0.4 CAPSULE ORAL at 20:40

## 2023-01-04 RX ADMIN — NITROGLYCERIN 10 MCG/MIN: 20 INJECTION INTRAVENOUS at 17:10

## 2023-01-04 RX ADMIN — SODIUM CHLORIDE 90 ML: 900 INJECTION INTRAVENOUS at 17:32

## 2023-01-04 RX ADMIN — HEPARIN SODIUM 850 UNITS/HR: 10000 INJECTION, SOLUTION INTRAVENOUS at 14:00

## 2023-01-04 RX ADMIN — NITROGLYCERIN 20 MCG/MIN: 20 INJECTION INTRAVENOUS at 18:01

## 2023-01-04 RX ADMIN — NITROGLYCERIN 10 MCG/MIN: 20 INJECTION INTRAVENOUS at 14:58

## 2023-01-04 RX ADMIN — METOPROLOL TARTRATE 25 MG: 25 TABLET, FILM COATED ORAL at 20:41

## 2023-01-04 RX ADMIN — IOPAMIDOL 75 ML: 755 INJECTION, SOLUTION INTRAVENOUS at 17:32

## 2023-01-04 RX ADMIN — ATORVASTATIN CALCIUM 40 MG: 40 TABLET, FILM COATED ORAL at 20:40

## 2023-01-04 RX ADMIN — NITROGLYCERIN 0.4 MG: 0.4 TABLET SUBLINGUAL at 14:08

## 2023-01-04 RX ADMIN — ASPIRIN 81 MG CHEWABLE TABLET 324 MG: 81 TABLET CHEWABLE at 13:24

## 2023-01-04 RX ADMIN — NITROGLYCERIN 0.4 MG: 0.4 TABLET SUBLINGUAL at 14:25

## 2023-01-04 RX ADMIN — NITROGLYCERIN 0.4 MG: 0.4 TABLET SUBLINGUAL at 14:19

## 2023-01-04 ASSESSMENT — VISUAL ACUITY
OU: OTHER (SEE COMMENTS)

## 2023-01-04 ASSESSMENT — ACTIVITIES OF DAILY LIVING (ADL)
ADLS_ACUITY_SCORE: 35
ADLS_ACUITY_SCORE: 33
ADLS_ACUITY_SCORE: 35

## 2023-01-04 NOTE — CONSULTS
[facility]    Cardiology Consultation     Date of Admission:  1/4/2023    Assessment & Plan   Raúl Marie is a 67 year old male who was admitted on 1/4/2023.    1.  NSTEMI, patient with abnormal stress test with EKG changes on 12/29.  Initially patient was told results were reassuring and then earlier this week he was told the results were abnormal and recommended to come in.  Unfortunately he got stuck in a snow bank yesterday while trying to command and then took a bus here today.  He had to take the bus to [facility] and developed chest pain multiple times walking over here.  This was chest pain in about a half a block and resolved with rest.  Troponins are positive at 1,200 here and pt has ongoing pain. Our recommendation is coronary angiogram.  Risks and benefits of coronary angiogram discussed today including, bleeding, bruising, infection, allergic reaction, kidney damage (including need for dialysis), stroke, heart attack, vascular damage, emergency open heart surgery, up to and including death.  Patient indicates understanding and is agreeable to proceed.      2.  Recent severe GI bleed requiring 4 units of blood transfusion now status post hemorrhoidectomy.  The patient has gotten multiple IV iron infusions with normalization of his anemia.      3.  Dyslipidemia, , hdl 47, tot chol 164- will start statin here.      4.  IgA nephropathy, with baseline creatinine about 1.1-1.2.  In his normal range at 1.1 today.    Thank you for this consultation, final recommendations are pending Dr. Muniz documentation.    Moderate complexity     Trena Addison PA-C    Primary Care Physician   Meenu Gusman    Reason for Consult   Reason for consult: I was asked by Dr. Bradshaw to evaluate this patient for NSTEMI.    History of Present Illness   Raúl Marie is a 67 year old male who presents with abnormal stress test and cp.  The patient had initially presented to the ER  12/12/2027 with complaints of chest pain and was ruled out without significant EKG changes or troponins.  He was set up for stress echocardiogram which he completed on 12/29.  This showed EKG changes with ST depression in V3 and V4 but no wall motion abnormality.  He was initially told by his primary on Friday and that this looked okay and was less likely that he had blockage.  He was then seen by a different primary care provider this week was told it was abnormal.  He was directed to the ER especially since he had ongoing chest pain and while trying to get here yesterday he ended up in a snow bank.  He did not do any shoveling.  As such, he went home and took the bus to the ER today.  Since snow was so bad today the bus routes were off and he had to take the bus to [facility].  Walking from [facility] to here he can walk about half a block before developing chest pain, had to rest and then could restart again.  He was pain-free for some time but now that were discussing things he is developed more chest pain this is about 4 out of 10 initially increases to a 5 out of 10 with additional discussion is not improving with nitroglycerin.  His pain is associated with shortness of breath and resolves consistently at rest.    He has had recent hemorrhoidectomy and notes that his hemoglobin was improving over the last several months.  His chest pain developed the day after his last iron infusion so he is concerned that may be contributing.    Past Medical History   Past Medical History:   Diagnosis Date     BPH (benign prostatic hyperplasia)      Glaucoma      Hemorrhoids, unspecified hemorrhoid type      IgA nephropathy      Sleep apnea        Past Surgical History   Past Surgical History:   Procedure Laterality Date     APPENDECTOMY       COLONOSCOPY N/A 10/7/2022    Procedure: COLONOSCOPY, WITH POLYPECTOMY AND BIOPSY;  Surgeon: Sylvie Jj MD;  Location:  GI     ESOPHAGOSCOPY, GASTROSCOPY, DUODENOSCOPY (EGD),  COMBINED N/A 10/7/2022    Procedure: ESOPHAGOGASTRODUODENOSCOPY, WITH BIOPSY;  Surgeon: Sylvie Jj MD;  Location:  GI         Prior to Admission Medications   Prior to Admission Medications   Prescriptions Last Dose Informant Patient Reported? Taking?   latanoprost (XALATAN) 0.005 % ophthalmic solution   Yes No   Sig: Place 1 drop into both eyes every evening   psyllium (METAMUCIL/KONSYL) Packet   No No   Sig: Take 1 packet by mouth daily   tamsulosin (FLOMAX) 0.4 MG capsule   Yes No   Sig: Take 0.4 mg by mouth daily      Facility-Administered Medications: None     Current Facility-Administered Medications   Medication Dose Route Frequency     sodium chloride 0.9 %  100 mL Intravenous Once     iopamidol (ISOVUE-370)  75 mL Intravenous Once     Current Facility-Administered Medications   Medication Last Rate     heparin 850 Units/hr (01/04/23 1400)     Allergies   Allergies   Allergen Reactions     Chlorpheniramine-Phenylephrine Itching and Other (See Comments)     Other reaction(s): Other (See Comments)     Walnuts [Nuts]      Wheat Extract        Social History    reports that he has never smoked. He has never used smokeless tobacco. He reports current alcohol use. He reports current drug use. Drug: Marijuana.      Family History   I have reviewed this patient's family history and updated it with pertinent information if needed.  Family History   Problem Relation Age of Onset     Diabetes Father      Colon Cancer Paternal Grandmother           Review of Systems   A comprehensive review of system was performed and is negative other than that noted in the HPI or here.     Physical Exam   Vital Signs with Ranges  Temp:  [98  F (36.7  C)] 98  F (36.7  C)  Pulse:  [60-78] 75  Resp:  [11-26] 18  BP: (121-157)/(64-96) 142/88  SpO2:  [97 %-99 %] 97 %  Wt Readings from Last 4 Encounters:   01/04/23 68.2 kg (150 lb 6.4 oz)   12/27/22 76.2 kg (168 lb)   10/13/22 70.3 kg (155 lb)   10/07/22 69.4 kg (153 lb)     No  "intake/output data recorded.      Vitals: BP (!) 142/88   Pulse 75   Temp 98  F (36.7  C) (Oral)   Resp 18   Ht 1.753 m (5' 9\")   Wt 68.2 kg (150 lb 6.4 oz)   SpO2 97%   BMI 22.21 kg/m      Physical Exam:   General - Alert and oriented to time place and person in no acute distress, anxious appearing.  Eyes - No scleral icterus  HEENT - Neck supple, moist mucous membranes  Cardiovascular -regular rate and rhythm without murmur rub or gallop.  2+ radial ulnar and femoral pulses bilaterally all without bruit.  Extremities - There is no edema  Respiratory -lungs are clear without wheezes rales or rhonchi.  Skin - No pallor or cyanosis  Gastrointestinal - Non tender and non distended without rebound or guarding  Psych - Appropriate affect   Neurological - No gross motor neurological focal deficits    No lab results found in last 7 days.    Invalid input(s): TROPONINIES    Recent Labs   Lab 01/04/23  1153   WBC 5.4   HGB 12.6*   MCV 81         POTASSIUM 4.0   CHLORIDE 105   CO2 27   BUN 25   CR 1.09   GFRESTIMATED 74   ANIONGAP 7   SONA 9.0   GLC 99   ALBUMIN 3.4   PROTTOTAL 7.6   BILITOTAL 0.4   ALKPHOS 66   ALT 31   AST 27     Recent Labs   Lab Test 01/04/23  1153   CHOL 164   HDL 47   *   TRIG 61     Recent Labs   Lab 01/04/23  1153   WBC 5.4   HGB 12.6*   HCT 39.2*   MCV 81        No results for input(s): PH, PHV, PO2, PO2V, SAT, PCO2, PCO2V, HCO3, HCO3V in the last 168 hours.  No results for input(s): NTBNPI, NTBNP in the last 168 hours.  No results for input(s): DD in the last 168 hours.  No results for input(s): SED, CRP in the last 168 hours.  Recent Labs   Lab 01/04/23  1153        No results for input(s): TSH in the last 168 hours.  No results for input(s): COLOR, APPEARANCE, URINEGLC, URINEBILI, URINEKETONE, SG, UBLD, URINEPH, PROTEIN, UROBILINOGEN, NITRITE, LEUKEST, RBCU, WBCU in the last 168 hours.    Imaging:  Recent Results (from the past 48 hour(s))   Chest XR,  PA & " LAT    Narrative    XR CHEST 2 VIEWS 1/4/2023 1:53 PM    HISTORY: chest pain    COMPARISON: 12/28/2022      Impression    IMPRESSION: No infiltrate, pleural effusion or pneumothorax. The  cardiac and mediastinal silhouettes are normal.    PARMJIT BENAVIDES MD         SYSTEM ID:  [id number]       Echo:  No results found for this or any previous visit (from the past 4320 hour(s)).    Clinically Significant Risk Factors Present on Admission             # Hypoalbuminemia: Lowest albumin = 3.4 g/dL at 1/4/2023 11:53 AM, will monitor as appropriate

## 2023-01-04 NOTE — ED TRIAGE NOTES
ED last week and was admitted for MI. Pt. Here with chest tightness and shortness of breath. Pt. PCP sent pt here for work up. [facility][note 3] Dr. MAGNO Gusman [phone].

## 2023-01-04 NOTE — TELEPHONE ENCOUNTER
Patient calling.    Has an upcoming Coronary CTA appointment and wanting to know how to prepare for it. Patient reports provider who ordered the test is with another clinic, non-[facility]. Recommended patient follow up with provider for specific instructions.    Laura Piedra RN on 1/4/2023 at 9:03 AM      Reason for Disposition    Information only question and nurse able to answer    Protocols used: INFORMATION ONLY CALL - NO TRIAGE-A-OH

## 2023-01-04 NOTE — H&P
[facility]    History and Physical  Hospitalist       Date of Admission:  1/4/2023    Assessment & Plan       This is a 67-year-old male with history of sleep apnea, on CPAP, hyperlipidemia, diet-controlled, BPH, history of borderline IgA nephropathy, history of hemorrhoidal bleeding, status post hemorrhoidectomy, history of been anemia, comes to the hospital with complaint of off and on chest pain for 1-1/2 weeks.      ASSESSMENT AND PLAN:    1.  Non-ST elevation myocardial infarction:  This is a 67-year-old male with history of sleep apnea, anxiety, and hyperlipidemia, comes to the ED with off and on chest pain.  EKG showed no acute ischemic changes, but troponin is now elevated to 1200.  Hemoglobin is improved to 12.6.  At this point, we will admit him, give him aspirin and IV heparin.  I will start him on low-dose metoprolol 25 mg b.i.d. and start him on Lipitor 40 mg daily.  We will do serial troponins.  Consult Cardiology to evaluate the patient.  He recently had a stress echo, so we will not repeat the echo at this point.  He likely will need a coronary angiogram to rule out any obstruction given his elevated troponins at this time and is symptomatic.  2.  History of hyperlipidemia, diet-controlled currently.  We will check lipid panel and start him on Lipitor 40 mg daily at this time.  3.  BPH, on Flomax.  I will continue with that.  4.  History of anemia and hemorrhoidal bleeding:  He is status post hemorrhoidectomy.  No more bleeding.  He had a colonoscopy and EGD in October.  No ulcers noted.  Other than hemorrhoids, the colonoscopy was otherwise negative.  Hemoglobin improved to 12.6.  He is currently getting IV iron transfusions.  At this point, I do not think he needs any more transfusions as his hemoglobin improved to 12.6.  5.  History of IgA nephropathy:  He is followed by Nephrology, it is borderline, is just keeping an eye, not on any medication at this time.    6.   Obstructive sleep apnea, on CPAP.  We will continue CPAP while he is in the hospital at home settings.  7.  DVT prophylaxis with IV heparin.    CODE STATUS:  FULL CODE.    The case was discussed with the ED physician and the nursing staff taking care of the patient.    Aris Velasco MD  DVT Prophylaxis: Heparin   Code Status: Full Code    Disposition: Expected discharge in 2 days once stable .    Aris Velasco MD, MD    Primary Care Physician   Meenu Gusman    Chief Complaint   Chest pain     History is obtained from the patient    History of Present Illness   Admitted: 01/04/2023    HISTORY OF PRESENT ILLNESS:  This is a 67-year-old male with history of sleep apnea, on CPAP, hyperlipidemia, diet-controlled, BPH, history of borderline IgA nephropathy, history of hemorrhoidal bleeding, status post hemorrhoidectomy, history of been anemia, comes to the hospital with complaint of off and on chest pain for 1-1/2 weeks.    According to the patient, he noticed that he started having chest pain after the iron infusion that his PCP has been giving him.  This has been going on for last 1-1/2 weeks.  He was seen in the ED on 12/27/2022 for chest pain, was discharged home after rule out of ACS.  Troponins remained negative.  Since then, he has had off and on chest pain, mostly brought up on exertion.  It is retrosternal and radiates to both upper extremities.  Not associated with any headache, dizziness, lightheadedness, orthopnea, PND, palpitations, sweating or tremors.    The patient has had an exercise echo on 12/28/2022, which shows EKG changes of ST segment depression in V3, V4 with chest pain, but no inducible ischemia on the echocardiogram.  The patient continued to have this chest pain, so his PCP asked him to go to the ED.    While coming to the ED on a bus, the patient did have chest pain rated about 7/10 on pain scale retrosternal and radiated to bilateral arms.  Improved with resting.  At this time, he has  mild pain 1-2/10 on pain scale.       REVIEW OF SYSTEMS:  Rest of the review of system is negative at this time.  There is no family history of coronary artery disease.  Never had smoking.  It is not diabetes or hypertensive.  He is not on any medication for hyperlipidemia, but does show in his problem list that he does have history of hyperlipidemia.  Rest of the review of system is negative at this time.    Past Medical History    I have reviewed this patient's medical history and updated it with pertinent information if needed.   Past Medical History:   Diagnosis Date     BPH (benign prostatic hyperplasia)      Glaucoma      Hemorrhoids, unspecified hemorrhoid type      IgA nephropathy      Sleep apnea        Past Surgical History   I have reviewed this patient's surgical history and updated it with pertinent information if needed.  Past Surgical History:   Procedure Laterality Date     APPENDECTOMY       COLONOSCOPY N/A 10/7/2022    Procedure: COLONOSCOPY, WITH POLYPECTOMY AND BIOPSY;  Surgeon: Sylvie Jj MD;  Location:  GI     ESOPHAGOSCOPY, GASTROSCOPY, DUODENOSCOPY (EGD), COMBINED N/A 10/7/2022    Procedure: ESOPHAGOGASTRODUODENOSCOPY, WITH BIOPSY;  Surgeon: Sylvie Jj MD;  Location:  GI       Prior to Admission Medications   Prior to Admission Medications   Prescriptions Last Dose Informant Patient Reported? Taking?   latanoprost (XALATAN) 0.005 % ophthalmic solution   Yes No   Sig: Place 1 drop into both eyes every evening   psyllium (METAMUCIL/KONSYL) Packet   No No   Sig: Take 1 packet by mouth daily   tamsulosin (FLOMAX) 0.4 MG capsule   Yes No   Sig: Take 0.4 mg by mouth daily      Facility-Administered Medications: None     Allergies   Allergies   Allergen Reactions     Chlorpheniramine-Phenylephrine Itching and Other (See Comments)     Other reaction(s): Other (See Comments)     Walnuts [Nuts]      Wheat Extract        Social History   I have reviewed this patient's social history and  updated it with pertinent information if needed. Raúl Marie  reports that he has never smoked. He has never used smokeless tobacco. He reports current alcohol use. He reports current drug use. Drug: Marijuana.    Family History   I have reviewed this patient's family history and updated it with pertinent information if needed.   Family History   Problem Relation Age of Onset     Diabetes Father      Colon Cancer Paternal Grandmother        Review of Systems   CONSTITUTIONAL:  negative  EYES:  negative  HEENT:  negative  RESPIRATORY:  negative  CARDIOVASCULAR:  positive for  chest pain  GASTROINTESTINAL:  negative  GENITOURINARY:  negative  INTEGUMENT/BREAST:  negative  HEMATOLOGIC/LYMPHATIC:  negative  ALLERGIC/IMMUNOLOGIC:  negative  ENDOCRINE:  negative  MUSCULOSKELETAL:  negative  NEUROLOGICAL:  negative  BEHAVIOR/PSYCH:  negative    Physical Exam   Temp: 98  F (36.7  C) Temp src: Oral BP: (!) 157/96 Pulse: 71   Resp: 14 SpO2: 97 % O2 Device: None (Room air)    Vital Signs with Ranges  Temp:  [98  F (36.7  C)] 98  F (36.7  C)  Pulse:  [70-78] 71  Resp:  [11-18] 14  BP: (121-157)/(64-96) 157/96  SpO2:  [97 %-98 %] 97 %  150 lbs 6.4 oz    Constitutional: Awake, alert, cooperative, no apparent distress.  Eyes: Conjunctiva and pupils examined and normal.  HEENT: Moist mucous membranes, normal dentition.  Respiratory: Clear to auscultation bilaterally, no crackles or wheezing.  Cardiovascular: Regular rate and rhythm, normal S1 and S2, and no murmur noted.  GI: Soft, non-distended, non-tender, normal bowel sounds.  Lymph/Hematologic: No anterior cervical or supraclavicular adenopathy.  Skin: No rashes, no cyanosis, no edema.  Musculoskeletal: No joint swelling, erythema or tenderness.  Neurologic: Cranial nerves 2-12 intact, normal strength and sensation.  Psychiatric: Alert, oriented to person, place and time, no obvious anxiety or depression.    Data   Data reviewed today:  I personally reviewed the EKG  tracing showing NSR, No acute ischemic changes. .  Recent Labs   Lab 01/04/23  1153   WBC 5.4   HGB 12.6*   MCV 81         POTASSIUM 4.0   CHLORIDE 105   CO2 27   BUN 25   CR 1.09   ANIONGAP 7   SONA 9.0   GLC 99   ALBUMIN 3.4   PROTTOTAL 7.6   BILITOTAL 0.4   ALKPHOS 66   ALT 31   AST 27       No results found for this or any previous visit (from the past 24 hour(s)).

## 2023-01-04 NOTE — SIGNIFICANT EVENT
Pt arrived to CCU from cath lab after being diagnosed with SCAD having visual disturbances, RRT called

## 2023-01-04 NOTE — PROVIDER NOTIFICATION
MD Notification    Notified Person: MD Cardiologist     Notified Person Name: Dr. Muniz      Notification Date/Time: 1/4/23 8362     Notification Interaction: Verbal interaction     Purpose of Notification: Clarification of Ntg gtt, Heparin gtt, and IVF     Orders Received: Resume Ntg gtt for chest pain or pressure.  Resume Heparin gtt without bolus after TR Band removed and hemostasis achieved.  Continue NS IVF at 75 ml/hr (post cath orders) and discontinue NS KCl.

## 2023-01-04 NOTE — CODE/RAPID RESPONSE
[facility]    RRT Note  1/4/2023   Time Called: 1620    Code Status: Prior    I was called to evaluate Raúl Marie, who is a 67 year old male who was admitted on 1/4/2023 for chest pain in the setting of a recent abnormal stress test. PMH includes glaucoma, dyslipidemia, BPH, anxiety, SCAD.    Assessment & Plan     #Acute onset visual changes following coronary angiography  #SCAD: On my arrival, the patient is awake, semiupright at 45 degrees, in no obvious distress.  He is hemodynamically stable with /60s, heart rate 60s normal sinus rhythm, oxygen 98% on room air.  At the very end of his coronary angiogram (where 3 areas of spontaneous coronary artery dissection were noted, please see cardiology notes), he started noticing his typical glaucoma related visual changes developing.  They worsened in route to his CCU room, where he notified his bedside RN of the changes.    On my initial exam, the patient has no acute neurological deficits, specifically no field cuts.  He is calm, coherent and conversant.  Glucose 84.  He describes a large bright white V followed by blurred vision behind him, which is the same as his typical glaucoma related visual changes.  He notes a very mild headache, unsure of when it started.  He denies any dizziness, lightheadedness, nausea/vomiting or other acute complaints.    Although his visual changes seem to be consistent with an ocular migraine versus glaucoma flareup in the setting of worsening IOP, given his risk factors with a recent coronary angiogram and newly diagnosed SCAD, I discussed the case with   from stroke neuro who recommended imaging to ensure no ischemic versus neurologic CVA.    Within approximately 45 minutes of onset of symptoms, the patient's symptoms have resolved.  He has no further complaints.    INTERVENTIONS:  -STAT CT/CTA no perfusion  -MRI brain 1/5  -Glucose  -EKG  -consult stroke neuro    Dr. Muniz of  cardiology notified of acute visual changes and imaging pending.    At the conclusion of this RRT patient was hemodynamically stable and will remain on current unit.    Discussed with and defer further cares to Oleg Adams and Krista (Hospitalist)    Addendum 1845:  CT results show no ICH, but CT w/ contrast of neck shows bead like consistent with FMD. Could be the cause of his SCAD.   - pt has been updated of findings.       ABIODUN Braun CNP  Hospitalist Service - House FLO  Pager: [phone] ([phone])      Physical Exam   Vital Signs with Ranges:  Temp:  [97.5  F (36.4  C)-98  F (36.7  C)] 97.5  F (36.4  C)  Pulse:  [56-80] 56  Resp:  [0-26] 0  BP: (104-157)/(63-96) 108/66  SpO2:  [94 %-99 %] 96 %  No intake/output data recorded.      Physical Exam  Vitals and nursing note reviewed.   Constitutional:       General: He is not in acute distress.     Appearance: He is not ill-appearing, toxic-appearing or diaphoretic.   HENT:      Head: Atraumatic.   Eyes:      General: No visual field deficit.     Extraocular Movements:      Right eye: Normal extraocular motion and no nystagmus.      Left eye: Normal extraocular motion and no nystagmus.      Conjunctiva/sclera: Conjunctivae normal.      Pupils: Pupils are equal, round, and reactive to light.      Visual Fields: Right eye visual fields normal and left eye visual fields normal.   Cardiovascular:      Rate and Rhythm: Normal rate and regular rhythm.   Pulmonary:      Effort: Pulmonary effort is normal. No tachypnea or bradypnea.      Breath sounds: Normal breath sounds.   Abdominal:      General: Abdomen is flat.      Palpations: Abdomen is soft.      Tenderness: There is no abdominal tenderness.   Musculoskeletal:      Right lower leg: No edema.      Left lower leg: No edema.   Skin:     General: Skin is warm and dry.   Neurological:      General: No focal deficit present.      Mental Status: He is alert.      GCS: GCS eye subscore is 4. GCS verbal  subscore is 5. GCS motor subscore is 6.      Cranial Nerves: No cranial nerve deficit, dysarthria or facial asymmetry.      Sensory: Sensation is intact.      Motor: Motor function is intact.      Coordination: Coordination is intact.   Psychiatric:         Attention and Perception: Attention normal.         Mood and Affect: Mood normal.         Speech: Speech normal.         Behavior: Behavior normal. Behavior is cooperative.          Data     IMAGING: (X-ray/CT/MRI)   Recent Results (from the past 24 hour(s))   Chest XR,  PA & LAT    Narrative    XR CHEST 2 VIEWS 1/4/2023 1:53 PM    HISTORY: chest pain    COMPARISON: 12/28/2022      Impression    IMPRESSION: No infiltrate, pleural effusion or pneumothorax. The  cardiac and mediastinal silhouettes are normal.    PARMJIT BENAVIDES MD         SYSTEM ID:  [id number]   Cardiac Catheterization    Narrative    1.  Spontaneous coronary dissection (SCAD) of diagonal, OM and RPAV/RPL   branches. Given hemodynamic stability and relatively small size of the   involved vessels, no intervention was performed.        CBC with Diff:  Recent Labs   Lab Test 01/04/23  1153 12/27/22  2343 10/13/22  1453   WBC 5.4   < > 13.1*   HGB 12.6*   < > 8.0*   MCV 81   < > 76*      < > 288   INR  --   --  1.11    < > = values in this interval not displayed.      No results found for: RETICABSCT  No results found for: RETP    Comprehensive Metabolic Panel:  Recent Labs   Lab 01/04/23  1631 01/04/23  1153   NA  --  139   POTASSIUM  --  4.0   CHLORIDE  --  105   CO2  --  27   ANIONGAP  --  7   GLC 84 99   BUN  --  25   CR  --  1.09   GFRESTIMATED  --  74   SONA  --  9.0   PROTTOTAL  --  7.6   ALBUMIN  --  3.4   BILITOTAL  --  0.4   ALKPHOS  --  66   AST  --  27   ALT  --  31         Time Spent on this Encounter     I spent 70 minutes (1620 - 1730) of critical care time on the unit/floor managing the care of Raúl Marie. Upon evaluation, this patient had a high probability of imminent or  life-threatening deterioration due to acute neurological changes which required my direct attention, intervention, and personal management. 100% of my time was spent at the bedside counseling the patient and/or coordinating care regarding services listed in this note.

## 2023-01-04 NOTE — PRE-PROCEDURE
GENERAL PRE-PROCEDURE:     Written consent obtained?: Yes    Risks and benefits: Risks, benefits and alternatives were discussed    Consent given by:  Patient  Patient states understanding of procedure being performed: Yes    Patient's understanding of procedure matches consent: Yes    Procedure consent matches procedure scheduled: Yes    Expected level of sedation:  Moderate  Appropriately NPO:  Yes  ASA Class:  4  Mallampati  :  Grade 2- soft palate, base of uvula, tonsillar pillars, and portion of posterior pharyngeal wall visible  Lungs:  Lungs clear with good breath sounds bilaterally  Heart:  Normal heart sounds and rate  History & Physical reviewed:  History and physical reviewed and no updates needed  Statement of review:  I have reviewed the lab findings, diagnostic data, medications, and the plan for sedation

## 2023-01-04 NOTE — ED PROVIDER NOTES
"    History     Chief Complaint:  Chest Pain     The history is provided by the patient.      Raúl Marie is a 67 year old male with history of hyperlipidemia and CKD who presents with generalized chest pain which began approximately one week ago. Per chart review, he was seen here in the ED on 12/27 with chest tightness/pain which had begun earlier that day. After a negative workup for his chest pain, he was discharged to home with a plan to have an outpatient stress echo. He underwent the stress echo yesterday on 1/3 and was informed of the abnormal results showing inducible ischemia. He was instructed to present to the ED via private car as his EKG was reassuring. Since his visit to the ED one week ago, he has continued to have exertional chest pain/tightness, including when walking from the bus into the ED today. He describes his chest pain as an \"aching\" and a \"constricting\" sensation which is exacerbated by exertion. Presently, his chest pain has resolved. No associated shortness of breath, leg pain or swelling, fever, chills. He mentions that he also had a recent GI bleed in October 2022. He was experiencing rectal bleeding at the time, and his hemoglobin was noted to be 4.6. He was transfused 4 units of blood and had a hemorrhoidectomy performed. He has been taking supplemental iron since that time. No recent hematochezia, melena, or hematemesis. He has not taken any aspirin today nor is he anticoagulated at baseline.    Independent Historian: Yes     Review of External Notes: Epic, Care Everywhere     Review of Systems   Constitutional: Negative for chills and fever.   Respiratory: Negative for shortness of breath.    Cardiovascular: Positive for chest pain. Negative for leg swelling.   Gastrointestinal: Negative for blood in stool.        Hematemesis -   All other systems reviewed and are negative.    Allergies:  Chlorpheniramine-phenylephrine    Medications:  Flomax    Past Medical History:   " "  BPH  Glaucoma  Hemorrhoids  IgA nephropathy  Sleep apnea   Hyperlipidemia   Depression  Vitamin D deficiency  Cyst of kidney  Chronic back pain   CKD, stage III  Calculus of kidney   Shingles  Anxiety  Toe pain, chronic, right    Past Surgical History:    Appendectomy  Tonsillectomy   Adenoidectomy  Cystoscopy with urethral dilation  Laser eye surgery, right  Kidney stone surgery     Family History:    Father: diabetes, dementia  Mother: dementia    Social History:  The patient presents to the ED alone.  The patient presents to the ED via private vehicle.     Physical Exam     Patient Vitals for the past 24 hrs:   BP Temp Temp src Pulse Resp SpO2 Height Weight   01/04/23 1412 (!) 142/88 -- -- 75 18 97 % -- --   01/04/23 1402 131/81 -- -- 77 16 99 % -- --   01/04/23 1400 138/87 -- -- 60 14 98 % -- --   01/04/23 1319 (!) 145/87 -- -- 71 26 98 % -- 68.2 kg (150 lb 6.4 oz)   01/04/23 1307 -- -- -- 71 14 97 % -- --   01/04/23 1300 (!) 157/96 -- -- 78 11 98 % -- --   01/04/23 1131 121/64 98  F (36.7  C) Oral 70 18 97 % 1.753 m (5' 9\") 70.8 kg (156 lb)      Physical Exam  Nursing note and vitals reviewed.  Constitutional:  Appears well-developed and well-nourished.   HENT:   Head:    Atraumatic.   Mouth/Throat:   Oropharynx is clear and moist. No oropharyngeal exudate.   Eyes:    Pupils are equal, round, and reactive to light.   Neck:    Normal range of motion. Neck supple.      No tracheal deviation present. No thyromegaly present.   Cardiovascular:  Normal rate, regular rhythm, no murmur   Pulmonary/Chest: Breath sounds are clear and equal without wheezes or crackles.  Abdominal:   Soft. Bowel sounds are normal. Exhibits no distension and      no mass. There is no tenderness.      There is no rebound and no guarding.   Musculoskeletal:  Exhibits no edema.   Lymphadenopathy:  No cervical adenopathy.   Neurological:   Alert and oriented to person, place, and time.   Skin:    Skin is warm and dry. No rash noted. No " pallor.      Emergency Department Course     ECG  ECG taken at 1124, ECG read at 1310  Normal sinus rhythm  Normal ECG  Rate 69 bpm. KS interval 186 ms. QRS duration 94 ms. QT/QTc 382/409 ms. P-R-T axes 80 88 80.     Imaging:    Chest XR,  PA & LAT  No infiltrate, pleural effusion or pneumothorax. The  cardiac and mediastinal silhouettes are normal.   Report per radiology    Laboratory:  Labs Ordered and Resulted from Time of ED Arrival to Time of ED Departure   TROPONIN I - Abnormal       Result Value    Troponin I High Sensitivity 1,212 (*)    CBC WITH PLATELETS AND DIFFERENTIAL - Abnormal    WBC Count 5.4      RBC Count 4.86      Hemoglobin 12.6 (*)     Hematocrit 39.2 (*)     MCV 81      MCH 25.9 (*)     MCHC 32.1      RDW 23.4 (*)     Platelet Count 201      % Neutrophils 67      % Lymphocytes 24      % Monocytes 6      % Eosinophils 2      % Basophils 1      % Immature Granulocytes 0      NRBCs per 100 WBC 0      Absolute Neutrophils 3.6      Absolute Lymphocytes 1.3      Absolute Monocytes 0.3      Absolute Eosinophils 0.1      Absolute Basophils 0.0      Absolute Immature Granulocytes 0.0      Absolute NRBCs 0.0     LIPID REFLEX TO DIRECT LDL PANEL - Abnormal    Cholesterol 164      Triglycerides 61      Direct Measure HDL 47      LDL Cholesterol Calculated 105 (*)     Non HDL Cholesterol 117     COMPREHENSIVE METABOLIC PANEL - Normal    Sodium 139      Potassium 4.0      Chloride 105      Carbon Dioxide (CO2) 27      Anion Gap 7      Urea Nitrogen 25      Creatinine 1.09      Calcium 9.0      Glucose 99      Alkaline Phosphatase 66      AST 27      ALT 31      Protein Total 7.6      Albumin 3.4      Bilirubin Total 0.4      GFR Estimate 74     COVID-19 VIRUS (CORONAVIRUS) BY PCR - Normal    SARS CoV2 PCR Negative     HEMOGLOBIN A1C      Emergency Department Course & Assessments:       Interventions:  Medications   heparin 25,000 units in 0.45% NaCl 250 mL ANTICOAGULANT infusion (850 Units/hr Intravenous  Rate/Dose Verify 1/4/23 1513)   nitroGLYcerin 50 mg in D5W 250 mL PERIPHERAL IV infusion (20 mcg/min Intravenous Rate/Dose Change 1/4/23 1513)   aspirin (ASA) chewable tablet 324 mg (324 mg Oral Given 1/4/23 1324)   heparin loading dose for LOW INTENSITY TREATMENT * Give BEFORE starting heparin infusion (4,250 Units Intravenous Given 1/4/23 1358)   nitroGLYcerin (NITROSTAT) sublingual tablet 0.4 mg (0.4 mg Sublingual Given 1/4/23 1425)   iopamidol (ISOVUE-370) solution 75 mL (75 mLs Intravenous Not Given 1/4/23 1509)   100mL Saline Flush (100 mLs Intravenous Not Given 1/4/23 1509)      Independent Interpretation (X-rays, CTs, rhythm strip):  I reviewed ECG and CXR:  No STEMI on ECG, CXR normal aortic knob and lung fields    Consultations/Discussion of Management or Tests:  1307 I obtained history from the patient and performed an examination.  1327 I spoke with Dr. Velasco from the hospitalist service regarding the patient's presentation, findings here in the ED, and plan of care.   1338 I spoke with Dr. Muniz from cardiology regarding the patient's presentation and plan of care.   1425  Chest pain return so patient given NTG SL X 3  1513 NTG drip initiated  1514 Dr. Muniz from cardiology has arrived to the ED and is evaluating the patient at the bedside.  1535 Patient transferred to Cath lab.      Disposition:  The patient was admitted to the hospital under the care of Dr. Velasco.     Impression & Plan        Medical Decision Making:  Raúl Marie is a 67 year old male who presents with exertional chest pain in the setting of an abnormal stress echo yesterday. The history and risk factor analysis are consistent with NSTEMI. The workup in the Emergency Department reveals EKG abnormalities concerning for ischemia but did not meet criteria for STEMI. Additionally the patient has a significantly elevated troponin to support this diagnosis of non-STEMI.  His case is complicated by the fact that he had a recent GI bleed  requiring 4 units of packed RBCs thought to be due to hemorrhoidal bleeding, however his hemoglobin is 12.6 currently and he does not have any symptoms of GI bleeding which would not be expected since he had hemorrhoidectomy surgery which was thought to cure his GI bleeding.  I considered alternative diagnosis including but not limited to pulmonary embolism, blood loss anemia, hypertensive emergency, aortic dissection but history and ED evaluation is most consistent with cardiac ischemia as the source of the pain. After discussing with patient the risks and benefits of heparinization and given lack of contraindication to this therapy, heparin bolus was started. The patient received a dose of aspirin in the ED. Cardiology was consulted and agrees with management plan. There are no current signs of GI bleeding. Dr. Velasco has accepted the patient for admission for ongoing evaluation, monitoring and management. Dr. Munzi cardiologist evaluated the patient in the emergency department due to his return of his chest pain and requirement for nitroglycerin and non-STEMI with significantly elevated troponin he was taken to the Cath Lab for coronary angiogram.    Critical Care time was 40 minutes for this patient excluding procedures.      Diagnosis:    ICD-10-CM    1. Non-STEMI (non-ST elevated myocardial infarction) (H)  I21.4          Scribe Disclosure:  I, Laury Escobar, am serving as a scribe at 1:21 PM on 1/4/2023 to document services personally performed by Esmer Linares MD based on my observations and the provider's statements to me.     1/4/2023   Esmer Linares MD [place], Cheri Lee, MD  01/04/23 1545

## 2023-01-04 NOTE — PHARMACY-ADMISSION MEDICATION HISTORY
Pharmacy Medication History  Admission medication history interview status for the 1/4/2023  admission is complete. See EPIC admission navigator for prior to admission medications     Location of Interview: Patient room  Medication history sources: Spoke w/ patient.  Reviewed recent fill history through [facility].     Significant changes made to the medication list:  - Changed Xalantan to Vyzulta.    In the past week, patient estimated taking medication this percent of the time: greater than 90%    Medication reconciliation completed by provider prior to medication history? No    Time spent in this activity: 15 minutes    Prior to Admission medications    Medication Sig Last Dose Taking? Auth Provider Long Term End Date   latanoprostene bunod (VYZULTA) 0.024 % SOLN ophthalmic solution Place 1 drop into both eyes At Bedtime 1/3/2023 at pm Yes Unknown, Entered By History     psyllium (METAMUCIL/KONSYL) Packet Take 1 packet by mouth daily 1/3/2023 Yes Aldair Bartlett MD     tamsulosin (FLOMAX) 0.4 MG capsule Take 0.4 mg by mouth daily 1/3/2023 Yes Unknown, Entered By History         The information provided in this note is only as accurate as the sources available at the time of update(s)     Brandi Pichardo, BaudilioD, BCPS

## 2023-01-04 NOTE — H&P
Admitted: 01/04/2023    HISTORY OF PRESENT ILLNESS:  This is a 67-year-old male with history of sleep apnea, on CPAP, hyperlipidemia, diet-controlled, BPH, history of borderline IgA nephropathy, history of hemorrhoidal bleeding, status post hemorrhoidectomy, history of been anemia, comes to the hospital with complaint of off and on chest pain for 1-1/2 weeks.    According to the patient, he noticed that he started having chest pain after the iron infusion that his PCP has been giving him.  This has been going on for last 1-1/2 weeks.  He was seen in the ED on 12/27/2022 for chest pain, was discharged home after rule out of ACS.  Troponins remained negative.  Since then, he has had off and on chest pain, mostly brought up on exertion.  It is retrosternal and radiates to both upper extremities.  Not associated with any headache, dizziness, lightheadedness, orthopnea, PND, palpitations, sweating or tremors.    The patient has had an exercise echo on 12/28/2022, which shows EKG changes of ST segment depression in V3, V4 with chest pain, but no inducible ischemia on the echocardiogram.  The patient continued to have this chest pain, so his PCP asked him to go to the ED.    While coming to the ED on a bus, the patient did have chest pain rated about 7/10 on pain scale retrosternal and radiated to bilateral arms.  Improved with resting.  At this time, he has mild pain 1-2/10 on pain scale.      REVIEW OF SYSTEMS:  Rest of the review of system is negative at this time.  There is no family history of coronary artery disease.  Never had smoking.  It is not diabetes or hypertensive.  He is not on any medication for hyperlipidemia, but does show in his problem list that he does have history of hyperlipidemia.  Rest of the review of system is negative at this time.    ASSESSMENT AND PLAN:    1.  Non-ST elevation myocardial infarction:  This is a 67-year-old male with history of sleep apnea, anxiety, and hyperlipidemia, comes to  the ED with off and on chest pain.  EKG showed no acute ischemic changes, but troponin is now elevated to 1200.  Hemoglobin is improved to 12.6.  At this point, we will admit him, give him aspirin and IV heparin.  I will start him on low-dose metoprolol 25 mg b.i.d. and start him on Lipitor 40 mg daily.  We will do serial troponins.  Consult Cardiology to evaluate the patient.  He recently had a stress echo, so we will not repeat the echo at this point.  He likely will need a coronary angiogram to rule out any obstruction given his elevated troponins at this time and is symptomatic.  2.  History of hyperlipidemia, diet-controlled currently.  We will check lipid panel and start him on Lipitor 40 mg daily at this time.  3.  BPH, on Flomax.  I will continue with that.  4.  History of anemia and hemorrhoidal bleeding:  He is status post hemorrhoidectomy.  No more bleeding.  He had a colonoscopy and EGD in October.  No ulcers noted.  Other than hemorrhoids, the colonoscopy was otherwise negative.  Hemoglobin improved to 12.6.  He is currently getting IV iron transfusions.  At this point, I do not think he needs any more transfusions as his hemoglobin improved to 12.6.  5.  History of IgA nephropathy:  He is followed by Nephrology, it is borderline, is just keeping an eye, not on any medication at this time.    6.  Obstructive sleep apnea, on CPAP.  We will continue CPAP while he is in the hospital at home settings.  7.  DVT prophylaxis with IV heparin.    CODE STATUS:  FULL CODE.    The case was discussed with the ED physician and the nursing staff taking care of the patient.    Aris Velasco MD        D: 2023   T: 2023   MT: CORINNE    Name:     ELEAN COWART  MRN:      [id number]        Account:     [id number]   :      1955           Admitted:    2023       Document: [id number]

## 2023-01-04 NOTE — ED NOTES
DATE:  1/4/2023   TIME OF RECEIPT FROM LAB:  1245  LAB TEST:  troponin  LAB VALUE:  1212  RESULTS GIVEN WITH READ-BACK TO (PROVIDER):  [facility]  TIME LAB VALUE REPORTED TO PROVIDER:   1246

## 2023-01-05 ENCOUNTER — APPOINTMENT (OUTPATIENT)
Dept: OCCUPATIONAL THERAPY | Facility: CLINIC | Age: 68
DRG: 280 | End: 2023-01-05
Payer: MEDICARE

## 2023-01-05 ENCOUNTER — APPOINTMENT (OUTPATIENT)
Dept: OCCUPATIONAL THERAPY | Facility: CLINIC | Age: 68
DRG: 280 | End: 2023-01-05
Attending: INTERNAL MEDICINE
Payer: MEDICARE

## 2023-01-05 ENCOUNTER — TELEPHONE (OUTPATIENT)
Dept: CARDIOLOGY | Facility: CLINIC | Age: 68
End: 2023-01-05

## 2023-01-05 ENCOUNTER — APPOINTMENT (OUTPATIENT)
Dept: CARDIOLOGY | Facility: CLINIC | Age: 68
DRG: 280 | End: 2023-01-05
Attending: PHYSICIAN ASSISTANT
Payer: MEDICARE

## 2023-01-05 ENCOUNTER — APPOINTMENT (OUTPATIENT)
Dept: MRI IMAGING | Facility: CLINIC | Age: 68
DRG: 280 | End: 2023-01-05
Attending: NURSE PRACTITIONER
Payer: MEDICARE

## 2023-01-05 PROBLEM — I77.3: Status: ACTIVE | Noted: 2023-01-05

## 2023-01-05 PROBLEM — I25.42 SPONTANEOUS DISSECTION OF CORONARY ARTERY: Status: ACTIVE | Noted: 2023-01-05

## 2023-01-05 LAB
LVEF ECHO: NORMAL
MAGNESIUM SERPL-MCNC: 2 MG/DL (ref 1.6–2.3)
TROPONIN I SERPL HS-MCNC: 1231 NG/L
UFH PPP CHRO-ACNC: 0.17 IU/ML

## 2023-01-05 PROCEDURE — 99221 1ST HOSP IP/OBS SF/LOW 40: CPT | Mod: GC | Performed by: PSYCHIATRY & NEUROLOGY

## 2023-01-05 PROCEDURE — 83735 ASSAY OF MAGNESIUM: CPT | Performed by: INTERNAL MEDICINE

## 2023-01-05 PROCEDURE — 250N000013 HC RX MED GY IP 250 OP 250 PS 637: Performed by: STUDENT IN AN ORGANIZED HEALTH CARE EDUCATION/TRAINING PROGRAM

## 2023-01-05 PROCEDURE — 97165 OT EVAL LOW COMPLEX 30 MIN: CPT | Mod: GO | Performed by: OCCUPATIONAL THERAPIST

## 2023-01-05 PROCEDURE — 93321 DOPPLER ECHO F-UP/LMTD STD: CPT | Mod: 26 | Performed by: INTERNAL MEDICINE

## 2023-01-05 PROCEDURE — 36415 COLL VENOUS BLD VENIPUNCTURE: CPT | Performed by: INTERNAL MEDICINE

## 2023-01-05 PROCEDURE — 250N000013 HC RX MED GY IP 250 OP 250 PS 637: Performed by: PHYSICIAN ASSISTANT

## 2023-01-05 PROCEDURE — 97535 SELF CARE MNGMENT TRAINING: CPT | Mod: GO | Performed by: OCCUPATIONAL THERAPIST

## 2023-01-05 PROCEDURE — 93010 ELECTROCARDIOGRAM REPORT: CPT | Performed by: INTERNAL MEDICINE

## 2023-01-05 PROCEDURE — 250N000013 HC RX MED GY IP 250 OP 250 PS 637: Performed by: INTERNAL MEDICINE

## 2023-01-05 PROCEDURE — 99233 SBSQ HOSP IP/OBS HIGH 50: CPT | Mod: FS | Performed by: PHYSICIAN ASSISTANT

## 2023-01-05 PROCEDURE — 210N000001 HC R&B IMCU HEART CARE

## 2023-01-05 PROCEDURE — 99233 SBSQ HOSP IP/OBS HIGH 50: CPT | Performed by: INTERNAL MEDICINE

## 2023-01-05 PROCEDURE — 93321 DOPPLER ECHO F-UP/LMTD STD: CPT

## 2023-01-05 PROCEDURE — 93325 DOPPLER ECHO COLOR FLOW MAPG: CPT

## 2023-01-05 PROCEDURE — 85520 HEPARIN ASSAY: CPT | Performed by: INTERNAL MEDICINE

## 2023-01-05 PROCEDURE — 93325 DOPPLER ECHO COLOR FLOW MAPG: CPT | Mod: 26 | Performed by: INTERNAL MEDICINE

## 2023-01-05 PROCEDURE — 93005 ELECTROCARDIOGRAM TRACING: CPT

## 2023-01-05 PROCEDURE — 70553 MRI BRAIN STEM W/O & W/DYE: CPT | Mod: MF

## 2023-01-05 PROCEDURE — A9585 GADOBUTROL INJECTION: HCPCS | Performed by: NURSE PRACTITIONER

## 2023-01-05 PROCEDURE — 97110 THERAPEUTIC EXERCISES: CPT | Mod: GO | Performed by: OCCUPATIONAL THERAPIST

## 2023-01-05 PROCEDURE — 93308 TTE F-UP OR LMTD: CPT | Mod: 26 | Performed by: INTERNAL MEDICINE

## 2023-01-05 PROCEDURE — 255N000002 HC RX 255 OP 636: Performed by: NURSE PRACTITIONER

## 2023-01-05 RX ORDER — NALOXONE HYDROCHLORIDE 0.4 MG/ML
0.4 INJECTION, SOLUTION INTRAMUSCULAR; INTRAVENOUS; SUBCUTANEOUS
Status: DISCONTINUED | OUTPATIENT
Start: 2023-01-05 | End: 2023-01-07 | Stop reason: HOSPADM

## 2023-01-05 RX ORDER — NALOXONE HYDROCHLORIDE 0.4 MG/ML
0.2 INJECTION, SOLUTION INTRAMUSCULAR; INTRAVENOUS; SUBCUTANEOUS
Status: DISCONTINUED | OUTPATIENT
Start: 2023-01-05 | End: 2023-01-07 | Stop reason: HOSPADM

## 2023-01-05 RX ORDER — GADOBUTROL 604.72 MG/ML
7 INJECTION INTRAVENOUS ONCE
Status: COMPLETED | OUTPATIENT
Start: 2023-01-05 | End: 2023-01-05

## 2023-01-05 RX ORDER — MAGNESIUM OXIDE 400 MG/1
400 TABLET ORAL DAILY
Status: DISCONTINUED | OUTPATIENT
Start: 2023-01-05 | End: 2023-01-07 | Stop reason: HOSPADM

## 2023-01-05 RX ORDER — METOPROLOL TARTRATE 25 MG/1
25 TABLET, FILM COATED ORAL 2 TIMES DAILY
Qty: 60 TABLET | Refills: 0 | Status: SHIPPED | OUTPATIENT
Start: 2023-01-05 | End: 2023-01-07

## 2023-01-05 RX ORDER — ATORVASTATIN CALCIUM 40 MG/1
40 TABLET, FILM COATED ORAL DAILY
Qty: 90 TABLET | Refills: 0 | Status: SHIPPED | OUTPATIENT
Start: 2023-01-06 | End: 2023-01-17

## 2023-01-05 RX ORDER — ISOSORBIDE MONONITRATE 30 MG/1
30 TABLET, EXTENDED RELEASE ORAL DAILY
Status: DISCONTINUED | OUTPATIENT
Start: 2023-01-05 | End: 2023-01-07 | Stop reason: HOSPADM

## 2023-01-05 RX ORDER — SENNOSIDES 8.6 MG
1-2 TABLET ORAL 2 TIMES DAILY PRN
Status: DISCONTINUED | OUTPATIENT
Start: 2023-01-05 | End: 2023-01-07 | Stop reason: HOSPADM

## 2023-01-05 RX ORDER — NITROGLYCERIN 0.4 MG/1
TABLET SUBLINGUAL
Qty: 15 TABLET | Refills: 0 | Status: SHIPPED | OUTPATIENT
Start: 2023-01-05 | End: 2024-01-04

## 2023-01-05 RX ORDER — CLOPIDOGREL BISULFATE 75 MG/1
75 TABLET ORAL DAILY
Qty: 90 TABLET | Refills: 0 | Status: SHIPPED | OUTPATIENT
Start: 2023-01-06 | End: 2023-01-17

## 2023-01-05 RX ADMIN — CLOPIDOGREL BISULFATE 75 MG: 75 TABLET ORAL at 09:16

## 2023-01-05 RX ADMIN — MAGNESIUM OXIDE TAB 400 MG (241.3 MG ELEMENTAL MG) 400 MG: 400 (241.3 MG) TAB at 10:20

## 2023-01-05 RX ADMIN — ASPIRIN 81 MG: 81 TABLET, COATED ORAL at 09:16

## 2023-01-05 RX ADMIN — PSYLLIUM HUSK 1 PACKET: 3.4 POWDER ORAL at 12:22

## 2023-01-05 RX ADMIN — ATORVASTATIN CALCIUM 40 MG: 40 TABLET, FILM COATED ORAL at 09:16

## 2023-01-05 RX ADMIN — METOPROLOL TARTRATE 25 MG: 25 TABLET, FILM COATED ORAL at 09:16

## 2023-01-05 RX ADMIN — METOPROLOL TARTRATE 25 MG: 25 TABLET, FILM COATED ORAL at 21:40

## 2023-01-05 RX ADMIN — ACETAMINOPHEN 650 MG: 325 TABLET, FILM COATED ORAL at 00:01

## 2023-01-05 RX ADMIN — TAMSULOSIN HYDROCHLORIDE 0.4 MG: 0.4 CAPSULE ORAL at 09:16

## 2023-01-05 RX ADMIN — ACETAMINOPHEN 650 MG: 325 TABLET, FILM COATED ORAL at 20:41

## 2023-01-05 RX ADMIN — GADOBUTROL 7 ML: 604.72 INJECTION INTRAVENOUS at 08:52

## 2023-01-05 RX ADMIN — ACETAMINOPHEN 650 MG: 325 TABLET, FILM COATED ORAL at 09:25

## 2023-01-05 RX ADMIN — ISOSORBIDE MONONITRATE 30 MG: 30 TABLET, EXTENDED RELEASE ORAL at 16:59

## 2023-01-05 RX ADMIN — ACETAMINOPHEN 650 MG: 325 TABLET, FILM COATED ORAL at 05:30

## 2023-01-05 ASSESSMENT — ACTIVITIES OF DAILY LIVING (ADL)
ADLS_ACUITY_SCORE: 18
WEAR_GLASSES_OR_BLIND: NO
ADLS_ACUITY_SCORE: 18
ADLS_ACUITY_SCORE: 18
CONCENTRATING,_REMEMBERING_OR_MAKING_DECISIONS_DIFFICULTY: NO
DIFFICULTY_EATING/SWALLOWING: NO
TOILETING_ISSUES: NO
ADLS_ACUITY_SCORE: 18
ADLS_ACUITY_SCORE: 18
DOING_ERRANDS_INDEPENDENTLY_DIFFICULTY: NO
ADLS_ACUITY_SCORE: 35
DRESSING/BATHING_DIFFICULTY: NO
ADLS_ACUITY_SCORE: 18
CHANGE_IN_FUNCTIONAL_STATUS_SINCE_ONSET_OF_CURRENT_ILLNESS/INJURY: NO
ADLS_ACUITY_SCORE: 35
FALL_HISTORY_WITHIN_LAST_SIX_MONTHS: NO
ADLS_ACUITY_SCORE: 35
WALKING_OR_CLIMBING_STAIRS_DIFFICULTY: NO
ADLS_ACUITY_SCORE: 35

## 2023-01-05 NOTE — PLAN OF CARE
Goal Outcome Evaluation:    Plan Of Care Reviewed With: Patient     Pt returned to CCU after having an Angiogram.  Pt was diagnosed with SCAD in several arteries.  Pt arrived to CCU experiencing some visual disturbances.  RRT was initiated.  CT was completed with some abnormal findings (see scan), MRI will be completed 1/5 to further evaluate.  Pt was restarted on Ntg gtt for return of chest discomfort.  TR Band was removed around 2200, site C/D/I without bleeding or hematoma CMS intact. Heparin gtt was resumed without bolus.  Vitals stable and cardiac rhythm remains SR without ectopy.

## 2023-01-05 NOTE — PLAN OF CARE
Pt stable over night, VSS, no c/o sob, Pt does endorse chest pain at 0.5/10 that has not change since his Angiogram. He has been on a NTG and Heparin gtt.  Right wrist c/d/I post Angio, CMS WNL's.  No c/o vision changes noted.  PRN Tylenol was given for c/o a HA with good results.  Plan for today is a MRI of the Brain.

## 2023-01-05 NOTE — CONSULTS
"      [facility]    Stroke Telephone Note    I was called by  on 01/04/23 regarding patient Raúl Marie. The patient is a 67 year old male who was admitted two days ago for chest pain and underwent coronary angio procedure today where he had 3 areas of coronary vessel dissection. Right after angio procedure, patient experienced headache along with bilateral eye visual disturbances associated with a headache, transient for around 20 minutes and there were no other focal neurological deficits.    Imaging Findings   IMPRESSION:      HEAD CTA:   1.  No high-grade stenosis or large vessel occlusion involving the major intracranial arteries.  2.  No intracranial aneurysm or high flow vascular malformation is identified.     NECK CTA:  1.  Segmental luminal irregularity and tortuosity with alternating areas of mild stenosis and fusiform dilatation involving the mid to distal cervical internal carotid arteries bilaterally, concerning for fibromuscular dysplasia.  2.  Patent cervical vertebral arteries.    Impression  Bilateral transient visual field disturbances    Recommendations   Please obtain brain MRI WOUT contrast  Neuro checks q 4  BP goals less than 180  Based on MRI findings we will do further management      My recommendations are based on the information provided over the phone by Raúl Marie's in-person providers. They are not intended to replace the clinical judgment of his in-person providers. I was not requested to personally see or examine the patient at this time.    The Stroke Staff is Dr. Knox.    Alanna Dejesus MD  Vascular Neurology Fellow    To page me or covering stroke neurology team member, click here: [facility]  Choose \"On Call\" tab at top, then select \"NEUROLOGY/ALL SITES\" from middle drop-down box, press Enter, then look for \"stroke\" or \"telestroke\" for your site.      "

## 2023-01-05 NOTE — CONSULTS
"[facility]    Stroke Consult Note    Reason for Consult:  Transient eye visual disturbances following angio procedure    Chief Complaint: Chest Pain       HPI  Raúl Marie is a 67 year old male with complex medica history including severe GI bleeding, dyslipidemia, IgA nephropathy and recent GI bleed requiring blood transfusion. Patient underwent coronary angio procedure after being admitted with NSTEMI. Underwent angio procedure 1/4/22 and right after the procedure while he was being wheeled into his room he complained of headache sand bilateral eye blurriness with possible V shaped lines. Lasted less than an hour then resolved with no focal deficits.  Overnight, brain MRI obtained and was negative for acute ischemic stroke.      Imaging Review:    MRI brain results                                                         IMPRESSION: Diffuse cerebral volume loss and cerebral white matter  changes consistent with chronic small vessel ischemic disease. No  evidence for acute intracranial pathology.         Impression  Transient bilateral visual disturbances with headaches, post angio procedure    MRI obtained and did not show evidence of ischemic stroke. It is common for patient to develop post angio procedure headache with possible non specific blurry vision. There was no visual field cut after discussion with patient and no focal weakness, no speech disturbances.    Recommendations  No further recommendations from stroke stand point.     Patient Follow-up    - in 8 weeks with general neurology ([phone])    Thank you for this consult. No further stroke evaluation is recommended, so we will sign off. Please contact us with any additional questions.    The Stroke Staff is Dr. Knox.    Alanna Dejesus MD  Vascular Neurology Fellow    To page me or covering stroke neurology team member, click here: [facility]  Choose \"On Call\" tab at top, then select \"NEUROLOGY/ALL SITES\" from " "middle drop-down box, press Enter, then look for \"stroke\" or \"telestroke\" for your site.    _____________________________________________________       Past Medical History   Past Medical History:   Diagnosis Date     BPH (benign prostatic hyperplasia)      Glaucoma      Hemorrhoids, unspecified hemorrhoid type      IgA nephropathy      Sleep apnea      Past Surgical History   Past Surgical History:   Procedure Laterality Date     APPENDECTOMY       COLONOSCOPY N/A 10/7/2022    Procedure: COLONOSCOPY, WITH POLYPECTOMY AND BIOPSY;  Surgeon: Sylvie Jj MD;  Location:  GI     CV CORONARY ANGIOGRAM N/A 1/4/2023    Procedure: Coronary Angiogram;  Surgeon: Jose Elias Wahl MD;  Location:  [facility]ATH LAB     ESOPHAGOSCOPY, GASTROSCOPY, DUODENOSCOPY (EGD), COMBINED N/A 10/7/2022    Procedure: ESOPHAGOGASTRODUODENOSCOPY, WITH BIOPSY;  Surgeon: Sylvie Jj MD;  Location:  GI     Medications   Home Meds  Prior to Admission medications    Medication Sig Start Date End Date Taking? Authorizing Provider   aspirin (ASA) 81 MG chewable tablet Take 1 tablet (81 mg) by mouth daily Starting tomorrow. 1/5/23  Yes Kia Matson MD   atorvastatin (LIPITOR) 40 MG tablet Take 1 tablet (40 mg) by mouth daily 1/6/23  Yes Shirley Tompkins MD   clopidogrel (PLAVIX) 75 MG tablet Take 1 tablet (75 mg) by mouth daily 1/6/23  Yes Shirley Tompkins MD   latanoprostene bunod (VYZULTA) 0.024 % SOLN ophthalmic solution Place 1 drop into both eyes At Bedtime   Yes Unknown, Entered By History   metoprolol tartrate (LOPRESSOR) 25 MG tablet Take 1 tablet (25 mg) by mouth 2 times daily 1/5/23  Yes Shirley Tompkins MD   nitroGLYcerin (NITROSTAT) 0.4 MG sublingual tablet For chest pain place 1 tablet under the tongue every 5 minutes for 3 doses. If symptoms persist 5 minutes after 1st dose call 911. 1/5/23  Yes Shirley Tompkins MD   psyllium (METAMUCIL/KONSYL) Packet Take 1 packet by mouth daily 10/10/22  Yes Aldair Bartlett MD "   tamsulosin (FLOMAX) 0.4 MG capsule Take 0.4 mg by mouth daily   Yes Unknown, Entered By History       Scheduled Meds    aspirin  81 mg Oral Daily     atorvastatin  40 mg Oral Daily     clopidogrel  75 mg Oral Daily     latanoprost  1 drop Both Eyes QPM     latanoprostene bunod  1 drop Both Eyes At Bedtime     magnesium oxide  400 mg Oral Daily     metoprolol tartrate  25 mg Oral BID     psyllium  1 packet Oral Daily     sodium chloride (PF)  3 mL Intracatheter Q8H     tamsulosin  0.4 mg Oral Daily       Infusion Meds    heparin 1,000 Units/hr (01/05/23 0919)     nitroGLYcerin 50 mg in D5W 250 mL 10 mcg/min (01/05/23 0715)     Percutaneous Coronary Intervention orders placed (this is information for BPA alerting)       ACE/ARB/ARNI NOT PRESCRIBED       STATIN NOT PRESCRIBED         PRN Meds  acetaminophen, fentaNYL, HOLD MEDICATION, HOLD MEDICATION, hydrALAZINE, lidocaine 4%, lidocaine (buffered or not buffered), metoprolol, midazolam, morphine, naloxone **OR** naloxone **OR** naloxone **OR** naloxone, nitroGLYcerin, ondansetron **OR** ondansetron, oxyCODONE **OR** oxyCODONE, Percutaneous Coronary Intervention orders placed (this is information for BPA alerting), ACE/ARB/ARNI NOT PRESCRIBED, STATIN NOT PRESCRIBED, sennosides, sodium chloride (PF)    Allergies   Allergies   Allergen Reactions     Chlorpheniramine-Phenylephrine Itching and Other (See Comments)     Other reaction(s): Other (See Comments)     Walnuts [Nuts]      Wheat Extract      Family History   Family History   Problem Relation Age of Onset     Diabetes Father      Colon Cancer Paternal Grandmother      Social History   Social History     Tobacco Use     Smoking status: Never     Smokeless tobacco: Never   Substance Use Topics     Alcohol use: Yes     Drug use: Yes     Types: Marijuana       Review of Systems   The 10 point Review of Systems is negative other than noted in the HPI or here.        PHYSICAL EXAMINATION   Temp:  [97.5  F (36.4  C)-97.8   F (36.6  C)] 97.8  F (36.6  C)  Pulse:  [56-99] 69  Resp:  [0-26] 12  BP: ()/(58-99) 131/71  Cuff Mean (mmHg):  [72] 72  SpO2:  [94 %-100 %] 96 %    Neurologic  Mental Status:  alert, oriented x 3, follows commands, speech clear and fluent, naming and repetition normal  Cranial Nerves:  visual fields intact, PERRL, EOMI with normal smooth pursuit, facial sensation intact and symmetric, facial movements symmetric, hearing not formally tested but intact to conversation, palate elevation symmetric and uvula midline, no dysarthria, shoulder shrug strong bilaterally, tongue protrusion midline  Motor:  normal muscle tone and bulk, no abnormal movements, able to move all limbs spontaneously, strength 5/5 throughout upper and lower extremities, no pronator drift  Reflexes:  toes down-going  Sensory:  light touch sensation intact and symmetric throughout upper and lower extremities, no extinction on double simultaneous stimulation   Coordination:  normal finger-to-nose and heel-to-shin bilaterally without dysmetria, rapid alternating movements symmetric  Station/Gait:  deferred    Dysphagia Screen  Per Nursing    Stroke Scales    NIHSS  1a. Level of Consciousness 0-->Alert, keenly responsive   1b. LOC Questions 0-->Answers both questions correctly   1c. LOC Commands 0-->Performs both tasks correctly   2.   Best Gaze 0-->Normal   3.   Visual 0-->No visual loss   4.   Facial Palsy 0-->Normal symmetrical movements   5a. Motor Arm, Left 0-->No drift, limb holds 90 (or 45) degrees for full 10 secs   5b. Motor Arm, Right 0-->No drift, limb holds 90 (or 45) degrees for full 10 secs   6a. Motor Leg, Left 0-->No drift, leg holds 30 degree position for full 5 secs   6b. Motor Leg, right 0-->No drift, leg holds 30 degree position for full 5 secs   7.   Limb Ataxia 0-->Absent   8.   Sensory 0-->Normal, no sensory loss   9.   Best Language 0-->No aphasia, normal   10. Dysarthria 0-->Normal   11. Extinction and Inattention  0-->No  abnormality   Total 0 (01/05/23 1444)           Imaging  I personally reviewed all imaging; relevant findings per HPI.    Labs Data   CBC  Recent Labs   Lab 01/04/23  1926 01/04/23  1153   WBC 5.9 5.4   RBC 4.51 4.86   HGB 11.6* 12.6*   HCT 35.4* 39.2*    201     Basic Metabolic Panel   Recent Labs   Lab 01/04/23  1631 01/04/23  1153   NA  --  139   POTASSIUM  --  4.0   CHLORIDE  --  105   CO2  --  27   BUN  --  25   CR  --  1.09   GLC 84 99   SONA  --  9.0     Liver Panel  Recent Labs   Lab 01/04/23  1153   PROTTOTAL 7.6   ALBUMIN 3.4   BILITOTAL 0.4   ALKPHOS 66   AST 27   ALT 31     INR    Recent Labs   Lab Test 10/13/22  1453 10/05/22  1802   INR 1.11 1.10      Lipid Profile    Recent Labs   Lab Test 01/04/23  1153   CHOL 164   HDL 47   *   TRIG 61     A1C    Recent Labs   Lab Test 01/04/23  1153   A1C 4.9     Troponin    Recent Labs   Lab 01/04/23  2340 01/04/23  1926 01/04/23  1153   TROPONINIS 1,231* 1,059* 1,212*          Stroke Consult Data Data   This was a non-emergent, non-telestroke consult.

## 2023-01-05 NOTE — PROGRESS NOTES
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 [employer] business.  Climbs many stairs for work   Self-Care   Usual Activity Tolerance good   Current Activity Tolerance moderate   Regular Exercise Yes   Activity/Exercise Type strength training   Exercise Amount/Frequency daily;45 mins  (free weights and resistance bands.  Pt is not walking for exercise on any set schedule)   Equipment Currently Used at Home none   Fall history within last six months no   General Information   Onset of Illness/Injury or Date of Surgery 01/05/23   Referring Physician Aris Velasco   Patient/Family Therapy Goal Statement (OT) return home and back to work.  Open to rehab as needed   Additional Occupational Profile Info/Pertinent History of Current Problem Pt is a 67 year old male admitted with chest pain.  Non-ST MI.  Pt was in angioplasty when an RRT was called for spontaneous coronary artery disection.  No stenting was done.  PMN includes sleep apnea on CPAP, HLD, hx borderline IgAneuropathy, hx hemorrhoidal bleeding, s/p hemorroidectomy   Performance Patterns (Routines, Roles, Habits) pt has been independent with basic ADLS and work.  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   Existing Precautions/Restrictions other (see comments)  (cardiology recommending gentle exericse below predicted Max HR)   General Observations and Info Pt moving around room, willing to participate   Cognitive Status Examination   Orientation Status orientation to person, place and time   Affect/Mental Status  (Cognitive) WNL   Visual Perception   Visual Impairment/Limitations corrective lenses full-time   Pain Assessment   Patient Currently in Pain No   Range of Motion Comprehensive   General Range of Motion no range of motion deficits identified   Strength Comprehensive (MMT)   General Manual Muscle Testing (MMT) Assessment no strength deficits identified   Coordination   Upper Extremity Coordination No deficits were identified   Bed Mobility   Bed Mobility supine-sit;sit-supine   Supine-Sit [place] (Bed Mobility) independent   Sit-Supine [place] (Bed Mobility) independent   Transfers   Transfers toilet transfer   Toilet Transfer   Type (Toilet Transfer) sit-stand;stand-sit   [place] Level (Toilet Transfer) independent   Clinical Impression   Criteria for Skilled Therapeutic Interventions Met (OT) Yes, treatment indicated   OT Diagnosis decreased independence secondary to a cardiac condition   OT Problem List-Impairments impacting ADL problems related to;activity tolerance impaired;strength;fear & anxiety   Assessment of Occupational Performance 1-3 Performance Deficits   Identified Performance Deficits decreased independence for functional mobility, work chores, exercise regimin   Planned Therapy Interventions (OT) IADL retraining;home program guidelines;progressive activity/exercise   Clinical Decision Making Complexity (OT) low complexity   Risk & Benefits of therapy have been explained evaluation/treatment results reviewed;care plan/treatment goals reviewed;patient   OT Total Evaluation Time   OT Eval, Low Complexity Minutes (27388) 15   OT Goals   Therapy Frequency (OT) 2 times/day   OT Predicted Duration/Target Date for Goal Attainment 01/08/23   OT Goals Cardiac Phase 1   OT: Understanding of cardiac education to maximize quality of life, condition management, and health outcomes Patient   OT: Perform aerobic activity with stable cardiovascular response 20 minutes;intermittent;ambulation;treadmill    OT: Functional/aerobic ambulation tolerance with stable cardiovascular response in order to return to home and community environment Greater than 300 feet;Independent   OT: Navigation of stairs simulating home set up with stable cardiovascular response in order to return to home and community environment Greater than 10 stairs;Independent   OT Discharge Planning   OT Plan Advance ambulation and TM, stairs.  Cardiac CR as able, check with MD reports and medical plan   OT Discharge Recommendation (DC Rec) home with outpatient cardiac rehab   OT Rationale for DC Rec Pt demonstrates decreased tolerance and endurance for daily activities including work.  Would benefit from skilled CR here and at Phase II to increase endurance and knowledge of a heart healthy lifestyle.  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   OT Brief overview of current status Pt able to ambulate with some stops to catch his breath.  Is a bit anxious waiting to hear cardiology reports.

## 2023-01-05 NOTE — PROGRESS NOTES
[facility]    Hospitalist Progress Note    Date of Service (when I saw the patient): 01/05/2023    Assessment & Plan   Raúl Marie is a 67 year old male who was admitted on 1/4/2023  Raúl Marie is a 67 year old male with past medical history significant for HLD, IgA nephropathy, recent severe GI bleed with recent hemorhoidectomy admitted on 1/4/2023 with chest pain and + trop.   .    ASSESSMENT AND PLAN:    1.  Non-ST elevation myocardial infarction: secondary to SCAD with underlying CAD.  Spontaneous coronary dissection (SCAD) of diagonal, OM and RPAV/RPL branches  Serial troponins [id number]  Cardiology consulted and are following.  Appreciate assistance.      Patient underwent coronary angiogram on 1/4/2023 which showed spontaneous coronary artery dissection with underlying coronary artery disease involving the diagonal, obtuse marginal, RPAV /RPL branches  Recommended aspirin and Plavix for at least 3 months.  Then aspirin indefinitely  Patient currently on heparin drip continue till 1600 then stop  Continue to wean off nitroglycerin drip.  Start Imdur 30 mg p.o. daily    Acute onset visual changes following coronary angiography;  Ruled out for acute stroke with negative MRI  Patient had these vision changes chronically happens every few months related to his underlying glaucoma of the eyes as per the patient.  Patient had these vision changes at the end of his coronary angiogram  House provider seen him for an RRT.  Appreciate their help  Patient underwent CT head which which was negative CTA head and neck was negative except for fibromuscular dysplasia of the blood vessels with intermittent narrowing  MRI of the brain was done which showed diffuse stable volume loss and cerebral white matter changes consistent with chronic small vessel ischemic disease no evidence for acute intracranial pathology    Patient's symptoms much improved today  Recommended outpatient follow-up  with ophthalmology  2.  History of hyperlipidemia  LDL at 105  On Lipitor 40 mg p.o. daily      3.  BPH, on Flomax.  I will continue with that.  4.  History of anemia and hemorrhoidal bleeding:  He is status post hemorrhoidectomy.  No more bleeding.  He had a colonoscopy and EGD in October.  No ulcers noted.  Other than hemorrhoids, the colonoscopy was otherwise negative.  Hemoglobin improved to 12.6.      He is currently getting IV iron transfusions.  At this point, I do not think he needs any more transfusions as his hemoglobin improved to 12.6.  Hemoglobin at 11.6 on 1/5/2023 recheck tomorrow    5.  History of IgA nephropathy:  He is followed by Nephrology, it is borderline, is just keeping an eye, not on any medication at this time.  Creatinine is stable at 1.09   6.  Obstructive sleep apnea, on CPAP.  We will continue CPAP while he is in the hospital at home settings.  7.  DVT prophylaxis with IV heparin.    Constipation;   Bowel regimen started     CODE STATUS:  FULL CODE.         Disposition: Expected discharge in 1 to 2 days if remains stable  Discussed with bedside RN and patient today  Cardiology notes reviewed.     Greater than 55 minutes were spent in taking care of this patient today and reviewing the chart, counseling and counseling the patient and discussion with bedside RN    Shirley Tmopkins MD, MD  [phone] (P)      Interval History   Patient sitting comfortably in bed.  Denies any chest pain or shortness of breath currently.  Vision changes improved.  MRI brain done this morning returned negative.  No other acute issues since yesterday.  Currently on IV heparin drip    -Data reviewed today: I reviewed all new labs and imaging results over the last 24 hours. I personally reviewed the brain MRI image(s) showing No acute findings.    Physical Exam   Temp: 97.8  F (36.6  C) Temp src: Oral BP: 131/71 (Post CR exercise) Pulse: 69   Resp: 12 SpO2: 96 % O2 Device: Nasal cannula Oxygen Delivery: 2  LPM  Vitals:    01/04/23 1131 01/04/23 1319 01/05/23 0640   Weight: 70.8 kg (156 lb) 68.2 kg (150 lb 6.4 oz) 68.9 kg (152 lb)     Vital Signs with Ranges  Temp:  [97.5  F (36.4  C)-97.8  F (36.6  C)] 97.8  F (36.6  C)  Pulse:  [56-99] 69  Resp:  [0-26] 12  BP: ()/(58-99) 131/71  Cuff Mean (mmHg):  [72] 72  SpO2:  [94 %-100 %] 96 %  I/O last 3 completed shifts:  In: 340 [P.O.:340]  Out: 1000 [Urine:1000]    Constitutional: Awake, alert, cooperative, no apparent distress  Respiratory: Clear to auscultation bilaterally, no crackles or wheezing  Cardiovascular: Regular rate and rhythm, normal S1 and S2, and no murmur noted  GI: Normal bowel sounds, soft, non-distended, non-tender  Skin/Integumen: No rashes, no cyanosis, no edema  Other:     Medications     heparin 1,000 Units/hr (01/05/23 0919)     nitroGLYcerin 50 mg in D5W 250 mL 10 mcg/min (01/05/23 0715)     Percutaneous Coronary Intervention orders placed (this is information for BPA alerting)       ACE/ARB/ARNI NOT PRESCRIBED       STATIN NOT PRESCRIBED         aspirin  81 mg Oral Daily     atorvastatin  40 mg Oral Daily     clopidogrel  75 mg Oral Daily     latanoprost  1 drop Both Eyes QPM     latanoprostene bunod  1 drop Both Eyes At Bedtime     magnesium oxide  400 mg Oral Daily     metoprolol tartrate  25 mg Oral BID     psyllium  1 packet Oral Daily     sodium chloride (PF)  3 mL Intracatheter Q8H     tamsulosin  0.4 mg Oral Daily       Data   Recent Labs   Lab 01/04/23  1926 01/04/23  1631 01/04/23  1153   WBC 5.9  --  5.4   HGB 11.6*  --  12.6*   MCV 79  --  81     --  201   NA  --   --  139   POTASSIUM  --   --  4.0   CHLORIDE  --   --  105   CO2  --   --  27   BUN  --   --  25   CR  --   --  1.09   ANIONGAP  --   --  7   SONA  --   --  9.0   GLC  --  84 99   ALBUMIN  --   --  3.4   PROTTOTAL  --   --  7.6   BILITOTAL  --   --  0.4   ALKPHOS  --   --  66   ALT  --   --  31   AST  --   --  27       Recent Results (from the past 24 hour(s))    Chest XR,  PA & LAT    Narrative    XR CHEST 2 VIEWS 1/4/2023 1:53 PM    HISTORY: chest pain    COMPARISON: 12/28/2022      Impression    IMPRESSION: No infiltrate, pleural effusion or pneumothorax. The  cardiac and mediastinal silhouettes are normal.    PARMJIT BENAVIDES MD         SYSTEM ID:  [id number]   Cardiac Catheterization    Narrative    1.  Spontaneous coronary dissection (SCAD) of diagonal, OM and RPAV/RPL   branches. Given hemodynamic stability and relatively small size of the   involved vessels, no intervention was performed.    CT Head w/o Contrast    Narrative    EXAM: CT HEAD W/O CONTRAST  LOCATION: [facility]  DATE/TIME: 1/4/2023 5:53 PM    INDICATION: Visual changes post coronary angiography.    COMPARISON: None.    TECHNIQUE: Routine CT head without IV contrast. Multiplanar reformats. Dose reduction techniques were used.    FINDINGS:  INTRACRANIAL CONTENTS: No intracranial hemorrhage, extra-axial collection, or mass effect. No CT evidence of acute infarct. Normal parenchymal attenuation. Mild generalized cerebral volume loss. No hydrocephalus. Scattered intracranial atherosclerotic   calcifications.    VISUALIZED ORBITS/SINUSES/MASTOIDS: No intraorbital abnormality. No paranasal sinus mucosal disease. No middle ear or mastoid effusion. Presumed cerumen in the right external auditory canal.    BONES/SOFT TISSUES: No acute abnormality.      Impression    IMPRESSION: No CT findings of an acute intracranial process.     CTA Head Neck with Contrast    Narrative    EXAM: CTA HEAD NECK W CONTRAST  LOCATION: [facility]  DATE/TIME: 1/4/2023 5:55 PM    INDICATION: Visual changes post coronary angiography.  COMPARISON: CT head same day.  CONTRAST: 75 mL Isovue 370.  TECHNIQUE: Head and neck CT angiogram with IV contrast. Axial helical CT images of the head and neck vessels obtained during the arterial phase of intravenous contrast administration. Axial 2D  reconstructed images and multiplanar 3D MIP reconstructed   images of the head and neck vessels were performed by the technologist. Dose reduction techniques were used. All stenosis measurements made according to NASCET criteria unless otherwise specified.    FINDINGS:   HEAD CTA:  ANTERIOR CIRCULATION: Mild nonflow limiting atherosclerosis of the carotid siphons. No high-grade proximal arterial stenosis/occlusion, aneurysm, or high flow vascular malformation. Standard [place] anatomy.    POSTERIOR CIRCULATION: No high-grade proximal arterial stenosis/occlusion, aneurysm, or high flow vascular malformation. Balanced vertebral arteries supply a normal basilar artery.     DURAL VENOUS SINUSES: Expected enhancement of the major dural venous sinuses.    NECK CTA:  CAROTID ARTERIES: The bilateral common carotid arteries appear widely patent. Minimal atherosclerosis without stenosis involving the right carotid bifurcation. Segmental beaded appearing luminal irregularity with alternating mild stenoses and fusiform   areas of dilatation involving the mid to distal cervical right internal carotid artery with ICA lumen measuring up to 9-10 mm in diameter in the axial plane (series 9 image 344). Tortuosity of the distal cervical right ICA. There is also segmental   beaded-appearing luminal irregularity with alternating mild stenoses and areas of fusiform dilatation involving the mid to distal cervical left internal carotid artery with tortuosity, again measuring up to 9-10 mm in diameter. Findings are concerning   for bilateral internal carotid artery fibromuscular dysplasia.    VERTEBRAL ARTERIES: No focal stenosis or dissection. Balanced vertebral arteries.    AORTIC ARCH: Bovine origin left common carotid artery. No significant stenosis at the origin of the great vessels.    NONVASCULAR STRUCTURES: Multilevel degenerative changes are noted in the cervical spine. This results in varying degrees of spinal canal and  neural foraminal stenosis.      Impression    IMPRESSION:     HEAD CTA:   1.  No high-grade stenosis or large vessel occlusion involving the major intracranial arteries.  2.  No intracranial aneurysm or high flow vascular malformation is identified.    NECK CTA:  1.  Segmental luminal irregularity and tortuosity with alternating areas of mild stenosis and fusiform dilatation involving the mid to distal cervical internal carotid arteries bilaterally, concerning for fibromuscular dysplasia.  2.  Patent cervical vertebral arteries.   MR Brain w/o & w Contrast    Narrative    MRI OF THE BRAIN WITHOUT AND WITH CONTRAST 1/5/2023 8:37 AM     COMPARISON: Head CT 1/4/2023.    HISTORY: Headache. Neurologic deficit, non-traumatic. Visual symptoms.  Neurologic deficit onset </= 24 hours. No known/automatically detected  potential contraindications to iodinated contrast.    TECHNIQUE: Axial diffusion-weighted with ADC map, axial T2-weighted  with fat saturation, axial T1-weighted, axial turboFLAIR and coronal  T1-weighted images of the brain were acquired without intravenous  contrast.  Following intravenous administration of gadolinium (7mL  Gadavist), axial T1-weighted images of the brain were acquired.     FINDINGS: There is minimal diffuse cerebral volume loss. There are a  few tiny scattered focal areas of abnormal T2 signal hyperintensity in  the cerebral white matter bilaterally that are consistent with sequela  of chronic small vessel ischemic disease.    The ventricles and basal cisterns are within normal limits in  configuration given the degree of cerebral volume loss. There is no  midline shift. There are no extra-axial fluid collections. There is no  evidence for stroke or acute intracranial hemorrhage. There is no  abnormal contrast enhancement in the brain or its coverings.    There is no sinusitis or mastoiditis.      Impression    IMPRESSION: Diffuse cerebral volume loss and cerebral white matter  changes  consistent with chronic small vessel ischemic disease. No  evidence for acute intracranial pathology.    ISABEL NJ MD         SYSTEM ID:  [id number]

## 2023-01-05 NOTE — PROGRESS NOTES
[facility]  Cardiology Progress Note  Date of Service: 01/05/2023  Primary Cardiologist: will be Dr. Muniz    Assessment & Plan    Raúl Marie is a 67 year old male with past medical history significant for HLD, IgA nephropathy, recent severe GI bleed with recent hemorhoidectomy admitted on 1/4/2023 with chest pain and + trop.     Assessment:  1.  NSTEMI secondary to SCAD with underlying CAD.  Spontaneous coronary dissection (SCAD) of diagonal, OM and RPAV/RPL branches.   - review of recent literature on Spontaneous Coronary Artery Dissection: [facility] State-of-the-Art Review J Am Josué Cardiol. 2020 Aug, [phone] recommends minimum DAPT for 2-4 wks then asa for for 3-12 months.  Discussed as a team, with NSTEMI and underlying CAD will treat with dapt x 3 months.    -No arrhythmia on telemetry.    2.  2.  Recent severe GI bleed requiring 4 units of blood transfusion now status post hemorrhoidectomy.  The patient has gotten multiple IV iron infusions with normalization of his anemia.       3.  Dyslipidemia, , hdl 47, tot chol 164- started on lipitor 40       4.  IgA nephropathy, with baseline creatinine about 1.1-1.2.  In his normal range at 1.1 today.    5.  Fibromuscular dysplasia noted on CT of head/ neck noted in the carotids without significant stenosis, will need outpt CTA of renals   - we had a long discussion of known and unknown genetic mutations. Will refer to genetic counseling as outpt    6.  Polypharmacy with pt following with a naturopath for adrenal fatigue, mental fogginess etc.  Request pt give us a list of supplements and pharm to review for interactions.      Plan:   1. Continue heparin until 1600 then stop  2. Imdur 30 mg daily, wean off nitroglycerin gtt  3. Repeat limited echo today to reassess EF  4. Continue asa and plavix for 3 months, then asa alone for 1 year.  5. inpt outpt cardiac rehab  6. Should have prn nitroglycerin on discontinue  7. Outpt CTA  of renal arteries  8. Outpt genetic referral for fmd and scad  9. Please give info the the [place] SCAD program, noted he should join scad facebook group  10. intpt tonight, will see how he does with meds, possible discharge tomorrow afternoon.      11. We will arrange for early follow-up in 1 week and again in 1 month.    Trena Addison PA-C  [facility]  Pager: [phone]     I spent 75 minutes face-to-face and/or coordinating care of Raúl Marie. Over 50% of our time on the unit was spent counseling the patient and/or coordinating care regarding his or her cardiac condition.    Interval History   Patient developed a headache so nitro drip was turned down.  With nitro drip turned down his between 10 chest discomfort.  He did well with cardiac rehab.  He has multiple questions and concerns about what happened and is interested in deeper understanding of this.  We discussed correcting future episodes, likelihood of future episodes, use of medications to control symptoms, when to present to the ER, pathophysiology SCAD and FMD, and the relative lack of data around both.  He sees a naturopath and has had some holistic testing done and is on multiple supplements for his things like adrenal fatigue mental fogginess, takes high doses of magnesium.  He continues to be concerned with the fact that this occurred after his last iron infusion and notes that he found article in [place] relating iron infusion MI.      He does not have children.  He works as a  and he has his own company.  He is concerned about being unable to work for several weeks to months.    Physical Exam   Temp: 97.8  F (36.6  C) Temp src: Oral BP: 131/71 (Post CR exercise) Pulse: 69   Resp: 12 SpO2: 96 % O2 Device: Nasal cannula Oxygen Delivery: 2 LPM  Vitals:    01/04/23 1131 01/04/23 1319 01/05/23 0640   Weight: 70.8 kg (156 lb) 68.2 kg (150 lb 6.4 oz) 68.9 kg (152 lb)   Well-developed anxious appearing gentleman in no acute distress.  Normocephalic  atraumatic.  Heart is regular rate rhythm without murmur rub or gallop lungs are clear without wheezes rales or rhonchi extremities without peripheral edema right radial pulses 2+ incision is clean dry and intact and well-healed no bruit.    Clinically Significant Risk Factors Present on Admission           # Hypoalbuminemia: Lowest albumin = 3.4 g/dL at 1/4/2023 11:53 AM, will monitor as appropriate                 Medications     heparin 1,000 Units/hr (01/05/23 0919)     nitroGLYcerin 50 mg in D5W 250 mL 10 mcg/min (01/05/23 0715)     Percutaneous Coronary Intervention orders placed (this is information for BPA alerting)       ACE/ARB/ARNI NOT PRESCRIBED       STATIN NOT PRESCRIBED         aspirin  81 mg Oral Daily     atorvastatin  40 mg Oral Daily     clopidogrel  75 mg Oral Daily     latanoprost  1 drop Both Eyes QPM     latanoprostene bunod  1 drop Both Eyes At Bedtime     magnesium oxide  400 mg Oral Daily     metoprolol tartrate  25 mg Oral BID     psyllium  1 packet Oral Daily     sodium chloride (PF)  3 mL Intracatheter Q8H     tamsulosin  0.4 mg Oral Daily       Data   Last 24 hours labs reviewed       Tele: sinus

## 2023-01-05 NOTE — PLAN OF CARE
Goal Outcome Evaluation:      Plan of Care Reviewed With: patient    Overall Patient Progress: improvingOverall Patient Progress: improving    [facility] Nursing Note    Patient Information  Name: Raúl Marie  Age: 67 year old    Assessment  Orientation/Neuro: Alert and Oriented x4  Cardiac/Tele: NSR  Resp: WDL no shortness of breath  GI/: WDL No nausea, vomiting, abdominal pain, diarrhea, constipation or change in bowel habits, bowel meds ordered - pt takes bowel meds at home, POSITIVE for: BPH  CMS: radial site WDL   Mobility: walks with assist due to IV pole, demonstrates safe ambulation  Pain: 1-2/10 chest pain throughout the shift, cards notified, plan to change ntg gtt to imdur, no need for central line   Diet: Orders Placed This Encounter      Low Saturated Fat Na <2400 mg      Diet    Vital Signs  B/P: 132/82, T: 97.8, P: 60, R: 12, O2: 95 on RA    Plan  Hep stopped and need for oral anticoag, ntg gtt will stop and change to imdur, potential plan to discharge tomorrow    Vesta Anderson RN

## 2023-01-05 NOTE — PROGRESS NOTES
"SPIRITUAL HEALTH SERVICES Progress Note  [facility] [place]    Saw pt Raúl Marie per consult for health care directive.     Responded to a consult order for Health Care Directive. Offered education regarding purpose and process for completing the document. Materials and information from the [facility] website for Honoring Choices was left with patient for consideration and completion. Raúl expressed that he's likely to complete the paperwork once he leaves the hospital.    Patient/Family Understanding of Illness and Goals of Care - Brought himself to the ER via bus and a walk through the snow for chest pain. 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  He reflected on his feelings of anger related to prior medical advice and his own decision to bring himself to the hospital rather than call an ambulance yesterday and the implications for heart damage.    Strengths, Coping, and Resources - Raúl named the support of his significant other. He has been using mindfulness meditation in the last the last year which \"has been very helpful.\" He also shared that writing and journaling are helpful in processing his feelings.    Meaning, Beliefs, and Spirituality - \"It's not happening yet, but I know that I'll learn from this.\"    I offered spiritual and emotional support through reflective listening that affirmed emotions, experience, and meaning. Raúl is aware of [facility] support and how to request it if desired for completing the health care directive or ongoing support.    Plan of Care - [facility] remains available for support as requested.    Mary Talbert MDiv  Associate   Pager " [phone]  [facility] pager [phone]  [facility] phone [phone]    [facility] available 24/7 for emergent requests/referrals, either by having the on-call  paged or by entering an ASAP/STAT consult in Epic (this will also page the on-call ).

## 2023-01-05 NOTE — CONSULTS
NUTRITION EDUCATION    REASON FOR ASSESSMENT:  Nutrition education on American Heart Association (AHA) Heart Healthy Diet.    NUTRITION HISTORY:  Information obtained from patient.  - Patient was somewhat familiar with a HH diet but was interested in more information. Took notes during session and asked several questions.  - Usual breakfast is oatmeal with blueberries and peanuts or beans and veggies  - States that he does not eat many packaged or processed foods and prefers fresh foods.   - Typically cooks with olive oil and coconut oil.  - Reports eating fruits and vegetables regularly      CURRENT DIET ORDER:  Low Saturated Fat, Na <2400mg    NUTRITION DIAGNOSIS:  Food- and nutrition-related knowledge deficit R/t  exposure to heart healthy diet as evidenced by asking appropriate questions.    INTERVENTIONS:  Nutrition Prescription:    Recommended AHA Heart Healthy Diet    Implementation:     Nutrition Education (Content):  a) reviewed Heart Healthy Diet guidelines  b) provided heart healthy diet handout    Nutrition Education (Application):  a) Discussed current eating habits and recommended alternative food choices    Anticipate good compliance    Diet Education - refer to Education flowsheet    Goals:    Patient verbalizes understanding of diet.    All of the above goals met during education session    Follow Up/Monitoring:    Provided RD contact information for future questions          Bryanna Dawn  Dietetic Intern

## 2023-01-06 ENCOUNTER — APPOINTMENT (OUTPATIENT)
Dept: OCCUPATIONAL THERAPY | Facility: CLINIC | Age: 68
DRG: 280 | End: 2023-01-06
Payer: MEDICARE

## 2023-01-06 LAB
ANION GAP SERPL CALCULATED.3IONS-SCNC: 8 MMOL/L (ref 3–14)
ATRIAL RATE - MUSE: 57 BPM
ATRIAL RATE - MUSE: 60 BPM
BUN SERPL-MCNC: 20 MG/DL (ref 7–30)
CALCIUM SERPL-MCNC: 9.3 MG/DL (ref 8.5–10.1)
CHLORIDE BLD-SCNC: 105 MMOL/L (ref 94–109)
CO2 SERPL-SCNC: 26 MMOL/L (ref 20–32)
CREAT SERPL-MCNC: 1.08 MG/DL (ref 0.66–1.25)
DIASTOLIC BLOOD PRESSURE - MUSE: NORMAL MMHG
DIASTOLIC BLOOD PRESSURE - MUSE: NORMAL MMHG
ERYTHROCYTE [DISTWIDTH] IN BLOOD BY AUTOMATED COUNT: 23.3 % (ref 10–15)
GFR SERPL CREATININE-BSD FRML MDRD: 75 ML/MIN/1.73M2
GLUCOSE BLD-MCNC: 96 MG/DL (ref 70–99)
HCT VFR BLD AUTO: 38.5 % (ref 40–53)
HGB BLD-MCNC: 12.4 G/DL (ref 13.3–17.7)
INTERPRETATION ECG - MUSE: NORMAL
INTERPRETATION ECG - MUSE: NORMAL
MCH RBC QN AUTO: 25.7 PG (ref 26.5–33)
MCHC RBC AUTO-ENTMCNC: 32.2 G/DL (ref 31.5–36.5)
MCV RBC AUTO: 80 FL (ref 78–100)
P AXIS - MUSE: 55 DEGREES
P AXIS - MUSE: 68 DEGREES
PLATELET # BLD AUTO: 201 10E3/UL (ref 150–450)
POTASSIUM BLD-SCNC: 4 MMOL/L (ref 3.4–5.3)
PR INTERVAL - MUSE: 152 MS
PR INTERVAL - MUSE: 196 MS
QRS DURATION - MUSE: 100 MS
QRS DURATION - MUSE: 86 MS
QT - MUSE: 428 MS
QT - MUSE: 436 MS
QTC - MUSE: 424 MS
QTC - MUSE: 428 MS
R AXIS - MUSE: 74 DEGREES
R AXIS - MUSE: 80 DEGREES
RBC # BLD AUTO: 4.83 10E6/UL (ref 4.4–5.9)
SODIUM SERPL-SCNC: 139 MMOL/L (ref 133–144)
SYSTOLIC BLOOD PRESSURE - MUSE: NORMAL MMHG
SYSTOLIC BLOOD PRESSURE - MUSE: NORMAL MMHG
T AXIS - MUSE: 54 DEGREES
T AXIS - MUSE: 68 DEGREES
VENTRICULAR RATE- MUSE: 57 BPM
VENTRICULAR RATE- MUSE: 60 BPM
WBC # BLD AUTO: 5.7 10E3/UL (ref 4–11)

## 2023-01-06 PROCEDURE — 99233 SBSQ HOSP IP/OBS HIGH 50: CPT | Performed by: INTERNAL MEDICINE

## 2023-01-06 PROCEDURE — 36415 COLL VENOUS BLD VENIPUNCTURE: CPT | Performed by: INTERNAL MEDICINE

## 2023-01-06 PROCEDURE — 210N000001 HC R&B IMCU HEART CARE

## 2023-01-06 PROCEDURE — 250N000013 HC RX MED GY IP 250 OP 250 PS 637: Performed by: PHYSICIAN ASSISTANT

## 2023-01-06 PROCEDURE — 250N000013 HC RX MED GY IP 250 OP 250 PS 637: Performed by: INTERNAL MEDICINE

## 2023-01-06 PROCEDURE — 99232 SBSQ HOSP IP/OBS MODERATE 35: CPT | Performed by: INTERNAL MEDICINE

## 2023-01-06 PROCEDURE — 97110 THERAPEUTIC EXERCISES: CPT | Mod: GO | Performed by: OCCUPATIONAL THERAPIST

## 2023-01-06 PROCEDURE — 250N000013 HC RX MED GY IP 250 OP 250 PS 637: Performed by: STUDENT IN AN ORGANIZED HEALTH CARE EDUCATION/TRAINING PROGRAM

## 2023-01-06 PROCEDURE — 97535 SELF CARE MNGMENT TRAINING: CPT | Mod: GO | Performed by: OCCUPATIONAL THERAPIST

## 2023-01-06 PROCEDURE — 80048 BASIC METABOLIC PNL TOTAL CA: CPT | Performed by: INTERNAL MEDICINE

## 2023-01-06 PROCEDURE — 85027 COMPLETE CBC AUTOMATED: CPT | Performed by: INTERNAL MEDICINE

## 2023-01-06 RX ORDER — POLYETHYLENE GLYCOL 3350 17 G/17G
17 POWDER, FOR SOLUTION ORAL 2 TIMES DAILY PRN
Status: DISCONTINUED | OUTPATIENT
Start: 2023-01-06 | End: 2023-01-07 | Stop reason: HOSPADM

## 2023-01-06 RX ADMIN — OXYCODONE HYDROCHLORIDE 5 MG: 5 TABLET ORAL at 00:36

## 2023-01-06 RX ADMIN — CLOPIDOGREL BISULFATE 75 MG: 75 TABLET ORAL at 09:31

## 2023-01-06 RX ADMIN — POLYETHYLENE GLYCOL 3350 17 G: 17 POWDER, FOR SOLUTION ORAL at 11:10

## 2023-01-06 RX ADMIN — MAGNESIUM OXIDE TAB 400 MG (241.3 MG ELEMENTAL MG) 400 MG: 400 (241.3 MG) TAB at 09:30

## 2023-01-06 RX ADMIN — METOPROLOL TARTRATE 25 MG: 25 TABLET, FILM COATED ORAL at 21:27

## 2023-01-06 RX ADMIN — ISOSORBIDE MONONITRATE 30 MG: 30 TABLET, EXTENDED RELEASE ORAL at 09:30

## 2023-01-06 RX ADMIN — OXYCODONE HYDROCHLORIDE 5 MG: 5 TABLET ORAL at 21:24

## 2023-01-06 RX ADMIN — ACETAMINOPHEN 650 MG: 325 TABLET, FILM COATED ORAL at 09:36

## 2023-01-06 RX ADMIN — ASPIRIN 81 MG: 81 TABLET, COATED ORAL at 09:30

## 2023-01-06 RX ADMIN — PSYLLIUM HUSK 1 PACKET: 3.4 POWDER ORAL at 09:30

## 2023-01-06 RX ADMIN — ACETAMINOPHEN 650 MG: 325 TABLET, FILM COATED ORAL at 21:24

## 2023-01-06 RX ADMIN — TAMSULOSIN HYDROCHLORIDE 0.4 MG: 0.4 CAPSULE ORAL at 09:30

## 2023-01-06 RX ADMIN — ACETAMINOPHEN 650 MG: 325 TABLET, FILM COATED ORAL at 00:36

## 2023-01-06 RX ADMIN — ATORVASTATIN CALCIUM 40 MG: 40 TABLET, FILM COATED ORAL at 09:30

## 2023-01-06 ASSESSMENT — ACTIVITIES OF DAILY LIVING (ADL)
ADLS_ACUITY_SCORE: 18

## 2023-01-06 NOTE — PROGRESS NOTES
[facility]    Hospitalist Progress Note    Date of Service (when I saw the patient): 01/06/2023    Assessment & Plan   Raúl Marie is a 67 year old male who was admitted on 1/4/2023  Raúl Marie is a 67 year old male with past medical history significant for HLD, IgA nephropathy, recent severe GI bleed with recent hemorhoidectomy admitted on 1/4/2023 with chest pain and + trop.   .    ASSESSMENT AND PLAN:    1.  Non-ST elevation myocardial infarction: secondary to SCAD with underlying CAD.  Spontaneous coronary dissection (SCAD) of diagonal, OM and RPAV/RPL branches  Serial troponins [id number]  Cardiology consulted and are following.  Appreciate assistance.      Patient underwent coronary angiogram on 1/4/2023 which showed spontaneous coronary artery dissection with underlying coronary artery disease involving the diagonal, obtuse marginal, RPAV /RPL branches  Recommended aspirin and Plavix for at least 3 months.  Then aspirin indefinitely  Heparin drip stopped now   Echo was done and was normal    off of  nitroglycerin drip.  Started on  Imdur 30 mg p.o. daily  Cardiology notes reviewed   As per cardiology   it appears that he has fibromuscular dysplasia based on the CT scan findings of the carotid arteries.  He is interested in following up with cardiac genetics and vascular cardiology for further discussion of this issue.    He will need to remain in the hospital tomorrow for monitoring  and then possible discharge tomorrow     Acute onset visual changes following coronary angiography;  Ruled out for acute stroke with negative MRI  Patient had these vision changes chronically happens every few months related to his underlying glaucoma of the eyes as per the patient.  Patient had these vision changes at the end of his coronary angiogram  House provider seen him for an RRT.  Appreciate their help  Patient underwent CT head which which was negative CTA head and neck was negative  except for fibromuscular dysplasia of the blood vessels with intermittent narrowing  MRI of the brain was done which showed diffuse stable volume loss and cerebral white matter changes consistent with chronic small vessel ischemic disease no evidence for acute intracranial pathology    Patient's symptoms much improved today  Recommended outpatient follow-up with ophthalmology and general NEurology     2.  History of hyperlipidemia  LDL at 105  On Lipitor 40 mg p.o. daily      3.  BPH, on Flomax.  I will continue with that.  4.  History of anemia and hemorrhoidal bleeding:  He is status post hemorrhoidectomy.  No more bleeding.  He had a colonoscopy and EGD in October.  No ulcers noted.  Other than hemorrhoids, the colonoscopy was otherwise negative.  Hemoglobin improved to 12.6.      He is currently getting IV iron transfusions.  At this point, I do not think he needs any more transfusions as his hemoglobin improved to 12.6.  Hemoglobin at 11.6 on 1/5/2023 recheck tomorrow    5.  History of IgA nephropathy:  He is followed by Nephrology, it is borderline, is just keeping an eye, not on any medication at this time.  Creatinine is stable at 1.09   6.  Obstructive sleep apnea, on CPAP.  We will continue CPAP while he is in the hospital at home settings.  7.  DVT prophylaxis; PCDs and ambulation     Constipation;   Bowel regimen started     CODE STATUS:  FULL CODE.         Disposition: Expected discharge most likely tomorrow   Discussed with bedside RN and patient today  Cardiology notes reviewed.     Greater than 55 minutes were spent in taking care of this patient today and reviewing the chart, counseling and counseling the patient and discussion with bedside RN    Shirley Tompkins MD, MD  [phone] (P)      Interval History   Patient sitting comfortably in bed.  Denies any chest pain or shortness of breath currently.  Vision changes improved.   No other acute issues since yesterday.    -Data reviewed today: I  reviewed all new labs and imaging results over the last 24 hours. I personally reviewed no imaging today   Physical Exam   Temp: 98.1  F (36.7  C) Temp src: Oral BP: 122/72 Pulse: 65   Resp: 18 SpO2: 99 % O2 Device: None (Room air)    Vitals:    01/04/23 1131 01/04/23 1319 01/05/23 0640   Weight: 70.8 kg (156 lb) 68.2 kg (150 lb 6.4 oz) 68.9 kg (152 lb)     Vital Signs with Ranges  Temp:  [97.8  F (36.6  C)-98.1  F (36.7  C)] 98.1  F (36.7  C)  Pulse:  [56-66] 65  Resp:  [18] 18  BP: ()/(58-82) 122/72  SpO2:  [96 %-99 %] 99 %  I/O last 3 completed shifts:  In: 960 [P.O.:960]  Out: 300 [Urine:300]    Constitutional: Awake, alert, cooperative, no apparent distress  Respiratory: Clear to auscultation bilaterally, no crackles or wheezing  Cardiovascular: Regular rate and rhythm, normal S1 and S2, and no murmur noted  GI: Normal bowel sounds, soft, non-distended, non-tender  Skin/Integumen: No rashes, no cyanosis, no edema  Other:     Medications     [Held by provider] nitroGLYcerin 50 mg in D5W 250 mL Stopped (01/05/23 1700)     Percutaneous Coronary Intervention orders placed (this is information for BPA alerting)       ACE/ARB/ARNI NOT PRESCRIBED       STATIN NOT PRESCRIBED         aspirin  81 mg Oral Daily     atorvastatin  40 mg Oral Daily     clopidogrel  75 mg Oral Daily     isosorbide mononitrate  30 mg Oral Daily     latanoprostene bunod  1 drop Both Eyes At Bedtime     magnesium oxide  400 mg Oral Daily     metoprolol tartrate  25 mg Oral BID     psyllium  1 packet Oral Daily     sodium chloride (PF)  3 mL Intracatheter Q8H     tamsulosin  0.4 mg Oral Daily       Data   Recent Labs   Lab 01/06/23  0734 01/04/23  1926 01/04/23  1631 01/04/23  1153   WBC 5.7 5.9  --  5.4   HGB 12.4* 11.6*  --  12.6*   MCV 80 79  --  81    188  --  201     --   --  139   POTASSIUM 4.0  --   --  4.0   CHLORIDE 105  --   --  105   CO2 26  --   --  27   BUN 20  --   --  25   CR 1.08  --   --  1.09   ANIONGAP 8  --    --  7   SONA 9.3  --   --  9.0   GLC 96  --  84 99   ALBUMIN  --   --   --  3.4   PROTTOTAL  --   --   --  7.6   BILITOTAL  --   --   --  0.4   ALKPHOS  --   --   --  66   ALT  --   --   --  31   AST  --   --   --  27       Recent Results (from the past 24 hour(s))   Echocardiogram Limited   Result Value    LVEF  55-60%    Narrative    [id number]  [id number]  [id number]  [id number]^LEONEL^JUAN JOSE^OLEGARIO PRITCHARD     [facility]  Echocardiography Laboratory  [address] [place] [address], [place] [address]     Name: ELENA COWART  MRN: [id number]  : 1955  Study Date: 2023 01:56 PM  Age: 67 yrs  Gender: Male  Patient Location: [facility]  Reason For Study: MI - Acute  Ordering Physician: JUAN JOSE CASTANEDA  Referring Physician: JUAN JOSE CASTANEDA  Performed By: Larissa Torrez     BSA: 1.8 m2  Height: 69 in  Weight: 152 lb  HR: 56  BP: 132/82 mmHg  ______________________________________________________________________________  Procedure  Limited Echo Adult.  ______________________________________________________________________________  Interpretation Summary     The visual ejection fraction is 55-60%.  Normal left ventricular wall motion  There is mild (1+) aortic regurgitation.  ______________________________________________________________________________  Left Ventricle  The left ventricle is normal in size. There is normal left ventricular wall  thickness. Left ventricular systolic function is normal. The visual ejection  fraction is 55-60%. Left ventricular diastolic function is normal. Normal left  ventricular wall motion. No evidence of left ventricular mass or tumors.     Right Ventricle  The right ventricle is normal in structure, function and size.     Atria  Normal left atrial size. Right atrial size is normal.     Mitral Valve  The mitral valve leaflets appear normal. There is no evidence of stenosis,  fluttering, or prolapse.     Tricuspid Valve  Normal tricuspid valve. There is trace  tricuspid regurgitation. Right  ventricular systolic pressure is normal.     Aortic Valve  There is trivial trileaflet aortic sclerosis. There is mild (1+) aortic  regurgitation.     Pulmonic Valve  Normal pulmonic valve.     Vessels  The aortic root is normal size. The inferior vena cava is normal.     Pericardium  The pericardium appears normal.     Rhythm  Sinus rhythm was noted.  ______________________________________________________________________________  MMode/2D Measurements & Calculations  asc Aorta Diam: 3.8 cm     LVOT diam: 2.0 cm  LVOT area: 3.3 cm2     Doppler Measurements & Calculations  MV E max hiren: 86.3 cm/sec  MV A max hiren: 50.4 cm/sec  MV E/A: 1.7  MV dec slope: 628.7 cm/sec2  MV dec time: 0.14 sec  Ao V2 max: 171.9 cm/sec  Ao max P.0 mmHg  Ao V2 mean: 121.6 cm/sec  Ao mean P.4 mmHg  Ao V2 VTI: 35.5 cm  OTONIEL(I,D): 2.3 cm2  OTONIEL(V,D): 2.2 cm2  AI P1/2t: 787.6 msec  LV V1 max P.5 mmHg  LV V1 max: 116.9 cm/sec  LV V1 VTI: 25.1 cm  SV(LVOT): 81.7 ml  SI(LVOT): 44.5 ml/m2  AV Hiren Ratio (DI): 0.68  OTONIEL Index (cm2/m2): 1.3  Lateral E/e': 8.7     ______________________________________________________________________________  Report approved by: Lindsey Mulligan 2023 02:32 PM

## 2023-01-06 NOTE — PROGRESS NOTES
Assessment and Plan:     Raúl Marie is a 67 year old male with past medical history significant for HLD, IgA nephropathy, recent severe GI bleed with recent hemorhoidectomy admitted on 1/4/2023 with chest pain and + trop.      Assessment:  1.  NSTEMI secondary to SCAD with underlying CAD.  Spontaneous coronary dissection (SCAD) of diagonal, OM and RPAV/RPL branches.              - review of recent literature on Spontaneous Coronary Artery Dissection: [facility] State-of-the-Art Review J Am Josué Cardiol. 2020 Aug, [phone] recommends minimum DAPT for 2-4 wks then asa for for 3-12 months.  Discussed as a team, with NSTEMI and underlying CAD will treat with dapt x 3 months and aspirin 81 mg daily indefinitely.               -No arrhythmia on telemetry.     2.  Recent severe GI bleed requiring 4 units of blood transfusion now status post hemorrhoidectomy.  The patient has gotten multiple IV iron infusions with normalization of his anemia.  No bleeding seen during this hospitalization.     3.  Dyslipidemia, , hdl 47, tot chol 164- started on lipitor 40       4.  IgA nephropathy, with baseline creatinine about 1.1-1.2.  In his normal range at 1.1 today.     5.  Fibromuscular dysplasia noted on CT of head/ neck noted in the carotids without significant stenosis, will need outpt CTA of renals              - we had a long discussion of known and unknown genetic mutations. Will refer to genetic counseling as outpt     6.  Polypharmacy with pt following with a naturopath for adrenal fatigue, mental fogginess etc.  Request pt give us a list of supplements and pharm to review for interactions.     Recommendations:    He is doing very well, with minimal residual, vague, continuous, discomfort throughout the chest which does not increase with activity during cardiac rehab.  He walked for about 15 minutes and did some stairs and felt well.    His blood pressure and heart rate are under excellent control and he  is tolerating dual antiplatelet therapy without any recurrence of GI bleeding.   As discussed above, I would continue Plavix for 3 months and aspirin indefinitely.    We will set up follow-up with outpatient cardiac genetics and vascular cardiology.  I recommended that he not lift more than 20 pounds and avoid strenuous activity.  I would suggest that he take a week or so to recover, but he is concerned about the financial strain, and after that he can continue to do light work as long as he avoids lifting, pushing, and pulling.    Okay for discharge tomorrow if he is feeling well without any increase in symptoms.  Cardiology will sign off, but please contact us if there are any concerns or questions about his discharge or if he has any further symptoms or problems while he is here.       SUMANTH MONTENEGRO MD        Interval History:   Minimal residual chest heaviness that does not increase in severity with activity.  No shortness of breath lightheadedness or other symptoms.    aspirin  81 mg Oral Daily     atorvastatin  40 mg Oral Daily     clopidogrel  75 mg Oral Daily     isosorbide mononitrate  30 mg Oral Daily     latanoprostene bunod  1 drop Both Eyes At Bedtime     magnesium oxide  400 mg Oral Daily     metoprolol tartrate  25 mg Oral BID     psyllium  1 packet Oral Daily     sodium chloride (PF)  3 mL Intracatheter Q8H     tamsulosin  0.4 mg Oral Daily             Medications:       aspirin  81 mg Oral Daily     atorvastatin  40 mg Oral Daily     clopidogrel  75 mg Oral Daily     isosorbide mononitrate  30 mg Oral Daily     latanoprostene bunod  1 drop Both Eyes At Bedtime     magnesium oxide  400 mg Oral Daily     metoprolol tartrate  25 mg Oral BID     psyllium  1 packet Oral Daily     sodium chloride (PF)  3 mL Intracatheter Q8H     tamsulosin  0.4 mg Oral Daily            Physical Exam:   Blood pressure 122/72, pulse 65, temperature 98.1  F (36.7  C), temperature source Oral, resp. rate 18, height 1.753  "m (5' 9\"), weight 68.9 kg (152 lb), SpO2 99 %.    Vitals:    01/04/23 1131 01/04/23 1319 01/05/23 0640   Weight: 70.8 kg (156 lb) 68.2 kg (150 lb 6.4 oz) 68.9 kg (152 lb)         Intake/Output Summary (Last 24 hours) at 1/6/2023 1456  Last data filed at 1/5/2023 2034  Gross per 24 hour   Intake 480 ml   Output --   Net 480 ml       01/01 0700 - 01/06 0659  In: 1300 [P.O.:1300]  Out: 1300 [Urine:1300]  Net: 0    Exam:  GENERAL APPEARANCE ADULT: Alert, in no acute distress  RESP: lungs clear to auscultation   CV: regular rate and rhythm, no murmur, rub, or gallop  ABDOMEN: normal bowel sounds, soft, nontender, no hepatosplenomegaly or other masses  EXTREMITIES: No edema         Data:   LABS (Last four results)  CMP  Recent Labs   Lab 01/06/23  0734 01/05/23  1633 01/04/23  1631 01/04/23  1153     --   --  139   POTASSIUM 4.0  --   --  4.0   CHLORIDE 105  --   --  105   CO2 26  --   --  27   ANIONGAP 8  --   --  7   GLC 96  --  84 99   BUN 20  --   --  25   CR 1.08  --   --  1.09   GFRESTIMATED 75  --   --  74   SONA 9.3  --   --  9.0   MAG  --  2.0  --   --    PROTTOTAL  --   --   --  7.6   ALBUMIN  --   --   --  3.4   BILITOTAL  --   --   --  0.4   ALKPHOS  --   --   --  66   AST  --   --   --  27   ALT  --   --   --  31     CBC  Recent Labs   Lab 01/06/23  0734 01/04/23  1926 01/04/23  1153   WBC 5.7 5.9 5.4   RBC 4.83 4.51 4.86   HGB 12.4* 11.6* 12.6*   HCT 38.5* 35.4* 39.2*   MCV 80 79 81   MCH 25.7* 25.7* 25.9*   MCHC 32.2 32.8 32.1   RDW 23.3* 23.2* 23.4*    188 201     INRNo lab results found in last 7 days.  TROPONINS   Lab Results   Component Value Date    TROPI <0.012 01/11/2011                                                                                                               SUMANTH MONTENEGRO MD  "

## 2023-01-06 NOTE — PLAN OF CARE
Goal Outcome Evaluation:    VSS. Monitor remains Sinus rhythm. Heparin and NTG drip stopped. Tylenol given for headache. States mild chest discomfort rates 1/10. Continue to monitor.

## 2023-01-06 NOTE — PLAN OF CARE
Pt stable over night, VSS, no c/o Chest pain or sob.  Pt c/o a HA which was treated with PRN meds with good results.  Pt slept well and will likely discharge tomorrow.  No vision changes noted.  Right radial site is c/d/I post Angio.

## 2023-01-06 NOTE — PLAN OF CARE
Goal Outcome Evaluation:    Pt denies chest pain or SOB, endorses HA. Mild relief with tylenol, but will take oxycodone at bedtime. Tele SB, Pt denies any vision changes, Possible discharge home tomorrow.

## 2023-01-07 ENCOUNTER — APPOINTMENT (OUTPATIENT)
Dept: OCCUPATIONAL THERAPY | Facility: CLINIC | Age: 68
DRG: 280 | End: 2023-01-07
Payer: MEDICARE

## 2023-01-07 VITALS
SYSTOLIC BLOOD PRESSURE: 125 MMHG | RESPIRATION RATE: 18 BRPM | BODY MASS INDEX: 22.38 KG/M2 | DIASTOLIC BLOOD PRESSURE: 70 MMHG | TEMPERATURE: 98.2 F | HEART RATE: 69 BPM | HEIGHT: 69 IN | WEIGHT: 151.1 LBS | OXYGEN SATURATION: 97 %

## 2023-01-07 PROCEDURE — 99239 HOSP IP/OBS DSCHRG MGMT >30: CPT | Performed by: INTERNAL MEDICINE

## 2023-01-07 PROCEDURE — 97110 THERAPEUTIC EXERCISES: CPT | Mod: GO | Performed by: OCCUPATIONAL THERAPIST

## 2023-01-07 PROCEDURE — 250N000013 HC RX MED GY IP 250 OP 250 PS 637: Performed by: INTERNAL MEDICINE

## 2023-01-07 PROCEDURE — 250N000013 HC RX MED GY IP 250 OP 250 PS 637: Performed by: PHYSICIAN ASSISTANT

## 2023-01-07 PROCEDURE — 250N000013 HC RX MED GY IP 250 OP 250 PS 637: Performed by: STUDENT IN AN ORGANIZED HEALTH CARE EDUCATION/TRAINING PROGRAM

## 2023-01-07 RX ORDER — METOPROLOL TARTRATE 25 MG/1
12.5 TABLET, FILM COATED ORAL 2 TIMES DAILY
Qty: 60 TABLET | Refills: 0 | Status: SHIPPED | OUTPATIENT
Start: 2023-01-07 | End: 2023-01-17

## 2023-01-07 RX ORDER — POLYETHYLENE GLYCOL 3350 17 G/17G
17 POWDER, FOR SOLUTION ORAL DAILY
Qty: 510 G | Refills: 0 | Status: SHIPPED | OUTPATIENT
Start: 2023-01-07 | End: 2023-03-21

## 2023-01-07 RX ORDER — ISOSORBIDE MONONITRATE 30 MG/1
30 TABLET, EXTENDED RELEASE ORAL DAILY
Qty: 90 TABLET | Refills: 0 | Status: SHIPPED | OUTPATIENT
Start: 2023-01-07 | End: 2023-01-17

## 2023-01-07 RX ORDER — SENNA AND DOCUSATE SODIUM 50; 8.6 MG/1; MG/1
1-2 TABLET, FILM COATED ORAL AT BEDTIME
Qty: 90 TABLET | Refills: 0 | Status: SHIPPED | OUTPATIENT
Start: 2023-01-07 | End: 2023-03-21

## 2023-01-07 RX ADMIN — ATORVASTATIN CALCIUM 40 MG: 40 TABLET, FILM COATED ORAL at 08:39

## 2023-01-07 RX ADMIN — ISOSORBIDE MONONITRATE 30 MG: 30 TABLET, EXTENDED RELEASE ORAL at 08:40

## 2023-01-07 RX ADMIN — ASPIRIN 81 MG: 81 TABLET, COATED ORAL at 08:39

## 2023-01-07 RX ADMIN — TAMSULOSIN HYDROCHLORIDE 0.4 MG: 0.4 CAPSULE ORAL at 08:40

## 2023-01-07 RX ADMIN — CLOPIDOGREL BISULFATE 75 MG: 75 TABLET ORAL at 08:40

## 2023-01-07 ASSESSMENT — ACTIVITIES OF DAILY LIVING (ADL)
ADLS_ACUITY_SCORE: 19
ADLS_ACUITY_SCORE: 19
ADLS_ACUITY_SCORE: 18

## 2023-01-07 NOTE — PLAN OF CARE
Goal Outcome Evaluation:      Pt received discharge instructions and medications, questions answered. Will leave floor by wheelchair.

## 2023-01-07 NOTE — PLAN OF CARE
Occupational Therapy Discharge Summary    Reason for therapy discharge:    Discharged to home with outpatient therapy.    Progress towards therapy goal(s). See goals on Care Plan in [facility] electronic health record for goal details.  Goals partially met.  Barriers to achieving goals:   discharge from facility.    Therapy recommendation(s):    Continued therapy is recommended.  Rationale/Recommendations:  Recommend continued OP CR to increase endurance and knowledge of heart healthy behaviors.  Pt will be going to [facility] for Phase II CR.    Goal Outcome Evaluation:  Home with Phase II CR

## 2023-01-07 NOTE — DISCHARGE SUMMARY
[facility]  Discharge Summary        Raúl Marie MRN# [id number]   YOB: 1955 Age: 67 year old     Date of Admission:  1/4/2023  Date of Discharge:  1/7/2023  Admitting Physician:  Aris Velasco MD  Discharge Physician: Shirley Tompkins MD  Discharging Service: Hospitalist     Primary Provider: Meenu Gusman  Primary Care Physician Phone Number: [phone]         Discharge Diagnoses/Problem Oriented Hospital Course (Providers):    Raúl Marie was admitted on 1/4/2023 by Aris Velasco MD and I would refer you to their history and physical.  The following problems were addressed during his hospitalization:  Raúl Marie is a 67 year old male with past medical history significant for HLD, IgA nephropathy, recent severe GI bleed with recent hemorhoidectomy admitted on 1/4/2023 with chest pain and + trop.   .     ASSESSMENT AND PLAN:    1.  Non-ST elevation myocardial infarction: secondary to SCAD with underlying CAD.  Spontaneous coronary dissection (SCAD) of diagonal, OM and RPAV/RPL branches  Serial troponins [id number]  Cardiology consulted and are following.  Appreciate assistance.       Patient underwent coronary angiogram on 1/4/2023 which showed spontaneous coronary artery dissection with underlying coronary artery disease involving the diagonal, obtuse marginal, RPAV /RPL branches  Recommended aspirin and Plavix for at least 3 months.  Then aspirin indefinitely  Heparin drip stopped now   Echo was done and was normal    off of  nitroglycerin drip.  Started on  Imdur 30 mg p.o. daily  Cardiology notes reviewed   As per cardiology   it appears that he has fibromuscular dysplasia based on the CT scan findings of the carotid arteries.  He is interested in following up with cardiac genetics and vascular cardiology for further discussion of this issue.      Patient is doing well.  Chest pain-free worked well with cardiac rehab  Okay to discharge to home as  per cardiology    Patient is on multiple vitamin and herbal supplements and advised to hold them until he sees his naturopath physician as he is going to be on aspirin Plavix which increases risk of bleeding  Patient also met with pharmacist  in the hospital discussed with the pharmacist about his supplements   Acute onset visual changes following coronary angiography;  Ruled out for acute stroke with negative MRI  Patient had these vision changes chronically happens every few months related to his underlying glaucoma of the eyes as per the patient.  Patient had these vision changes at the end of his coronary angiogram  House provider seen him for an RRT.  Appreciate their help  Patient underwent CT head which which was negative CTA head and neck was negative except for fibromuscular dysplasia of the blood vessels with intermittent narrowing  MRI of the brain was done which showed diffuse stable volume loss and cerebral white matter changes consistent with chronic small vessel ischemic disease no evidence for acute intracranial pathology     Patient's symptoms much improved today  Recommended outpatient follow-up with ophthalmology and general NEurology      2.  History of hyperlipidemia  LDL at 105  On Lipitor 40 mg p.o. daily        3.  BPH, on Flomax.   Continued Flomax  4.  History of anemia and hemorrhoidal bleeding:  He is status post hemorrhoidectomy.  No more bleeding.  He had a colonoscopy and EGD in October.  No ulcers noted.  Other than hemorrhoids, the colonoscopy was otherwise negative.  Hemoglobin improved to 12.6.       He is currently getting IV iron transfusions.  At this point, I do not think he needs any more transfusions as his hemoglobin improved to 12.6.  Hemoglobin remained stable at 12.4 on 1/6/2023     5.  History of IgA nephropathy:  He is followed by Nephrology, it is borderline, is just keeping an eye, not on any medication at this time.  Creatinine is stable at 1.09 -1.08   6.   Obstructive sleep apnea, on CPAP.  We will continue CPAP while he is in the hospital at home settings.  7.  DVT prophylaxis; PCDs and ambulation      Constipation;   Bowel regimen started     CODE STATUS:  FULL CODE.           Disposition: Discharged home today in stable condition with family    Discussed with bedside RN and patient today    Shirley Tompkins MD   Page [phone](7AM-6PM)            Code Status:      Full Code        Brief Hospital Stay Summary Sent Home With Patient in AVS:        Reason for your hospital stay      NONSTEMI                 Important Results:      Please see below        Pending Results:        Unresulted Labs Ordered in the Past 30 Days of this Admission     No orders found from 12/5/2022 to 1/5/2023.            Discharge Instructions and Follow-Up:      Follow-up Appointments     Follow-up and recommended labs and tests       F/u with cardiology in 1-2weeks               Discharge Disposition:      Discharged to home        Discharge Medications:        Current Discharge Medication List      START taking these medications    Details   aspirin (ASA) 81 MG EC tablet Take 1 tablet (81 mg) by mouth daily  Qty: 100 tablet, Refills: 0    Associated Diagnoses: Spontaneous dissection of coronary artery      atorvastatin (LIPITOR) 40 MG tablet Take 1 tablet (40 mg) by mouth daily  Qty: 90 tablet, Refills: 0    Associated Diagnoses: Non-STEMI (non-ST elevated myocardial infarction) (H)      clopidogrel (PLAVIX) 75 MG tablet Take 1 tablet (75 mg) by mouth daily  Qty: 90 tablet, Refills: 0    Associated Diagnoses: Non-STEMI (non-ST elevated myocardial infarction) (H)      isosorbide mononitrate (IMDUR) 30 MG 24 hr tablet Take 1 tablet (30 mg) by mouth daily  Qty: 90 tablet, Refills: 0    Associated Diagnoses: Spontaneous dissection of coronary artery      metoprolol tartrate (LOPRESSOR) 25 MG tablet Take 0.5 tablets (12.5 mg) by mouth 2 times daily  Qty: 60 tablet, Refills: 0    Associated  "Diagnoses: Non-STEMI (non-ST elevated myocardial infarction) (H)      nitroGLYcerin (NITROSTAT) 0.4 MG sublingual tablet For chest pain place 1 tablet under the tongue every 5 minutes for 3 doses. If symptoms persist 5 minutes after 1st dose call 911.  Qty: 15 tablet, Refills: 0    Associated Diagnoses: Non-STEMI (non-ST elevated myocardial infarction) (H)      polyethylene glycol (MIRALAX) 17 GM/Dose powder Take 17 g by mouth daily  Qty: 510 g, Refills: 0    Associated Diagnoses: Spontaneous dissection of coronary artery      SENNA-docusate sodium (SENNA S) 8.6-50 MG tablet Take 1-2 tablets by mouth At Bedtime  Qty: 90 tablet, Refills: 0    Associated Diagnoses: Spontaneous dissection of coronary artery         CONTINUE these medications which have NOT CHANGED    Details   latanoprostene bunod (VYZULTA) 0.024 % SOLN ophthalmic solution Place 1 drop into both eyes At Bedtime      psyllium (METAMUCIL/KONSYL) Packet Take 1 packet by mouth daily  Qty: 30 packet, Refills: 0    Associated Diagnoses: Anemia, unspecified type; Rectal bleeding      tamsulosin (FLOMAX) 0.4 MG capsule Take 0.4 mg by mouth daily         STOP taking these medications       NONFORMULARY Comments:   Reason for Stopping:                 Allergies:         Allergies   Allergen Reactions     Chlorpheniramine-Phenylephrine Itching and Other (See Comments)     Other reaction(s): Other (See Comments)     Walnuts [Nuts]      Wheat Extract            Consultations This Hospital Stay:      Consultation during this admission received from cardiology, nutrition, cardiac rehab        Condition and Physical on Discharge:      Discharge condition: Stable   Vitals: Blood pressure 125/70, pulse 69, temperature 98.2  F (36.8  C), temperature source Oral, resp. rate 18, height 1.753 m (5' 9\"), weight 68.5 kg (151 lb 1.6 oz), SpO2 97 %.     Constitutional:  Alert awake, not in acute distress   Lungs:  Clear to auscultation bilaterally   Cardiovascular:  Normal rate " rhythm regular, no murmurs   Abdomen:  Soft, nontender, nondistended, no hepatosplenomegaly   Skin:  Warm and dry   Other:          Discharge Time:      Greater than 30 minutes.        Image Results From This Hospital Stay (For Non-EPIC Providers):        Results for orders placed or performed during the hospital encounter of 01/04/23   Chest XR,  PA & LAT    Narrative    XR CHEST 2 VIEWS 1/4/2023 1:53 PM    HISTORY: chest pain    COMPARISON: 12/28/2022      Impression    IMPRESSION: No infiltrate, pleural effusion or pneumothorax. The  cardiac and mediastinal silhouettes are normal.    PARMJIT BENAVIDES MD         SYSTEM ID:  [id number]   CT Head w/o Contrast    Narrative    EXAM: CT HEAD W/O CONTRAST  LOCATION: [facility]  DATE/TIME: 1/4/2023 5:53 PM    INDICATION: Visual changes post coronary angiography.    COMPARISON: None.    TECHNIQUE: Routine CT head without IV contrast. Multiplanar reformats. Dose reduction techniques were used.    FINDINGS:  INTRACRANIAL CONTENTS: No intracranial hemorrhage, extra-axial collection, or mass effect. No CT evidence of acute infarct. Normal parenchymal attenuation. Mild generalized cerebral volume loss. No hydrocephalus. Scattered intracranial atherosclerotic   calcifications.    VISUALIZED ORBITS/SINUSES/MASTOIDS: No intraorbital abnormality. No paranasal sinus mucosal disease. No middle ear or mastoid effusion. Presumed cerumen in the right external auditory canal.    BONES/SOFT TISSUES: No acute abnormality.      Impression    IMPRESSION: No CT findings of an acute intracranial process.     CTA Head Neck with Contrast    Narrative    EXAM: CTA HEAD NECK W CONTRAST  LOCATION: [facility]  DATE/TIME: 1/4/2023 5:55 PM    INDICATION: Visual changes post coronary angiography.  COMPARISON: CT head same day.  CONTRAST: 75 mL Isovue 370.  TECHNIQUE: Head and neck CT angiogram with IV contrast. Axial helical CT images of the head and  neck vessels obtained during the arterial phase of intravenous contrast administration. Axial 2D reconstructed images and multiplanar 3D MIP reconstructed   images of the head and neck vessels were performed by the technologist. Dose reduction techniques were used. All stenosis measurements made according to NASCET criteria unless otherwise specified.    FINDINGS:   HEAD CTA:  ANTERIOR CIRCULATION: Mild nonflow limiting atherosclerosis of the carotid siphons. No high-grade proximal arterial stenosis/occlusion, aneurysm, or high flow vascular malformation. Standard [place] anatomy.    POSTERIOR CIRCULATION: No high-grade proximal arterial stenosis/occlusion, aneurysm, or high flow vascular malformation. Balanced vertebral arteries supply a normal basilar artery.     DURAL VENOUS SINUSES: Expected enhancement of the major dural venous sinuses.    NECK CTA:  CAROTID ARTERIES: The bilateral common carotid arteries appear widely patent. Minimal atherosclerosis without stenosis involving the right carotid bifurcation. Segmental beaded appearing luminal irregularity with alternating mild stenoses and fusiform   areas of dilatation involving the mid to distal cervical right internal carotid artery with ICA lumen measuring up to 9-10 mm in diameter in the axial plane (series 9 image 344). Tortuosity of the distal cervical right ICA. There is also segmental   beaded-appearing luminal irregularity with alternating mild stenoses and areas of fusiform dilatation involving the mid to distal cervical left internal carotid artery with tortuosity, again measuring up to 9-10 mm in diameter. Findings are concerning   for bilateral internal carotid artery fibromuscular dysplasia.    VERTEBRAL ARTERIES: No focal stenosis or dissection. Balanced vertebral arteries.    AORTIC ARCH: Bovine origin left common carotid artery. No significant stenosis at the origin of the great vessels.    NONVASCULAR STRUCTURES: Multilevel  degenerative changes are noted in the cervical spine. This results in varying degrees of spinal canal and neural foraminal stenosis.      Impression    IMPRESSION:     HEAD CTA:   1.  No high-grade stenosis or large vessel occlusion involving the major intracranial arteries.  2.  No intracranial aneurysm or high flow vascular malformation is identified.    NECK CTA:  1.  Segmental luminal irregularity and tortuosity with alternating areas of mild stenosis and fusiform dilatation involving the mid to distal cervical internal carotid arteries bilaterally, concerning for fibromuscular dysplasia.  2.  Patent cervical vertebral arteries.   MR Brain w/o & w Contrast    Narrative    MRI OF THE BRAIN WITHOUT AND WITH CONTRAST 1/5/2023 8:37 AM     COMPARISON: Head CT 1/4/2023.    HISTORY: Headache. Neurologic deficit, non-traumatic. Visual symptoms.  Neurologic deficit onset </= 24 hours. No known/automatically detected  potential contraindications to iodinated contrast.    TECHNIQUE: Axial diffusion-weighted with ADC map, axial T2-weighted  with fat saturation, axial T1-weighted, axial turboFLAIR and coronal  T1-weighted images of the brain were acquired without intravenous  contrast.  Following intravenous administration of gadolinium (7mL  Gadavist), axial T1-weighted images of the brain were acquired.     FINDINGS: There is minimal diffuse cerebral volume loss. There are a  few tiny scattered focal areas of abnormal T2 signal hyperintensity in  the cerebral white matter bilaterally that are consistent with sequela  of chronic small vessel ischemic disease.    The ventricles and basal cisterns are within normal limits in  configuration given the degree of cerebral volume loss. There is no  midline shift. There are no extra-axial fluid collections. There is no  evidence for stroke or acute intracranial hemorrhage. There is no  abnormal contrast enhancement in the brain or its coverings.    There is no sinusitis or  mastoiditis.      Impression    IMPRESSION: Diffuse cerebral volume loss and cerebral white matter  changes consistent with chronic small vessel ischemic disease. No  evidence for acute intracranial pathology.    ISABEL NJ MD         SYSTEM ID:  [id number]   Echocardiogram Limited     Value    LVEF  55-60%    Narrative    [id number]  [id number]  [id number]  [id number]^LEONEL^JUAN JOSE^OLEGARIO PRITCHARD     [facility]  Echocardiography Laboratory  [address] [place] [address]     Name: ELENA COWART  MRN: [id number]  : 1955  Study Date: 2023 01:56 PM  Age: 67 yrs  Gender: Male  Patient Location: [facility]  Reason For Study: MI - Acute  Ordering Physician: JUAN JOSE CASTANEDA  Referring Physician: JUAN JOSE CASTANEDA  Performed By: Larissa Torrez     BSA: 1.8 m2  Height: 69 in  Weight: 152 lb  HR: 56  BP: 132/82 mmHg  ______________________________________________________________________________  Procedure  Limited Echo Adult.  ______________________________________________________________________________  Interpretation Summary     The visual ejection fraction is 55-60%.  Normal left ventricular wall motion  There is mild (1+) aortic regurgitation.  ______________________________________________________________________________  Left Ventricle  The left ventricle is normal in size. There is normal left ventricular wall  thickness. Left ventricular systolic function is normal. The visual ejection  fraction is 55-60%. Left ventricular diastolic function is normal. Normal left  ventricular wall motion. No evidence of left ventricular mass or tumors.     Right Ventricle  The right ventricle is normal in structure, function and size.     Atria  Normal left atrial size. Right atrial size is normal.     Mitral Valve  The mitral valve leaflets appear normal. There is no evidence of stenosis,  fluttering, or prolapse.     Tricuspid Valve  Normal tricuspid valve. There is trace tricuspid  regurgitation. Right  ventricular systolic pressure is normal.     Aortic Valve  There is trivial trileaflet aortic sclerosis. There is mild (1+) aortic  regurgitation.     Pulmonic Valve  Normal pulmonic valve.     Vessels  The aortic root is normal size. The inferior vena cava is normal.     Pericardium  The pericardium appears normal.     Rhythm  Sinus rhythm was noted.  ______________________________________________________________________________  MMode/2D Measurements & Calculations  asc Aorta Diam: 3.8 cm     LVOT diam: 2.0 cm  LVOT area: 3.3 cm2     Doppler Measurements & Calculations  MV E max hiren: 86.3 cm/sec  MV A max hiren: 50.4 cm/sec  MV E/A: 1.7  MV dec slope: 628.7 cm/sec2  MV dec time: 0.14 sec  Ao V2 max: 171.9 cm/sec  Ao max P.0 mmHg  Ao V2 mean: 121.6 cm/sec  Ao mean P.4 mmHg  Ao V2 VTI: 35.5 cm  OTONIEL(I,D): 2.3 cm2  OTONIEL(V,D): 2.2 cm2  AI P1/2t: 787.6 msec  LV V1 max P.5 mmHg  LV V1 max: 116.9 cm/sec  LV V1 VTI: 25.1 cm  SV(LVOT): 81.7 ml  SI(LVOT): 44.5 ml/m2  AV Hiren Ratio (DI): 0.68  OTONIEL Index (cm2/m2): 1.3  Lateral E/e': 8.7     ______________________________________________________________________________  Report approved by: Lindsey Mulligan 2023 02:32 PM         Cardiac Catheterization    Narrative    1.  Spontaneous coronary dissection (SCAD) of diagonal, OM and RPAV/RPL   branches. Given hemodynamic stability and relatively small size of the   involved vessels, no intervention was performed.            Most Recent Lab Results In EPIC (For Non-EPIC Providers):    Most Recent 3 CBC's:  Recent Labs   Lab Test 23  0734 23  1926 23  1153   WBC 5.7 5.9 5.4   HGB 12.4* 11.6* 12.6*   MCV 80 79 81    188 201      Most Recent 3 BMP's:  Recent Labs   Lab Test 23  0734 23  1631 23  1153 22  2343     --  139 138   POTASSIUM 4.0  --  4.0 3.9   CHLORIDE 105  --  105 103   CO2 26  --  27 29   BUN 20  --  25 23   CR 1.08  --  1.09 1.41*    ANIONGAP 8  --  7 6   SONA 9.3  --  9.0 9.2   GLC 96 84 99 110*     Most Recent 3 Troponin's:No lab results found.    Invalid input(s): TROP, TROPONINIES  Most Recent 3 INR's:  Recent Labs   Lab Test 10/13/22  1453 10/05/22  1802   INR 1.11 1.10     Most Recent 2 LFT's:  Recent Labs   Lab Test 01/04/23  1153 12/27/22  2343   AST 27 30   ALT 31 35   ALKPHOS 66 75   BILITOTAL 0.4 0.4     Most Recent Cholesterol Panel:  Recent Labs   Lab Test 01/04/23  1153   CHOL 164   *   HDL 47   TRIG 61     Most Recent 6 Bacteria Isolates From Any Culture (See EPIC Reports for Culture Details):No lab results found.  Most Recent TSH, T4 and HgbA1c:   Recent Labs   Lab Test 01/04/23  1153   A1C 4.9

## 2023-01-07 NOTE — PLAN OF CARE
Goal Outcome Evaluation:        [id number]    A&O x 4. Pt reported HA- requested PRN tylenol & oxycodone tonight. Asked to not be disturbed- promoted sleep. Denies visual changes. VSS, on RA. Up indep in room. Tele SR. Alarms on. Plan for discharge 1/7. Continue to monitor.

## 2023-01-09 ENCOUNTER — TELEPHONE (OUTPATIENT)
Dept: CARDIOLOGY | Facility: CLINIC | Age: 68
End: 2023-01-09

## 2023-01-09 NOTE — TELEPHONE ENCOUNTER
I clarified with  and he said pt needs to wait at least 6 weeks for any dental work. I called pt with an update and he said he will reschedule. Karli PERKINS January 9, 2023, 9:21 AM

## 2023-01-09 NOTE — TELEPHONE ENCOUNTER
1/9/2023 Plan outlined in Trena Addison PA-C hospital note dated 1/5/2023 - 11. We will arrange for early follow-up in 1 week and again in 1 month.  Return message to scheduling to arrange visits.  Mee Hernández RN 01/09/23 10:33 AM

## 2023-01-09 NOTE — TELEPHONE ENCOUNTER
I called pt to discuss his dental work. Pt is s/p NSTEMI secondary to SCAD with underlying CAD. He was admitted 1/4-1/7/23. Pt said that he has a dental cleaning at 1:00 today. I told him typically it is advised pt wait 4-6 weeks post cath to have any dental work done. Pt said that portion was crossed off on this discharge instructions by someone and he isn't sure why. Pt said he is ok with rescheduling, but it's difficult to get in at his dentist so he would like to have today if possible. I told him I will message  to clarify as I am not sure why the dental instructions on his discharge summary were crossed out. Karli PERKINS January 9, 2023, 9:18 AM

## 2023-01-09 NOTE — TELEPHONE ENCOUNTER
[facility] Call Center    Phone Message    May a detailed message be left on voicemail: yes     Reason for Call: Other: RE: Dental work today / post hospital. Patient saw Dr Muniz at  on 1/4-1/7.    He has a dental procedure today and would like to discuss before going. His appointment is at 1:00pm.    He will be setting up his follow up appointment.    Action Taken: Other: Cardiology    Travel Screening: Not Applicable

## 2023-01-09 NOTE — TELEPHONE ENCOUNTER
[facility] Call Center    Phone Message    May a detailed message be left on voicemail: yes     Reason for Call: Appointment Intake    DAVIS: Two appt blocks needed with FLO    Referring Provider Name: Dr Muniz    Diagnosis and/or Symptoms: Non-STEMI (non-ST elevated myocardial infarction) (H) [I21.4]  Fibromuscular dysplasia of carotid artery (H) [I77.3]  Spontaneous dissection of coronary artery [I25.42]    Hospital follow up.      Action Taken: Other: Cardiology    Travel Screening: Not Applicable

## 2023-01-10 ENCOUNTER — PATIENT OUTREACH (OUTPATIENT)
Dept: CARE COORDINATION | Facility: CLINIC | Age: 68
End: 2023-01-10

## 2023-01-10 ENCOUNTER — TELEPHONE (OUTPATIENT)
Dept: CARDIOLOGY | Facility: CLINIC | Age: 68
End: 2023-01-10

## 2023-01-10 NOTE — PROGRESS NOTES
Clinic Care Coordination Contact  [facility]: Post-Discharge Note  SITUATION                                                      Admission:    Admission Date: 01/04/23   Reason for Admission: NONSTEMI  Discharge:   Discharge Date: 01/07/23  Discharge Diagnosis: NONSTEMI    BACKGROUND                                                      Per hospital discharge summary and inpatient provider notes:    This is a 67-year-old male with history of sleep apnea, on CPAP, hyperlipidemia, diet-controlled, BPH, history of borderline IgA nephropathy, history of hemorrhoidal bleeding, status post hemorrhoidectomy, history of been anemia, comes to the hospital with complaint of off and on chest pain for 1-1/2 weeks.    ASSESSMENT           Discharge Assessment  How are you doing now that you are home?: I am doing pretty well. I am starting work today but I have been taking it easy and doing my exercises. I have felt heart pain a tiny bit and I will Cardiology tomorrow if things do not improve.  How are your symptoms? (Red Flag symptoms escalate to triage hotline per guidelines): Improved  Do you feel your condition is stable enough to be safe at home until your provider visit?: Yes  Does the patient have their discharge instructions? : Yes  Does the patient have questions regarding their discharge instructions? : No  Were you started on any new medications or were there changes to any of your previous medications? : Yes  Does the patient have all of their medications?: Yes  Do you have questions regarding any of your medications? : No  Discharge follow-up appointment scheduled within 14 calendar days? : Yes  Discharge Follow Up Appointment Date: 01/20/23  Discharge Follow Up Appointment Scheduled with?: Specialty Care Provider    Post-op ([facility] CTA Only)  If the patient had a surgery or procedure, do they have any questions for a nurse?: No             PLAN                                                      Outpatient  Plan: F/u with cardiology in 1-2weeks    Future Appointments   Date Time Provider Department Center   1/19/2023  9:30 AM 3, [facility]   1/20/2023  1:00 PM [facility]   2/14/2023 11:00 AM Pao Thomas [facility]   2/14/2023 12:15 PM  LAB [facility]   2/20/2023  2:00 PM [facility]         For any urgent concerns, please contact our 24 hour nurse triage line: [phone] ([phone])         CHAD Thomas  [phone]  Connected Care Resource [facility]

## 2023-01-10 NOTE — TELEPHONE ENCOUNTER
"Patient was admitted to [facility] on 1/4/23 with chest pain with elevated troponin-NSTEMI.    PMH: sleep apnea, on CPAP, hyperlipidemia, diet-controlled, BPH, history of borderline IgA nephropathy, history of hemorrhoidal bleeding, status post hemorrhoidectomy, history of been anemia.    Echo showed EF of 55-60%. Normal left ventricular wall motion. There is mild (1+) aortic regurgitation.    1/4/23: Coronary angiogram via RRA showed spontaneous coronary dissection (SCAD) of diagonal, OM and RPAV/RPL branches. Given hemodynamic stability and relatively small size of the involved vessels, no intervention was performed.    Pt was started on ASA, Lipitor, Imdur, Plavix, Metoprolol and NTG at time of discharge.    Called patient to discuss any post hospital d/c questions he may have, review medication changes, and confirm f/u appts. Patient denied any questions regarding new medications or changes with their PTA medications.    Patient denied any SOB, fever or light headedness. 1/10 dc anterior chest discomfort, \"that is always there and I had this in the hospital too.\" No change in intensity-same as was during IP stay. 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    RRA cardiac cath site is without bleeding, swelling, redness or signs of infection. States he will have an occasional \"sharp\" pain to right wrist without other symptoms. Instructed pt he could try PRN Tylenol, ice pack for comfort.    RN confirmed with patient that he is scheduled for an OV on 1/20/23 at 1255 with DAYAN Dedra Foy at our [place] [facility]. Dr. Muniz Team RN phone number provided for any questions prior to this appt.    Pt is scheduled for Cardiac Rehab on 1/19/23 at 0915 in [place].    Patient advised to call clinic with any cardiac related questions or concerns prior to this dayan't. Patient verbalized understanding and agreed with plan. YASMIN Phillips RN.                    "

## 2023-01-13 ENCOUNTER — CARE COORDINATION (OUTPATIENT)
Dept: CARDIOLOGY | Facility: CLINIC | Age: 68
End: 2023-01-13

## 2023-01-13 NOTE — PROGRESS NOTES
Pt left message stating that he has been waking up with right arm pain. I called pt back and he said the pain has mainly been occurring when he first wakes up and is stretching. He has noticed a shooting type pain down his right arm. He said at first the pain was around his wrist site from the angiogram, but recently he noticed it goes all the way up to his elbow and upper arm. He has not noticed the pain during the day or with activity. Pt said has occasional dull aching chest pain that he would rate at 1/10, but was told some occasional pain is to be expected post SCAD. He said that he doesn't have any redness or warmth, or any type of red streaking up his arm. There is no drainage or swelling at his cath access site. He does have some bruising, but said it's improving. I told pt that his arm could be irritated from his heart cath as it was the same arm that was accessed. I recommended that he try taking some tylenol tonight to see if that helps. I told pt if he notices any chest pain with the arm pain he could try taking sublingual nitroglycerin to see if he notices improvement. Pt advised to go to ED for any worsening chest pain or to urgent care over the weekend if arm pain worsens. Pt also given the option to call the on call cardiologist if he has any concerns over the weekend. I will message scheduling to see if his post cath FLO visit can be moved to early next week. Karli PERKINS January 13, 2023, 3:42 PM

## 2023-01-16 NOTE — TELEPHONE ENCOUNTER
Messaged to scheduling that Trena Adidson PA-C also wants a 1 hour visit.  Mee Hernández RN 01/16/23 12:47 PM

## 2023-01-17 ENCOUNTER — OFFICE VISIT (OUTPATIENT)
Dept: CARDIOLOGY | Facility: CLINIC | Age: 68
End: 2023-01-17
Payer: MEDICARE

## 2023-01-17 VITALS
WEIGHT: 159 LBS | OXYGEN SATURATION: 98 % | HEART RATE: 65 BPM | SYSTOLIC BLOOD PRESSURE: 106 MMHG | BODY MASS INDEX: 24.1 KG/M2 | DIASTOLIC BLOOD PRESSURE: 63 MMHG | HEIGHT: 68 IN

## 2023-01-17 DIAGNOSIS — M79.621 PAIN OF RIGHT UPPER ARM: ICD-10-CM

## 2023-01-17 DIAGNOSIS — I21.4 NON-STEMI (NON-ST ELEVATED MYOCARDIAL INFARCTION) (H): Primary | ICD-10-CM

## 2023-01-17 DIAGNOSIS — S65.891A: ICD-10-CM

## 2023-01-17 DIAGNOSIS — I25.42 SPONTANEOUS DISSECTION OF CORONARY ARTERY: ICD-10-CM

## 2023-01-17 DIAGNOSIS — I77.3 FIBROMUSCULAR DYSPLASIA OF CAROTID ARTERY (H): ICD-10-CM

## 2023-01-17 PROCEDURE — 99205 OFFICE O/P NEW HI 60 MIN: CPT | Performed by: NURSE PRACTITIONER

## 2023-01-17 RX ORDER — CLOPIDOGREL BISULFATE 75 MG/1
75 TABLET ORAL DAILY
Qty: 30 TABLET | Refills: 1 | Status: SHIPPED | OUTPATIENT
Start: 2023-01-17 | End: 2023-05-31

## 2023-01-17 RX ORDER — METOPROLOL TARTRATE 25 MG/1
12.5 TABLET, FILM COATED ORAL 2 TIMES DAILY
Qty: 60 TABLET | Refills: 3 | Status: SHIPPED | OUTPATIENT
Start: 2023-01-17 | End: 2023-07-07

## 2023-01-17 RX ORDER — ATORVASTATIN CALCIUM 40 MG/1
40 TABLET, FILM COATED ORAL DAILY
Qty: 90 TABLET | Refills: 3 | Status: SHIPPED | OUTPATIENT
Start: 2023-01-17 | End: 2023-11-17

## 2023-01-17 RX ORDER — ISOSORBIDE MONONITRATE 30 MG/1
30 TABLET, EXTENDED RELEASE ORAL DAILY
Qty: 30 TABLET | Refills: 1 | Status: ON HOLD | OUTPATIENT
Start: 2023-01-17 | End: 2023-03-22

## 2023-01-17 NOTE — PROGRESS NOTES
~Cardiology Clinic Visit~    Primary Cardiologist: Dr. Muniz  Reason for visit: Hospital follow-up    History of Present Illness    Raúl Marie is a very pleasant 67 year old male with a past medical history notable for HLD, IgA nephropathy, glaucoma, recent severe GI bleed with recent hemorrhoidectomy, NSTEMI with SCAD, new diagnosis fibromuscular dysplasia.    Patient was recently admitted to [facility] on 1/4/2023 with chest pain and elevated troponin.  He presented with typical ACS, and underwent coronary angiography that same day.  Study revealed spontaneous coronary artery dissection with underlying coronary artery disease involving the diagonal, obtuse marginal, RPAV/RPL branches.    He was initiated on aspirin and Plavix for 3 months per the most recent JACC guidelines, with aspirin indefinitely thereafter.  He was also started on Imdur 30 mg daily.  Patient noted acute onset of visual changes after his coronary angiogram which prompted a CT scan of the head.  Imaging was negative for CVA, however showed a vascular appearance concerning for fibromuscular dysplasia in the carotid arteries.    He works with him [employer] and is on multiple vitamin and herbal supplements.  He was counseled regarding taking these supplements in addition to the new medications he is on while hospitalized.    His echocardiogram shows preserved EF at 55-60%, with normal LV wall motion without evidence of LV mass or tumor.  There is mild 1+ aortic regurgitation.    Interval 01/17/23    He has done well posthospitalization.  He comes to me with no acute concerns or new symptoms.  He does have some residual right wrist pain and discomfort.  It is gradually improving, but does still cause him some discomfort.  He denies numbness, tingling, weakness, or motor deficits to that arm.  He is very mild residual chest discomfort.  He rates it less than 1 out of 10, and is gradually improving as he recovers.    He has a host of  -- DO NOT REPLY / DO NOT REPLY ALL --  -- Message is from the [facility]--    Contacted Mr. Limon regarding follow-up for   Other complete intestinal obstruction after hospital discharge. He was discharged from the hospital on 8/18/21. Review of the Discharge Summary / After Visit Summary from the recent hospitalization indicates that the patient needs to follow up with Dr. Fitzgerald on 8/25/21 at 1:00 pm..    He feels that he is doing fairly well at home.   His diet concern is none. Overall, the patient is eating well.   Ambulation: staying the same (uses walker)  Fever: is not present  Pain: none  Activities of Daily Living (global): Self-care   Patient states that he does have sufficient family support. He feels that he is able to ask for assistance when needed. Lives with spouse.    Additional patient/family concerns: None .    Discharge medications were reviewed and reconciled with the patient.      He is fully compliant with the medication regimen prescribed at the time of discharge. He reports that he is not experiencing any medication side effects.    Upon discharge, the patient was to receive home healthcare services  and physical therapy. These services have been initiated. These services have been scheduled and no further arrangements are needed at this time.    The follow up appointment date, time and location was verified with the patient.  Mr. Limon was reminded about the importance of keeping this appointment. PHF with Dr. Fitzgerald on 8/25/21a t 1:00 pm.     additional questions that we discussed together today.  We had a very long discussion regarding his new diagnosis, additional testing he will need to have completed, current medications and rationale for each, the pathophysiology of scad, genetic counseling, red flag signs or symptoms to seek additional care, and general lifestyle modifications he can implement to reduce cardiovascular risk.  I spent over 45 minutes having a long discussion with this very pleasant patient.  He felt as though all of his questions were answered at the completion of our visit today.    Impression    NSTEMI secondary to SCAD  SCAD of diagonal, OM and RPAV/RPL branches  Underlying coronary artery disease  Fibromuscular dysplasia, new diagnosis  - Recovering well without further e/o ongoing ischemia or infarct.  - LV function normal.  - FMD noted on CT of head/neck in the carotids without evidence of significant stenosis.  - Multiple lengthy conversations had with patient while hospitalized discussing this new diagnosis.  - FMD work-up completed --> coronary angiogram, CT head/neck.    - CTA renal with contrast deferred while hospitalized for [place].  - In consideration of NSTEMI and underlying CAD, currently treated with DAPT x3 months.  - Outpatient genetic counseling scheduled.  - On Imdur 30 mg daily with slight, <1/10 residual chest discomfort.    Recent severe GI bleed, stable Hgb 12.4  Acute kidney injury, Cr peaked at 1.41, now stable at 1.08, GFR 75.  Dyslipidemia, , hdl 47, tot chol 164- started on lipitor 40.  IgA nephropathy, with baseline creatinine about 1.1-1.2, stable.      Polypharmacy  - Pt following with a naturopath for adrenal fatigue, mental fogginess etc.      __________________________________________________________________    Plan:  1. Continue Imdur 30 mg daily.  Patient counseled to reach out to clinic if his residual chest comfort completely resolves, and we can taper off the Imdur.  Continue DAPT with  clopidogrel and aspirin until follow-up with Dr. Muniz.  2. Complete right arm arterial ultrasound for ongoing discomfort.  3. Complete CTA of renal arteries to complete FMD work-up.  4. Continue with genetic counseling appointment as scheduled.  5. We will follow-up with Dr. Muniz in approximately 3 months.  6. Patient thoroughly counseled on red flag signs and symptoms and when to seek additional care.  __________________________________________________________________    Thank you for the opportunity to participate in this pleasant patient's care.    We would be happy to see this patient sooner for any concerns in the meantime.    ABIODUN Bennett, CNP   Nurse Practitioner  [facility]    Today's clinic visit entailed:  Review of prior external note(s) from - hospitalization notes and discharge summary  Review of the result(s) of each unique test - echo, angiogram, CT, MRA, labs  Ordering of each unique test  Prescription drug management  Greater than 75 minutes spent on the date of the encounter doing chart review, history and exam, documentation and further activities per the note    The level of medical decision making during this visit was of high complexity.    Orders this Visit:  Orders Placed This Encounter   Procedures     CTA Renal w Contrast     US Upper Extremity Arterial Duplex Right     Follow-Up with Cardiology     Orders Placed This Encounter   Medications     metoprolol tartrate (LOPRESSOR) 25 MG tablet     Sig: Take 0.5 tablets (12.5 mg) by mouth 2 times daily     Dispense:  60 tablet     Refill:  3     atorvastatin (LIPITOR) 40 MG tablet     Sig: Take 1 tablet (40 mg) by mouth daily     Dispense:  90 tablet     Refill:  3     clopidogrel (PLAVIX) 75 MG tablet     Sig: Take 1 tablet (75 mg) by mouth daily     Dispense:  30 tablet     Refill:  1     isosorbide mononitrate (IMDUR) 30 MG 24 hr tablet     Sig: Take 1 tablet (30 mg) by mouth daily     Dispense:  30 tablet      Refill:  1     Medications Discontinued During This Encounter   Medication Reason     atorvastatin (LIPITOR) 40 MG tablet Reorder (No AVS / No eCancel)     clopidogrel (PLAVIX) 75 MG tablet Reorder (No AVS / No eCancel)     isosorbide mononitrate (IMDUR) 30 MG 24 hr tablet Reorder (No AVS / No eCancel)     metoprolol tartrate (LOPRESSOR) 25 MG tablet Reorder (No AVS / No eCancel)     Encounter Diagnoses   Name Primary?     Non-STEMI (non-ST elevated myocardial infarction) (H) Yes     Fibromuscular dysplasia of carotid artery (H)      Spontaneous dissection of coronary artery      Pain of right upper arm      Other specified injury of other blood vessels at wrist and hand level of right arm, initial encounter      CURRENT MEDICATIONS:  Current Outpatient Medications   Medication Sig Dispense Refill     aspirin (ASA) 81 MG EC tablet Take 1 tablet (81 mg) by mouth daily 100 tablet 0     atorvastatin (LIPITOR) 40 MG tablet Take 1 tablet (40 mg) by mouth daily 90 tablet 3     clopidogrel (PLAVIX) 75 MG tablet Take 1 tablet (75 mg) by mouth daily 30 tablet 1     isosorbide mononitrate (IMDUR) 30 MG 24 hr tablet Take 1 tablet (30 mg) by mouth daily 30 tablet 1     latanoprostene bunod (VYZULTA) 0.024 % SOLN ophthalmic solution Place 1 drop into both eyes At Bedtime       metoprolol tartrate (LOPRESSOR) 25 MG tablet Take 0.5 tablets (12.5 mg) by mouth 2 times daily 60 tablet 3     polyethylene glycol (MIRALAX) 17 GM/Dose powder Take 17 g by mouth daily 510 g 0     psyllium (METAMUCIL/KONSYL) Packet Take 1 packet by mouth daily 30 packet 0     tamsulosin (FLOMAX) 0.4 MG capsule Take 0.4 mg by mouth daily       nitroGLYcerin (NITROSTAT) 0.4 MG sublingual tablet For chest pain place 1 tablet under the tongue every 5 minutes for 3 doses. If symptoms persist 5 minutes after 1st dose call 911. (Patient not taking: Reported on 1/17/2023) 15 tablet 0     SENNA-docusate sodium (SENNA S) 8.6-50 MG tablet Take 1-2 tablets by mouth At  "Bedtime (Patient not taking: Reported on 1/17/2023) 90 tablet 0     ALLERGIES     Allergies   Allergen Reactions     Chlorpheniramine-Phenylephrine Itching and Other (See Comments)     Other reaction(s): Other (See Comments)     Walnuts [Nuts]      Wheat Extract      PAST MEDICAL, SURGICAL, FAMILY, SOCIAL HISTORY:  History was reviewed and updated as needed, see medical record.    Review of Systems:  Review Of Systems  Skin: negative  Eyes: negative  Ears/Nose/Throat: negative  Respiratory: No shortness of breath, dyspnea on exertion, cough, or hemoptysis  Cardiovascular: positive for chest pain, negative for, exertional chest pain or pressure, paroxysmal nocturnal dyspnea, lower extremity edema, syncope or near-syncope and exercise intolerance  Gastrointestinal: negative  Genitourinary: negative  Musculoskeletal: positive for right wrist pain.  Neurologic: negative  Psychiatric: negative  Hematologic/Lymphatic/Immunologic: negative and bleeding disorder  Endocrine: negative     Physical Exam:    Vitals: /63   Pulse 65   Ht 1.715 m (5' 7.5\")   Wt 72.1 kg (159 lb)   SpO2 98%   BMI 24.54 kg/m    Constitutional: Appears stated age, well nourished, NAD.  Eyes: Pupils equal, round. Sclerae anicteric.   HEENT: Normocephalic, atraumatic.   Neck: Supple. Carotid pulses full and equal. No carotid bruit.  Respiratory: Non-labored. Lungs CTAB.  Cardiovascular: RRR, normal S1 and S2. No M/G/R.  No edema.  GI: Soft, non-distended, non-tender.  Skin: Warm and dry. No rashes, cyanosis, edema.  Musculoskeletal/Extremities: Symmetrical movement to all extremities.  Neurologic: No gross focal deficits. Alert, awake, and oriented to all spheres.  Psychiatric: Affect appropriate. Mentation normal.    Recent Lab Results:  LIPID RESULTS:  Lab Results   Component Value Date    CHOL 164 01/04/2023    HDL 47 01/04/2023     (H) 01/04/2023    TRIG 61 01/04/2023     LIVER ENZYME RESULTS:  Lab Results   Component Value Date    " AST 27 01/04/2023    AST 27 01/11/2011    ALT 31 01/04/2023    ALT 41 01/11/2011     CBC RESULTS:  Lab Results   Component Value Date    WBC 5.7 01/06/2023    WBC 8.4 01/11/2011    RBC 4.83 01/06/2023    RBC 5.10 01/11/2011    HGB 12.4 (L) 01/06/2023    HGB 15.1 01/11/2011    HCT 38.5 (L) 01/06/2023    HCT 44.6 01/11/2011    MCV 80 01/06/2023    MCV 88 01/11/2011    MCH 25.7 (L) 01/06/2023    MCH 29.6 01/11/2011    MCHC 32.2 01/06/2023    MCHC 33.9 01/11/2011    RDW 23.3 (H) 01/06/2023    RDW 13.9 01/11/2011     01/06/2023     01/11/2011     BMP RESULTS:  Lab Results   Component Value Date     01/06/2023     01/11/2011    POTASSIUM 4.0 01/06/2023    POTASSIUM 4.0 01/11/2011    CHLORIDE 105 01/06/2023    CHLORIDE 105 01/11/2011    CO2 26 01/06/2023    CO2 25 01/11/2011    ANIONGAP 8 01/06/2023    ANIONGAP 10 01/11/2011    GLC 96 01/06/2023    GLC 95 01/11/2011    BUN 20 01/06/2023    BUN 24 01/11/2011    CR 1.08 01/06/2023    CR 1.06 01/11/2011    GFRESTIMATED 75 01/06/2023    GFRESTIMATED 73 01/11/2011    GFRESTBLACK 88 01/11/2011    SONA 9.3 01/06/2023    SONA 9.3 01/11/2011      A1C RESULTS:  Lab Results   Component Value Date    A1C 4.9 01/04/2023     INR RESULTS:  Lab Results   Component Value Date    INR 1.11 10/13/2022    INR 1.10 10/05/2022

## 2023-01-17 NOTE — LETTER
1/17/2023    Meenu Gusman MD  [facility] [address]  [facility] [address]    RE: Raúl Turnerman       Dear Colleague,     I had the pleasure of seeing Raúl Marie in the [facility].              ~Cardiology Clinic Visit~    Primary Cardiologist: Dr. Muniz  Reason for visit: Hospital follow-up    History of Present Illness    Raúl Marie is a very pleasant 67 year old male with a past medical history notable for HLD, IgA nephropathy, glaucoma, recent severe GI bleed with recent hemorrhoidectomy, NSTEMI with SCAD, new diagnosis fibromuscular dysplasia.    Patient was recently admitted to [facility] on 1/4/2023 with chest pain and elevated troponin.  He presented with typical ACS, and underwent coronary angiography that same day.  Study revealed spontaneous coronary artery dissection with underlying coronary artery disease involving the diagonal, obtuse marginal, RPAV/RPL branches.    He was initiated on aspirin and Plavix for 3 months per the most recent JACC guidelines, with aspirin indefinitely thereafter.  He was also started on Imdur 30 mg daily.  Patient noted acute onset of visual changes after his coronary angiogram which prompted a CT scan of the head.  Imaging was negative for CVA, however showed a vascular appearance concerning for fibromuscular dysplasia in the carotid arteries.    He works with him naturopath, and is on multiple vitamin and herbal supplements.  He was counseled regarding taking these supplements in addition to the new medications he is on while hospitalized.    His echocardiogram shows preserved EF at 55-60%, with normal LV wall motion without evidence of LV mass or tumor.  There is mild 1+ aortic regurgitation.    Interval 01/17/23    He has done well posthospitalization.  He comes to me with no acute concerns or new symptoms.  He does have some residual right wrist pain and discomfort.  It is gradually improving, but does still cause  him some discomfort.  He denies numbness, tingling, weakness, or motor deficits to that arm.  He is very mild residual chest discomfort.  He rates it less than 1 out of 10, and is gradually improving as he recovers.    He has a host of additional questions that we discussed together today.  We had a very long discussion regarding his new diagnosis, additional testing he will need to have completed, current medications and rationale for each, the pathophysiology of scad, genetic counseling, red flag signs or symptoms to seek additional care, and general lifestyle modifications he can implement to reduce cardiovascular risk.  I spent over 45 minutes having a long discussion with this very pleasant patient.  He felt as though all of his questions were answered at the completion of our visit today.    Impression    NSTEMI secondary to SCAD  SCAD of diagonal, OM and RPAV/RPL branches  Underlying coronary artery disease  Fibromuscular dysplasia, new diagnosis  - Recovering well without further e/o ongoing ischemia or infarct.  - LV function normal.  - FMD noted on CT of head/neck in the carotids without evidence of significant stenosis.  - Multiple lengthy conversations had with patient while hospitalized discussing this new diagnosis.  - FMD work-up completed --> coronary angiogram, CT head/neck.    - CTA renal with contrast deferred while hospitalized for [place].  - In consideration of NSTEMI and underlying CAD, currently treated with DAPT x3 months.  - Outpatient genetic counseling scheduled.  - On Imdur 30 mg daily with slight, <1/10 residual chest discomfort.    Recent severe GI bleed, stable Hgb 12.4  Acute kidney injury, Cr peaked at 1.41, now stable at 1.08, GFR 75.  Dyslipidemia, , hdl 47, tot chol 164- started on lipitor 40.  IgA nephropathy, with baseline creatinine about 1.1-1.2, stable.      Polypharmacy  - Pt following with a naturopath for adrenal fatigue, mental fogginess etc.       __________________________________________________________________    Plan:  1. Continue Imdur 30 mg daily.  Patient counseled to reach out to clinic if his residual chest comfort completely resolves, and we can taper off the Imdur.  Continue DAPT with clopidogrel and aspirin until follow-up with Dr. Muniz.  2. Complete right arm arterial ultrasound for ongoing discomfort.  3. Complete CTA of renal arteries to complete FMD work-up.  4. Continue with genetic counseling appointment as scheduled.  5. We will follow-up with Dr. Muniz in approximately 3 months.  6. Patient thoroughly counseled on red flag signs and symptoms and when to seek additional care.  __________________________________________________________________    Thank you for the opportunity to participate in this pleasant patient's care.    We would be happy to see this patient sooner for any concerns in the meantime.    ABIODUN Bennett, CNP   Nurse Practitioner  [facility]    Today's clinic visit entailed:  Review of prior external note(s) from - hospitalization notes and discharge summary  Review of the result(s) of each unique test - echo, angiogram, CT, MRA, labs  Ordering of each unique test  Prescription drug management  Greater than 75 minutes spent on the date of the encounter doing chart review, history and exam, documentation and further activities per the note    The level of medical decision making during this visit was of high complexity.    Orders this Visit:  Orders Placed This Encounter   Procedures     CTA Renal w Contrast     US Upper Extremity Arterial Duplex Right     Follow-Up with Cardiology     Orders Placed This Encounter   Medications     metoprolol tartrate (LOPRESSOR) 25 MG tablet     Sig: Take 0.5 tablets (12.5 mg) by mouth 2 times daily     Dispense:  60 tablet     Refill:  3     atorvastatin (LIPITOR) 40 MG tablet     Sig: Take 1 tablet (40 mg) by mouth daily     Dispense:  90 tablet      Refill:  3     clopidogrel (PLAVIX) 75 MG tablet     Sig: Take 1 tablet (75 mg) by mouth daily     Dispense:  30 tablet     Refill:  1     isosorbide mononitrate (IMDUR) 30 MG 24 hr tablet     Sig: Take 1 tablet (30 mg) by mouth daily     Dispense:  30 tablet     Refill:  1     Medications Discontinued During This Encounter   Medication Reason     atorvastatin (LIPITOR) 40 MG tablet Reorder (No AVS / No eCancel)     clopidogrel (PLAVIX) 75 MG tablet Reorder (No AVS / No eCancel)     isosorbide mononitrate (IMDUR) 30 MG 24 hr tablet Reorder (No AVS / No eCancel)     metoprolol tartrate (LOPRESSOR) 25 MG tablet Reorder (No AVS / No eCancel)     Encounter Diagnoses   Name Primary?     Non-STEMI (non-ST elevated myocardial infarction) (H) Yes     Fibromuscular dysplasia of carotid artery (H)      Spontaneous dissection of coronary artery      Pain of right upper arm      Other specified injury of other blood vessels at wrist and hand level of right arm, initial encounter      CURRENT MEDICATIONS:  Current Outpatient Medications   Medication Sig Dispense Refill     aspirin (ASA) 81 MG EC tablet Take 1 tablet (81 mg) by mouth daily 100 tablet 0     atorvastatin (LIPITOR) 40 MG tablet Take 1 tablet (40 mg) by mouth daily 90 tablet 3     clopidogrel (PLAVIX) 75 MG tablet Take 1 tablet (75 mg) by mouth daily 30 tablet 1     isosorbide mononitrate (IMDUR) 30 MG 24 hr tablet Take 1 tablet (30 mg) by mouth daily 30 tablet 1     latanoprostene bunod (VYZULTA) 0.024 % SOLN ophthalmic solution Place 1 drop into both eyes At Bedtime       metoprolol tartrate (LOPRESSOR) 25 MG tablet Take 0.5 tablets (12.5 mg) by mouth 2 times daily 60 tablet 3     polyethylene glycol (MIRALAX) 17 GM/Dose powder Take 17 g by mouth daily 510 g 0     psyllium (METAMUCIL/KONSYL) Packet Take 1 packet by mouth daily 30 packet 0     tamsulosin (FLOMAX) 0.4 MG capsule Take 0.4 mg by mouth daily       nitroGLYcerin (NITROSTAT) 0.4 MG sublingual tablet For  "chest pain place 1 tablet under the tongue every 5 minutes for 3 doses. If symptoms persist 5 minutes after 1st dose call 911. (Patient not taking: Reported on 1/17/2023) 15 tablet 0     SENNA-docusate sodium (SENNA S) 8.6-50 MG tablet Take 1-2 tablets by mouth At Bedtime (Patient not taking: Reported on 1/17/2023) 90 tablet 0     ALLERGIES     Allergies   Allergen Reactions     Chlorpheniramine-Phenylephrine Itching and Other (See Comments)     Other reaction(s): Other (See Comments)     Walnuts [Nuts]      Wheat Extract      PAST MEDICAL, SURGICAL, FAMILY, SOCIAL HISTORY:  History was reviewed and updated as needed, see medical record.    Review of Systems:  Review Of Systems  Skin: negative  Eyes: negative  Ears/Nose/Throat: negative  Respiratory: No shortness of breath, dyspnea on exertion, cough, or hemoptysis  Cardiovascular: positive for chest pain, negative for, exertional chest pain or pressure, paroxysmal nocturnal dyspnea, lower extremity edema, syncope or near-syncope and exercise intolerance  Gastrointestinal: negative  Genitourinary: negative  Musculoskeletal: positive for right wrist pain.  Neurologic: negative  Psychiatric: negative  Hematologic/Lymphatic/Immunologic: negative and bleeding disorder  Endocrine: negative     Physical Exam:    Vitals: /63   Pulse 65   Ht 1.715 m (5' 7.5\")   Wt 72.1 kg (159 lb)   SpO2 98%   BMI 24.54 kg/m    Constitutional: Appears stated age, well nourished, NAD.  Eyes: Pupils equal, round. Sclerae anicteric.   HEENT: Normocephalic, atraumatic.   Neck: Supple. Carotid pulses full and equal. No carotid bruit.  Respiratory: Non-labored. Lungs CTAB.  Cardiovascular: RRR, normal S1 and S2. No M/G/R.  No edema.  GI: Soft, non-distended, non-tender.  Skin: Warm and dry. No rashes, cyanosis, edema.  Musculoskeletal/Extremities: Symmetrical movement to all extremities.  Neurologic: No gross focal deficits. Alert, awake, and oriented to all spheres.  Psychiatric: " Affect appropriate. Mentation normal.    Recent Lab Results:  LIPID RESULTS:  Lab Results   Component Value Date    CHOL 164 01/04/2023    HDL 47 01/04/2023     (H) 01/04/2023    TRIG 61 01/04/2023     LIVER ENZYME RESULTS:  Lab Results   Component Value Date    AST 27 01/04/2023    AST 27 01/11/2011    ALT 31 01/04/2023    ALT 41 01/11/2011     CBC RESULTS:  Lab Results   Component Value Date    WBC 5.7 01/06/2023    WBC 8.4 01/11/2011    RBC 4.83 01/06/2023    RBC 5.10 01/11/2011    HGB 12.4 (L) 01/06/2023    HGB 15.1 01/11/2011    HCT 38.5 (L) 01/06/2023    HCT 44.6 01/11/2011    MCV 80 01/06/2023    MCV 88 01/11/2011    MCH 25.7 (L) 01/06/2023    MCH 29.6 01/11/2011    MCHC 32.2 01/06/2023    MCHC 33.9 01/11/2011    RDW 23.3 (H) 01/06/2023    RDW 13.9 01/11/2011     01/06/2023     01/11/2011     BMP RESULTS:  Lab Results   Component Value Date     01/06/2023     01/11/2011    POTASSIUM 4.0 01/06/2023    POTASSIUM 4.0 01/11/2011    CHLORIDE 105 01/06/2023    CHLORIDE 105 01/11/2011    CO2 26 01/06/2023    CO2 25 01/11/2011    ANIONGAP 8 01/06/2023    ANIONGAP 10 01/11/2011    GLC 96 01/06/2023    GLC 95 01/11/2011    BUN 20 01/06/2023    BUN 24 01/11/2011    CR 1.08 01/06/2023    CR 1.06 01/11/2011    GFRESTIMATED 75 01/06/2023    GFRESTIMATED 73 01/11/2011    GFRESTBLACK 88 01/11/2011    SONA 9.3 01/06/2023    SONA 9.3 01/11/2011      A1C RESULTS:  Lab Results   Component Value Date    A1C 4.9 01/04/2023     INR RESULTS:  Lab Results   Component Value Date    INR 1.11 10/13/2022    INR 1.10 10/05/2022             Thank you for allowing me to participate in the care of your patient.      Sincerely,     ABIODUN Bennett [facility]  cc:   Trena Addison PA-C  [address]  [place] [address]

## 2023-01-17 NOTE — PATIENT INSTRUCTIONS
Thank you for your visit with the [facility] today.    Today's plan:   Complete ultrasound of the right wrist.  Complete CT scan of renal arteries.  Cancel appointment with asa at end of February, and schedule 3-month follow up with Dr. Muniz.  Continue heart medications, including Imdur.  Continue blood thinners until you see Dr. Muniz, and then discuss continuation.  Start cardiac rehab  See genetics counselor.    If you have questions or concerns, please do not hesitate to call my nursing support team at [phone].    Scheduling phone number: [phone]  [facility] Clinic Number: [phone]    It was a pleasure seeing you today.     ABIODUN Bennett, CNP  Nurse Practitioner  [facility]  January 17, 2023  ________________________________________________________

## 2023-01-19 ENCOUNTER — HOSPITAL ENCOUNTER (EMERGENCY)
Facility: CLINIC | Age: 68
End: 2023-01-19
Payer: MEDICARE

## 2023-01-19 ENCOUNTER — HOSPITAL ENCOUNTER (OUTPATIENT)
Dept: CARDIAC REHAB | Facility: CLINIC | Age: 68
Discharge: HOME | End: 2023-01-19
Attending: STUDENT IN AN ORGANIZED HEALTH CARE EDUCATION/TRAINING PROGRAM
Payer: MEDICARE

## 2023-01-19 ENCOUNTER — HOSPITAL ENCOUNTER (OUTPATIENT)
Dept: ULTRASOUND IMAGING | Facility: CLINIC | Age: 68
Discharge: HOME | End: 2023-01-19
Attending: NURSE PRACTITIONER
Payer: MEDICARE

## 2023-01-19 ENCOUNTER — HOSPITAL ENCOUNTER (OUTPATIENT)
Dept: CT IMAGING | Facility: CLINIC | Age: 68
Discharge: HOME | End: 2023-01-19
Attending: NURSE PRACTITIONER
Payer: MEDICARE

## 2023-01-19 DIAGNOSIS — M79.621 PAIN OF RIGHT UPPER ARM: ICD-10-CM

## 2023-01-19 DIAGNOSIS — S65.891A: ICD-10-CM

## 2023-01-19 DIAGNOSIS — I21.4 NON-STEMI (NON-ST ELEVATED MYOCARDIAL INFARCTION) (H): ICD-10-CM

## 2023-01-19 DIAGNOSIS — I77.3 FIBROMUSCULAR DYSPLASIA OF CAROTID ARTERY (H): ICD-10-CM

## 2023-01-19 PROCEDURE — 250N000009 HC RX 250: Performed by: NURSE PRACTITIONER

## 2023-01-19 PROCEDURE — 250N000011 HC RX IP 250 OP 636: Performed by: NURSE PRACTITIONER

## 2023-01-19 PROCEDURE — 93798 PHYS/QHP OP CAR RHAB W/ECG: CPT | Performed by: REHABILITATION PRACTITIONER

## 2023-01-19 PROCEDURE — G1010 CDSM STANSON: HCPCS

## 2023-01-19 PROCEDURE — 93931 UPPER EXTREMITY STUDY: CPT | Mod: RT

## 2023-01-19 RX ORDER — IOPAMIDOL 755 MG/ML
80 INJECTION, SOLUTION INTRAVASCULAR ONCE
Status: COMPLETED | OUTPATIENT
Start: 2023-01-19 | End: 2023-01-19

## 2023-01-19 RX ADMIN — IOPAMIDOL 80 ML: 755 INJECTION, SOLUTION INTRAVENOUS at 18:17

## 2023-01-19 RX ADMIN — SODIUM CHLORIDE 80 ML: 9 INJECTION, SOLUTION INTRAVENOUS at 18:17

## 2023-01-23 ENCOUNTER — TELEPHONE (OUTPATIENT)
Dept: CARDIOLOGY | Facility: CLINIC | Age: 68
End: 2023-01-23
Payer: MEDICARE

## 2023-01-23 DIAGNOSIS — N28.89 LEFT RENAL MASS: Primary | ICD-10-CM

## 2023-01-23 DIAGNOSIS — I21.4 NON-STEMI (NON-ST ELEVATED MYOCARDIAL INFARCTION) (H): Primary | ICD-10-CM

## 2023-01-23 DIAGNOSIS — N28.89 LEFT RENAL MASS: ICD-10-CM

## 2023-01-23 DIAGNOSIS — I77.3 FIBROMUSCULAR DYSPLASIA OF CAROTID ARTERY (H): ICD-10-CM

## 2023-01-23 NOTE — TELEPHONE ENCOUNTER
----- Message from Justo Muniz MD sent at 1/23/2023  3:13 PM CST -----  The patient was found to have a 2 cm renal mass suspicious for malignancy on CT scan of the abdomen.  I called the patient and discussed the results with him and told him to expect our call arranging oncology follow-up.  Can you please assist me in arranging that follow-up consultation?  Thank you.  EE

## 2023-01-23 NOTE — TELEPHONE ENCOUNTER
Oncology referral order placed per request from . Will watch to make sure pt is able to get scheduled for an oncology visit. Karli PERKINS January 23, 2023, 4:02 PM

## 2023-01-23 NOTE — TELEPHONE ENCOUNTER
I reviewed the findings of the patient's recent CT scan of the abdomen, which was done for evaluation of vascular structures after the recent diagnosis of fibromuscular dysplasia and coronary artery dissection causing myocardial infarction.  Minimal dilation of the iliac artery and celiac trunk (both 12 mm in diameter) might be related to fibromuscular dysplasia, but more typical findings of renal artery tortuosity and aneurysm formation were not identified.    However, the patient was incidentally noted to have a 2 cm heterogeneously enhancing left medial renal mass suspicious for malignancy.  I called the patient and discussed these findings with him in detail and recommended referral to oncology for further discussion and evaluation.  The patient agrees and we will contact him with that follow-up plan shortly.    Justo Muniz MD

## 2023-01-23 NOTE — TELEPHONE ENCOUNTER
CTA abdomin pelvis:                                                                  IMPRESSION:  1.  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  2.  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  3.  Suspicious left renal mass concerning for renal cell carcinoma.    Pt has F/U 2/20/22 with NP and 4/3/22 with DR MONTENEGRO. JETHRO Flaherty rN

## 2023-01-24 ENCOUNTER — TRANSCRIBE ORDERS (OUTPATIENT)
Dept: OTHER | Age: 68
End: 2023-01-24

## 2023-01-24 ENCOUNTER — PATIENT OUTREACH (OUTPATIENT)
Dept: ONCOLOGY | Facility: CLINIC | Age: 68
End: 2023-01-24
Payer: MEDICARE

## 2023-01-24 ENCOUNTER — TELEPHONE (OUTPATIENT)
Dept: UROLOGY | Facility: CLINIC | Age: 68
End: 2023-01-24
Payer: MEDICARE

## 2023-01-24 DIAGNOSIS — N28.89 RENAL MASS, LEFT: Primary | ICD-10-CM

## 2023-01-24 DIAGNOSIS — N28.89 LEFT RENAL MASS: Primary | ICD-10-CM

## 2023-01-24 NOTE — TELEPHONE ENCOUNTER
Left renal mass    If pt has video capabilities schedule with Dr Frederick otherwise Dr Cuevas usually has some in person appts open close to day of appt

## 2023-01-24 NOTE — TELEPHONE ENCOUNTER
[facility] Call Center    Phone Message    May a detailed message be left on voicemail: yes     Reason for Call: Other: Patient is being referred to Urology for an urgent appointment in 1-2 weeks. Please review and call patient to schedule      Action Taken: Message routed to:  [facility] Urology     Travel Screening: Not Applicable

## 2023-01-24 NOTE — TELEPHONE ENCOUNTER
Pt called looking to get scheduled asap for his urgent referral. Writer found soon[place] [facility] [place] go to either [place] or [place] but is willing to go to other locations if he can get in sooner. Please reach out to pt asap. Thanks

## 2023-01-24 NOTE — PROGRESS NOTES
Referral received in medical oncology,  for findings of Suspicious left renal mass concerning for renal cell carcinoma.     CTA ABDOMEN PELVIS WITH CONTRAST 1/19/23 Left kidney suspicious heterogeneously enhancing mass medial mid kidney 2.0 x 1.9 x 1.7 cm.     This patient needs to be seen for consultation in urology rather than medical oncology.  I have requested our new intake team transcribe a new referral for urology and sent in basket to referring provider and pcp to alert of change.    I attempted to reach Raúl to discuss and LM for him to call me.    1530  Raúl returned my call.  I discussed with him the above.  I let him know the referral is being routed to urology, but I gave him the phone number for urology scheduling.  I answered his questions to the best of my ability about renal masses.  He has no other questions at this time.

## 2023-01-25 ENCOUNTER — HOSPITAL ENCOUNTER (OUTPATIENT)
Dept: CARDIAC REHAB | Facility: CLINIC | Age: 68
Discharge: HOME | End: 2023-01-25
Attending: STUDENT IN AN ORGANIZED HEALTH CARE EDUCATION/TRAINING PROGRAM
Payer: MEDICARE

## 2023-01-25 PROCEDURE — 93798 PHYS/QHP OP CAR RHAB W/ECG: CPT | Performed by: OCCUPATIONAL THERAPIST

## 2023-01-26 ENCOUNTER — OFFICE VISIT (OUTPATIENT)
Dept: UROLOGY | Facility: CLINIC | Age: 68
End: 2023-01-26
Payer: MEDICARE

## 2023-01-26 ENCOUNTER — TELEPHONE (OUTPATIENT)
Dept: CARDIOLOGY | Facility: CLINIC | Age: 68
End: 2023-01-26

## 2023-01-26 VITALS
SYSTOLIC BLOOD PRESSURE: 104 MMHG | BODY MASS INDEX: 23.04 KG/M2 | WEIGHT: 152 LBS | HEIGHT: 68 IN | DIASTOLIC BLOOD PRESSURE: 71 MMHG | OXYGEN SATURATION: 98 % | HEART RATE: 63 BPM

## 2023-01-26 DIAGNOSIS — I21.4 NON-STEMI (NON-ST ELEVATED MYOCARDIAL INFARCTION) (H): ICD-10-CM

## 2023-01-26 DIAGNOSIS — N28.89 LEFT RENAL MASS: Primary | ICD-10-CM

## 2023-01-26 PROCEDURE — 99204 OFFICE O/P NEW MOD 45 MIN: CPT | Performed by: UROLOGY

## 2023-01-26 ASSESSMENT — PAIN SCALES - GENERAL: PAINLEVEL: NO PAIN (0)

## 2023-01-26 NOTE — LETTER
1/26/2023       RE: Raúl Marie  [address]  [address]  [facility] [address]     Dear Colleague,    Thank you for referring your patient, Raúl Marie, to the [facility] at [facility]. Please see a copy of my visit note below.          Chief Complaint:   Left Renal Mass         Consult or Referral:     Raúl Marie is a 67 year old male seen at the request of Dr. Wynn         History of Present Illness:    Raúl Marie is a very pleasant 67 year old male who presents with a history of left renal mass. This was noted incidentally on CTA done in the context of recent NSTEMI. He presented for this in early January and has undergone thorough cardiac workup. On 3 months of ASA and Plavix. 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    ##RENAL MASS INITIAL ENCOUNTER##     Raúl Marie is a very pleasant 67 year old male who presents with a history of a Solid renal lesion/mass.  This was discovered incidentally.  Initially diagnosed about 2 week(s) ago.  The lesion is solid and enhancing.  The maximal dimension of the renal lesion is 2.0 centimeters and located on the left side.      Concerning  his renal function, his last SCr is   Lab Results   Component Value Date    CR 1.08 01/06/2023    CR 1.06 01/11/2011   .  he has the following risk factors for development of chronic kidney disease CAD.       he does have a history of previous abdominal or retroperitoneal surgery.  There is not a family history of renal cell cancer.  The patient does not have a history of smoking and currently is not smoking.    The mass/lesion is located in the Left side and is primarily located in the inter polar region.  The tumor is also 50 % endophytic and exophytic.  The mass/lesion primarily lies on the posterior surface.  With regard to the collecting system, the edge of the tumor arrives either abuts the collecting system or  arrives within 4 mm of the collecting system.    ECOG Performance Score: 0 - Independent         Past Medical History:     Past Medical History:   Diagnosis Date     BPH (benign prostatic hyperplasia)      Glaucoma      Hemorrhoids, unspecified hemorrhoid type      IgA nephropathy      Sleep apnea    CAD  Recent MI  Fibrous dysplasia         Past Surgical History:     Past Surgical History:   Procedure Laterality Date     APPENDECTOMY       COLONOSCOPY N/A 10/7/2022    Procedure: COLONOSCOPY, WITH POLYPECTOMY AND BIOPSY;  Surgeon: Sylvie Jj MD;  Location:  GI     CV CORONARY ANGIOGRAM N/A 1/4/2023    Procedure: Coronary Angiogram;  Surgeon: Jose Elias Wahl MD;  Location:  HEART CARDIAC CATH LAB     ESOPHAGOSCOPY, GASTROSCOPY, DUODENOSCOPY (EGD), COMBINED N/A 10/7/2022    Procedure: ESOPHAGOGASTRODUODENOSCOPY, WITH BIOPSY;  Surgeon: Sylvie Jj MD;  Location:  GI          Medications     Current Outpatient Medications   Medication     aspirin (ASA) 81 MG EC tablet     atorvastatin (LIPITOR) 40 MG tablet     clopidogrel (PLAVIX) 75 MG tablet     isosorbide mononitrate (IMDUR) 30 MG 24 hr tablet     latanoprostene bunod (VYZULTA) 0.024 % SOLN ophthalmic solution     metoprolol tartrate (LOPRESSOR) 25 MG tablet     nitroGLYcerin (NITROSTAT) 0.4 MG sublingual tablet     polyethylene glycol (MIRALAX) 17 GM/Dose powder     psyllium (METAMUCIL/KONSYL) Packet     SENNA-docusate sodium (SENNA S) 8.6-50 MG tablet     tamsulosin (FLOMAX) 0.4 MG capsule     No current facility-administered medications for this visit.          Family History:     Family History   Problem Relation Age of Onset     Diabetes Father      Colon Cancer Paternal Grandmother      No known family history of  malignancy.         Social History:     Social History     Socioeconomic History     Marital status: Single     Spouse name: Not on file     Number of children: Not on file     Years of education: Not on file     Highest  "education level: Not on file   Occupational History     Not on file   Tobacco Use     Smoking status: Never     Smokeless tobacco: Never   Substance and Sexual Activity     Alcohol use: Yes     Drug use: Yes     Types: Marijuana     Sexual activity: Not on file   Other Topics Concern     Not on file   Social History Narrative     Not on file     Social Determinants of Health     Financial Resource Strain: Not on file   Food Insecurity: Not on file   Transportation Needs: Not on file   Physical Activity: Not on file   Stress: Not on file   Social Connections: Not on file   Intimate Partner Violence: Not on file   Housing Stability: Not on file     Works with a house cleaning business         Allergies:   Chlorpheniramine-phenylephrine, Walnuts [nuts], and Wheat extract         Review of Systems:  From intake questionnaire     Skin: negative  Eyes: negative  Ears/Nose/Throat: negative  Respiratory: No shortness of breath, dyspnea on exertion, cough, or hemoptysis  Cardiovascular: No chest pain or palpitations  Gastrointestinal: negative; no nausea/vomiting, constipation or diarrhea  Genitourinary: as per HPI  Musculoskeletal: negative  Neurologic: negative  Psychiatric: negative  Hematologic/Lymphatic/Immunologic: negative  Endocrine: negative         Physical Exam:     Patient is a 67 year old  male   Vitals: Blood pressure 104/71, pulse 63, height 1.727 m (5' 8\"), weight 68.9 kg (152 lb), SpO2 98 %.  Constitutional: Body mass index is 23.11 kg/m .  Alert, no acute distress, oriented, conversant  Eyes: no scleral icterus; extraocular muscles intact, moist conjunctivae  Respiratory: no respiratory distress, or pursed lip breathing  Cardiovascular: pulses strong and intact; no obvious jugular venous distension present  Gastrointestinal: soft, nontender, no organomegaly or masses,   Musculoskeletal: extremities normal, no peripheral edema  Skin: no suspicious lesions or rashes  Neuro: Alert, oriented, speech and " mentation normal  Psych: affect and mood normal, alert and oriented to person, place and time      Labs and Pathology:    I reviewed all applicable laboratory and pathology data and went over findings with patient  Significant for   Lab Results   Component Value Date    CR 1.08 01/06/2023    CR 1.09 01/04/2023    CR 1.41 12/27/2022    CR 1.00 10/13/2022    CR 1.32 10/10/2022    CR 1.13 10/09/2022    CR 1.23 10/08/2022    CR 1.18 10/07/2022    CR 1.19 10/05/2022    CR 1.06 01/11/2011       Imaging:    The following imaging exams were independently viewed and interpreted by me and discussed with patient:    CTA 1/19/2023  KIDNEYS/BLADDER: Left kidney suspicious heterogeneously enhancing mass medial mid kidney 2.0 x 1.9 x 1.7 cm. Several additional benign-appearing low-density lesions probable cysts. No stones or hydronephrosis.   Right kidney single benign-appearing low density lesion lower kidney probable cyst. No mass, stones, or hydronephrosis.  Bladder is normal.  IMPRESSION:  1.  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  2.  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  3.  Suspicious left renal mass concerning for renal cell carcinoma.      Outside and Past Medical records:    Review of the result(s) of each unique test - CTA, creat         Assessment and Plan:     Assessment: 67 year old male with incidentally noted 2 cm left posterior renal mass. This was noted in the context of workup for recent NSTEMI, for which he is on dual antiplatelet therapy for 3 months. We had an extensive discussion about the significance of a localized, enhancing kidney mass.  We discussed that approximately 80% of enhancing renal masses turn out to be kidney cancer upon removal, while 20% are found to be benign.  Although certain features such as size, gender and tumor characteristics can change these risks.    We discussed the role of renal mass biopsy including the fact that renal mass biopsy is  safe with current techniques, it can be inaccurate and it can be non-diagnostic.  Furthermore, the majority of patients find they ultimately need to proceed with treatment anyway.      We discussed the options for treatment of a localized kidney mass including active surveillance, thermal ablation, and surgical extirpation.  Surgical removal has the best track record and close to a 99% chance of cure for T1a lesions compared to 90% cure with thermal ablation and is generally preferred.  Furthermore, thermal ablation is not optimal for larger masses or centrally located masses.    We discussed the role of observation and the low risk of metastases associated with lesions less than 2-3 cm in size and the potential for slow/stable growth in many cases.  However, the unpredictable natural history of these lesions was mentioned as a drawback with this approach and the lack of effective therapy in the event of systemic progression.  In general, this approach is most favored for those with limited life expectancy and/or those with indeterminate (< 2-3 cm) renal masses.    Furthermore, we discussed the relative merits of partial nephrectomy vs. radical nephrectomy and the theoretical basis behind maximal nephron preservation.  There is some data suggesting there are long term survival advantages and equivalent cancer control with nephron sparing surgery.  We also discussed the advantages and disadvantages and roles of open surgery vs. laparoscopic (and Da Mechelle assisted) surgery. Risks and benefits of robotic partial nephrectomy were discussed in detail. Risks of surgery including bleeding, infection, MI, stroke, DVT/PE, bowel injury and injury to other surrounding structures were discussed. In addition, we discussed potential for positive surgical margins, urine leak, vascular injury, and potential need for radical nephrectomy or conversion to an open procedure. The anticipated post-operative course was explained as well  as expectations for recovery. All questions were answered.      He would be an excellent candidate for robotic partial nephrectomy. That said, given his recent medical issues and 3 months of dual antiplatelet therapy, an initial interval of observation would be prudent. Will plan MRI in 3 months to better characterize this mass and further discuss treatment options at that time.    Plan:  Follow-up 3 months with MRI and labs    Orders  Orders Placed This Encounter   Procedures     MR Renal wo & w Contrast     Basic metabolic panel [LAB15]     CBC with platelets [UTP415]     50 total minutes spent on the date of the encounter including direct interaction with the patient, performing chart review, documentation and further activities as noted above.    Jose Elias Cuevas MD  Urology  [facility]

## 2023-01-26 NOTE — PROGRESS NOTES
Chief Complaint:   Left Renal Mass         Consult or Referral:     Raúl Marie is a 67 year old male seen at the request of Dr. Wynn         History of Present Illness:    Raúl Marie is a very pleasant 67 year old male who presents with a history of left renal mass. This was noted incidentally on CTA done in the context of recent NSTEMI. He presented for this in early January and has undergone thorough cardiac workup. On 3 months of ASA and Plavix. 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    ##RENAL MASS INITIAL ENCOUNTER##     Raúl Marie is a very pleasant 67 year old male who presents with a history of a Solid renal lesion/mass.  This was discovered incidentally.  Initially diagnosed about 2 week(s) ago.  The lesion is solid and enhancing.  The maximal dimension of the renal lesion is 2.0 centimeters and located on the left side.      Concerning  his renal function, his last SCr is   Lab Results   Component Value Date    CR 1.08 01/06/2023    CR 1.06 01/11/2011   .  he has the following risk factors for development of chronic kidney disease CAD.       he does have a history of previous abdominal or retroperitoneal surgery.  There is not a family history of renal cell cancer.  The patient does not have a history of smoking and currently is not smoking.    The mass/lesion is located in the Left side and is primarily located in the inter polar region.  The tumor is also 50 % endophytic and exophytic.  The mass/lesion primarily lies on the posterior surface.  With regard to the collecting system, the edge of the tumor arrives either abuts the collecting system or arrives within 4 mm of the collecting system.    ECOG Performance Score: 0 - Independent         Past Medical History:     Past Medical History:   Diagnosis Date     BPH (benign prostatic hyperplasia)      Glaucoma      Hemorrhoids, unspecified hemorrhoid type      IgA nephropathy      Sleep apnea    CAD  Recent MI  Fibrous  dysplasia         Past Surgical History:     Past Surgical History:   Procedure Laterality Date     APPENDECTOMY       COLONOSCOPY N/A 10/7/2022    Procedure: COLONOSCOPY, WITH POLYPECTOMY AND BIOPSY;  Surgeon: Sylvie Jj MD;  Location:  GI     CV CORONARY ANGIOGRAM N/A 1/4/2023    Procedure: Coronary Angiogram;  Surgeon: Jose Elias Wahl MD;  Location:  HEART CARDIAC CATH LAB     ESOPHAGOSCOPY, GASTROSCOPY, DUODENOSCOPY (EGD), COMBINED N/A 10/7/2022    Procedure: ESOPHAGOGASTRODUODENOSCOPY, WITH BIOPSY;  Surgeon: Sylvie Jj MD;  Location:  GI          Medications     Current Outpatient Medications   Medication     aspirin (ASA) 81 MG EC tablet     atorvastatin (LIPITOR) 40 MG tablet     clopidogrel (PLAVIX) 75 MG tablet     isosorbide mononitrate (IMDUR) 30 MG 24 hr tablet     latanoprostene bunod (VYZULTA) 0.024 % SOLN ophthalmic solution     metoprolol tartrate (LOPRESSOR) 25 MG tablet     nitroGLYcerin (NITROSTAT) 0.4 MG sublingual tablet     polyethylene glycol (MIRALAX) 17 GM/Dose powder     psyllium (METAMUCIL/KONSYL) Packet     SENNA-docusate sodium (SENNA S) 8.6-50 MG tablet     tamsulosin (FLOMAX) 0.4 MG capsule     No current facility-administered medications for this visit.          Family History:     Family History   Problem Relation Age of Onset     Diabetes Father      Colon Cancer Paternal Grandmother      No known family history of  malignancy.         Social History:     Social History     Socioeconomic History     Marital status: Single     Spouse name: Not on file     Number of children: Not on file     Years of education: Not on file     Highest education level: Not on file   Occupational History     Not on file   Tobacco Use     Smoking status: Never     Smokeless tobacco: Never   Substance and Sexual Activity     Alcohol use: Yes     Drug use: Yes     Types: Marijuana     Sexual activity: Not on file   Other Topics Concern     Not on file   Social History Narrative  "    Not on file     Social Determinants of Health     Financial Resource Strain: Not on file   Food Insecurity: Not on file   Transportation Needs: Not on file   Physical Activity: Not on file   Stress: Not on file   Social Connections: Not on file   Intimate Partner Violence: Not on file   Housing Stability: Not on file     Works with a house cleaning business         Allergies:   Chlorpheniramine-phenylephrine, Walnuts [nuts], and Wheat extract         Review of Systems:  From intake questionnaire     Skin: negative  Eyes: negative  Ears/Nose/Throat: negative  Respiratory: No shortness of breath, dyspnea on exertion, cough, or hemoptysis  Cardiovascular: No chest pain or palpitations  Gastrointestinal: negative; no nausea/vomiting, constipation or diarrhea  Genitourinary: as per HPI  Musculoskeletal: negative  Neurologic: negative  Psychiatric: negative  Hematologic/Lymphatic/Immunologic: negative  Endocrine: negative         Physical Exam:     Patient is a 67 year old  male   Vitals: Blood pressure 104/71, pulse 63, height 1.727 m (5' 8\"), weight 68.9 kg (152 lb), SpO2 98 %.  Constitutional: Body mass index is 23.11 kg/m .  Alert, no acute distress, oriented, conversant  Eyes: no scleral icterus; extraocular muscles intact, moist conjunctivae  Respiratory: no respiratory distress, or pursed lip breathing  Cardiovascular: pulses strong and intact; no obvious jugular venous distension present  Gastrointestinal: soft, nontender, no organomegaly or masses,   Musculoskeletal: extremities normal, no peripheral edema  Skin: no suspicious lesions or rashes  Neuro: Alert, oriented, speech and mentation normal  Psych: affect and mood normal, alert and oriented to person, place and time      Labs and Pathology:    I reviewed all applicable laboratory and pathology data and went over findings with patient  Significant for   Lab Results   Component Value Date    CR 1.08 01/06/2023    CR 1.09 01/04/2023    CR 1.41 12/27/2022 "    CR 1.00 10/13/2022    CR 1.32 10/10/2022    CR 1.13 10/09/2022    CR 1.23 10/08/2022    CR 1.18 10/07/2022    CR 1.19 10/05/2022    CR 1.06 01/11/2011       Imaging:    The following imaging exams were independently viewed and interpreted by me and discussed with patient:    CTA 1/19/2023  KIDNEYS/BLADDER: Left kidney suspicious heterogeneously enhancing mass medial mid kidney 2.0 x 1.9 x 1.7 cm. Several additional benign-appearing low-density lesions probable cysts. No stones or hydronephrosis.   Right kidney single benign-appearing low density lesion lower kidney probable cyst. No mass, stones, or hydronephrosis.  Bladder is normal.  IMPRESSION:  1.  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  2.  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  3.  Suspicious left renal mass concerning for renal cell carcinoma.      Outside and Past Medical records:    Review of the result(s) of each unique test - CTA, creat         Assessment and Plan:     Assessment: 67 year old male with incidentally noted 2 cm left posterior renal mass. This was noted in the context of workup for recent NSTEMI, for which he is on dual antiplatelet therapy for 3 months. We had an extensive discussion about the significance of a localized, enhancing kidney mass.  We discussed that approximately 80% of enhancing renal masses turn out to be kidney cancer upon removal, while 20% are found to be benign.  Although certain features such as size, gender and tumor characteristics can change these risks.    We discussed the role of renal mass biopsy including the fact that renal mass biopsy is safe with current techniques, it can be inaccurate and it can be non-diagnostic.  Furthermore, the majority of patients find they ultimately need to proceed with treatment anyway.      We discussed the options for treatment of a localized kidney mass including active surveillance, thermal ablation, and surgical extirpation.  Surgical  removal has the best track record and close to a 99% chance of cure for T1a lesions compared to 90% cure with thermal ablation and is generally preferred.  Furthermore, thermal ablation is not optimal for larger masses or centrally located masses.    We discussed the role of observation and the low risk of metastases associated with lesions less than 2-3 cm in size and the potential for slow/stable growth in many cases.  However, the unpredictable natural history of these lesions was mentioned as a drawback with this approach and the lack of effective therapy in the event of systemic progression.  In general, this approach is most favored for those with limited life expectancy and/or those with indeterminate (< 2-3 cm) renal masses.    Furthermore, we discussed the relative merits of partial nephrectomy vs. radical nephrectomy and the theoretical basis behind maximal nephron preservation.  There is some data suggesting there are long term survival advantages and equivalent cancer control with nephron sparing surgery.  We also discussed the advantages and disadvantages and roles of open surgery vs. laparoscopic (and Da Mechelle assisted) surgery. Risks and benefits of robotic partial nephrectomy were discussed in detail. Risks of surgery including bleeding, infection, MI, stroke, DVT/PE, bowel injury and injury to other surrounding structures were discussed. In addition, we discussed potential for positive surgical margins, urine leak, vascular injury, and potential need for radical nephrectomy or conversion to an open procedure. The anticipated post-operative course was explained as well as expectations for recovery. All questions were answered.      He would be an excellent candidate for robotic partial nephrectomy. That said, given his recent medical issues and 3 months of dual antiplatelet therapy, an initial interval of observation would be prudent. Will plan MRI in 3 months to better characterize this mass and  further discuss treatment options at that time.    Plan:  Follow-up 3 months with MRI and labs    Orders  Orders Placed This Encounter   Procedures     MR Renal wo & w Contrast     Basic metabolic panel [LAB15]     CBC with platelets [NAD482]     50 total minutes spent on the date of the encounter including direct interaction with the patient, performing chart review, documentation and further activities as noted above.    Jose Elias Cuevas MD  Urology  [facility]

## 2023-01-26 NOTE — TELEPHONE ENCOUNTER
Return call to Pt left message asking if CBD ok to use for sleep. Returned call left message informing him do not know what is in his CBD for ingredients and is it FDA approved? Otherwise can not tell him if ok with other medications.  JETHRO Flaherty RN

## 2023-01-26 NOTE — NURSING NOTE
Chief Complaint   Patient presents with     LT RENAL MASS     POST OPERATIVE URINARY RETENTION   3 times a night to urinate   Good flow stops about 3 times but stays the same .  Little dribble   Had mild heart attack first of the year .  Patient would also like to talked about renal scan with DR Cuevas.  John Giraldo, CMA

## 2023-01-26 NOTE — TELEPHONE ENCOUNTER
Return  Call to Pt informed him it would be good to review this medication with pharmacy like he did all his others. JETHRO Flaherty RN

## 2023-01-31 ENCOUNTER — HOSPITAL ENCOUNTER (OUTPATIENT)
Dept: CARDIAC REHAB | Facility: CLINIC | Age: 68
Discharge: HOME | End: 2023-01-31
Attending: STUDENT IN AN ORGANIZED HEALTH CARE EDUCATION/TRAINING PROGRAM
Payer: MEDICARE

## 2023-01-31 PROCEDURE — 93798 PHYS/QHP OP CAR RHAB W/ECG: CPT

## 2023-02-02 ENCOUNTER — TELEPHONE (OUTPATIENT)
Dept: CARDIOLOGY | Facility: CLINIC | Age: 68
End: 2023-02-02
Payer: MEDICARE

## 2023-02-02 NOTE — TELEPHONE ENCOUNTER
Pt wants to inform his providers of supplements he is taking andif any issue. Pt is taking in addition to numerous other supplemnts phosphorylated serine for sleep, and Ribos cardio that has D-ribose and L-carnitine in ingredients for heart health.   JETHRO Flaherty RN

## 2023-02-03 ENCOUNTER — HOSPITAL ENCOUNTER (OUTPATIENT)
Dept: CARDIAC REHAB | Facility: CLINIC | Age: 68
Discharge: HOME | End: 2023-02-03
Attending: STUDENT IN AN ORGANIZED HEALTH CARE EDUCATION/TRAINING PROGRAM
Payer: MEDICARE

## 2023-02-03 PROCEDURE — 93798 PHYS/QHP OP CAR RHAB W/ECG: CPT | Performed by: CLINICAL EXERCISE PHYSIOLOGIST

## 2023-02-06 ENCOUNTER — HOSPITAL ENCOUNTER (OUTPATIENT)
Dept: CARDIAC REHAB | Facility: CLINIC | Age: 68
Discharge: HOME | End: 2023-02-06
Attending: STUDENT IN AN ORGANIZED HEALTH CARE EDUCATION/TRAINING PROGRAM
Payer: MEDICARE

## 2023-02-06 PROCEDURE — 93798 PHYS/QHP OP CAR RHAB W/ECG: CPT

## 2023-02-10 ENCOUNTER — HOSPITAL ENCOUNTER (OUTPATIENT)
Dept: CARDIAC REHAB | Facility: CLINIC | Age: 68
Discharge: HOME | End: 2023-02-10
Attending: STUDENT IN AN ORGANIZED HEALTH CARE EDUCATION/TRAINING PROGRAM
Payer: MEDICARE

## 2023-02-10 PROCEDURE — 93798 PHYS/QHP OP CAR RHAB W/ECG: CPT

## 2023-02-13 ENCOUNTER — HOSPITAL ENCOUNTER (OUTPATIENT)
Dept: CARDIAC REHAB | Facility: CLINIC | Age: 68
Discharge: HOME | End: 2023-02-13
Attending: STUDENT IN AN ORGANIZED HEALTH CARE EDUCATION/TRAINING PROGRAM
Payer: MEDICARE

## 2023-02-13 PROCEDURE — 93798 PHYS/QHP OP CAR RHAB W/ECG: CPT

## 2023-02-22 ENCOUNTER — TELEPHONE (OUTPATIENT)
Dept: CARDIOLOGY | Facility: CLINIC | Age: 68
End: 2023-02-22
Payer: MEDICARE

## 2023-02-24 ENCOUNTER — VIRTUAL VISIT (OUTPATIENT)
Dept: CARDIOLOGY | Facility: CLINIC | Age: 68
End: 2023-02-24
Payer: MEDICARE

## 2023-02-24 DIAGNOSIS — N28.89 LEFT RENAL MASS: ICD-10-CM

## 2023-02-24 DIAGNOSIS — I77.3 FIBROMUSCULAR DYSPLASIA OF CAROTID ARTERY (H): ICD-10-CM

## 2023-02-24 DIAGNOSIS — I25.42 SPONTANEOUS DISSECTION OF CORONARY ARTERY: ICD-10-CM

## 2023-02-24 DIAGNOSIS — I21.4 NON-STEMI (NON-ST ELEVATED MYOCARDIAL INFARCTION) (H): Primary | ICD-10-CM

## 2023-02-24 PROCEDURE — 99214 OFFICE O/P EST MOD 30 MIN: CPT | Mod: VID | Performed by: NURSE PRACTITIONER

## 2023-02-24 NOTE — PROGRESS NOTES
Cardiology Virtual Visit Progress Note    Service Date: February 24, 2023  Primary Cardiologist: Dr. Muniz  Reason for visit: 1-month follow up     Mr. Raúl Marie is a 67 year old male who is being evaluated via a billable video visit.    Patient has given verbal consent for virtual visit?  Yes    How would you like to obtain your AVS? Malka Marie is a very pleasant 67 year old male with a past medical history notable for HLD, IgA nephropathy, glaucoma, recent severe GI bleed with recent hemorrhoidectomy, NSTEMI with SCAD, new diagnosis fibromuscular dysplasia.     Patient was recently admitted to [facility] on 1/4/2023 with chest pain and elevated troponin.  He presented with typical ACS, and underwent coronary angiography that same day.  Study revealed spontaneous coronary artery dissection with underlying coronary artery disease involving the diagonal, obtuse marginal, RPAV/RPL branches.     He was initiated on aspirin and Plavix for 3 months per the most recent JACC guidelines, with aspirin indefinitely thereafter.  He was also started on Imdur 30 mg daily.  Patient noted acute onset of visual changes after his coronary angiogram which prompted a CT scan of the head.  Imaging was negative for CVA, however showed a vascular appearance concerning for fibromuscular dysplasia in the carotid arteries.     He works with him [employer], and is on multiple vitamin and herbal supplements.  He was counseled regarding taking these supplements in addition to the new medications he is on while hospitalized.     His echocardiogram shows preserved EF at 55-60%, with normal LV wall motion without evidence of LV mass or tumor.  There is mild 1+ aortic regurgitation.    Interval 02/24/23    Overall, he is doing well.  He had a few additional questions regarding some lingering symptoms that we discussed today.  He has very mild chest discomfort remaining - is still taking the Imdur.  He mentions that it  continues to improve, could be an element of anxiety and worry that makes him more aware of how he feels all the time.  But in general, things are going well for him.  He is tolerating cardiac rehab - we discussed his physical activity limitations and the importance of not straining/bearing down with lifting.  He can mildly increase his weight capacity at cardiac rehab so long that he does not strain.    ASSESSMENT AND PLAN:    NSTEMI secondary to SCAD  SCAD of diagonal, OM and RPAV/RPL branches  Underlying coronary artery disease  Fibromuscular dysplasia, new diagnosis  - Recovering well without further e/o ongoing ischemia or infarct.  - LV function normal.  - FMD noted on CT of head/neck in the carotids without evidence of significant stenosis.  - In consideration of NSTEMI and underlying CAD, currently treated with DAPT x3 months.  - Outpatient genetic counseling scheduled.  - On Imdur 30 mg daily with slight, <1/10 residual chest discomfort.     Recent severe GI bleed, stable Hgb 12.4  Acute kidney injury, stable   Dyslipidemia, , hdl 47, tot chol 164- started on lipitor 40.  IgA nephropathy, with baseline creatinine about 1.1-1.2, stable.      Polypharmacy, Pt following with a naturopath for adrenal fatigue, mental fogginess etc.    __________________________________________________________________    Plan:  1. No changes made to the plan of care today.  2. Continue with outpatient cardiac rehab as scheduled, can mildly increase the weight that he lifts so long that he does not strain.  This was thoroughly discussed with the patient today.  He has very good insight about his body and current symptoms and understands this information well.  3. Follow-up with Dr. Muniz as scheduled in April.  4. Continue with genetic counseling appointment as scheduled.  __________________________________________________________________    Thank you for the opportunity to participate in this pleasant patient's care.   We  would be happy to see him sooner if needed for any concerns in the meantime.     ABIODUN Bennett, CNP   Nurse Practitioner  [facility]  Pager: [phone]   Text Page (8am - 5pm, M-F)    Provider location: Home  Patient location: Home  Total time on phone call: 12 minutes, 46 seconds.    40 total minutes was spent today including chart review, precharting, history and phone conversation, post visit documentation, and reviewing studies as outlined above.   Chart Review: hospital notes, cath, cardiac imaging, labs    Orders this Visit:  No orders of the defined types were placed in this encounter.    No orders of the defined types were placed in this encounter.    There are no discontinued medications.  No diagnosis found.    CURRENT MEDICATIONS:  Current Outpatient Medications   Medication Sig Dispense Refill     aspirin (ASA) 81 MG EC tablet Take 1 tablet (81 mg) by mouth daily 100 tablet 0     atorvastatin (LIPITOR) 40 MG tablet Take 1 tablet (40 mg) by mouth daily 90 tablet 3     clopidogrel (PLAVIX) 75 MG tablet Take 1 tablet (75 mg) by mouth daily 30 tablet 1     isosorbide mononitrate (IMDUR) 30 MG 24 hr tablet Take 1 tablet (30 mg) by mouth daily 30 tablet 1     latanoprostene bunod (VYZULTA) 0.024 % SOLN ophthalmic solution Place 1 drop into both eyes At Bedtime       metoprolol tartrate (LOPRESSOR) 25 MG tablet Take 0.5 tablets (12.5 mg) by mouth 2 times daily 60 tablet 3     nitroGLYcerin (NITROSTAT) 0.4 MG sublingual tablet For chest pain place 1 tablet under the tongue every 5 minutes for 3 doses. If symptoms persist 5 minutes after 1st dose call 911. 15 tablet 0     psyllium (METAMUCIL/KONSYL) Packet Take 1 packet by mouth daily 30 packet 0     tamsulosin (FLOMAX) 0.4 MG capsule Take 0.4 mg by mouth daily       polyethylene glycol (MIRALAX) 17 GM/Dose powder Take 17 g by mouth daily (Patient not taking: Reported on 2/24/2023) 510 g 0     SENNA-docusate sodium (SENNA S)  8.6-50 MG tablet Take 1-2 tablets by mouth At Bedtime (Patient not taking: Reported on 2/24/2023) 90 tablet 0       ALLERGIES  Allergies   Allergen Reactions     Chlorpheniramine-Phenylephrine Itching and Other (See Comments)     Other reaction(s): Other (See Comments)     Walnuts [Nuts]      Wheat Extract        PAST MEDICAL, SURGICAL, FAMILY HISTORY:  History was reviewed and updated as needed, see medical record.    SOCIAL HISTORY:  Social History     Socioeconomic History     Marital status: Single     Spouse name: Not on file     Number of children: Not on file     Years of education: Not on file     Highest education level: Not on file   Occupational History     Not on file   Tobacco Use     Smoking status: Never     Smokeless tobacco: Never   Substance and Sexual Activity     Alcohol use: Yes     Comment: occasional     Drug use: Not Currently     Types: Marijuana     Sexual activity: Not on file   Other Topics Concern     Not on file   Social History Narrative     Not on file     Social Determinants of Health     Financial Resource Strain: Not on file   Food Insecurity: Not on file   Transportation Needs: Not on file   Physical Activity: Not on file   Stress: Not on file   Social Connections: Not on file   Intimate Partner Violence: Not on file   Housing Stability: Not on file       Review of Systems:  Skin: negative  Eyes: negative  Ears/Nose/Throat: negative  Respiratory: No shortness of breath, dyspnea on exertion, cough, or hemoptysis  Cardiovascular: positive for mild residual chest discomfort - improving daily.  Gastrointestinal: negative  Genitourinary: negative  Musculoskeletal: negative  Neurologic: negative  Psychiatric: negative  Hematologic/Lymphatic/Immunologic: negative  Endocrine: negative    PSYCH: Alert and oriented times 3; coherent speech, normal   rate and volume, able to articulate logical thoughts, able   to abstract reason, no tangential thoughts, no hallucinations   or delusions. his  affect is normal  RESP: No cough, no audible wheezing, able to talk in full sentences    Remainder of the comprehensive physical exam is unable to be completed due to today's visit being completed via telephone call.    There were no vitals taken for this visit.   Wt Readings from Last 4 Encounters:   01/26/23 68.9 kg (152 lb)   01/17/23 72.1 kg (159 lb)   01/07/23 68.5 kg (151 lb 1.6 oz)   12/27/22 76.2 kg (168 lb)     Recent Lab Results:  LIPID RESULTS:  Lab Results   Component Value Date    CHOL 164 01/04/2023    HDL 47 01/04/2023     (H) 01/04/2023    TRIG 61 01/04/2023     LIVER ENZYME RESULTS:  Lab Results   Component Value Date    AST 27 01/04/2023    AST 27 01/11/2011    ALT 31 01/04/2023    ALT 41 01/11/2011     CBC RESULTS:  Lab Results   Component Value Date    WBC 5.7 01/06/2023    WBC 8.4 01/11/2011    RBC 4.83 01/06/2023    RBC 5.10 01/11/2011    HGB 12.4 (L) 01/06/2023    HGB 15.1 01/11/2011    HCT 38.5 (L) 01/06/2023    HCT 44.6 01/11/2011    MCV 80 01/06/2023    MCV 88 01/11/2011    MCH 25.7 (L) 01/06/2023    MCH 29.6 01/11/2011    MCHC 32.2 01/06/2023    MCHC 33.9 01/11/2011    RDW 23.3 (H) 01/06/2023    RDW 13.9 01/11/2011     01/06/2023     01/11/2011     BMP RESULTS:  Lab Results   Component Value Date     01/06/2023     01/11/2011    POTASSIUM 4.0 01/06/2023    POTASSIUM 4.0 01/11/2011    CHLORIDE 105 01/06/2023    CHLORIDE 105 01/11/2011    CO2 26 01/06/2023    CO2 25 01/11/2011    ANIONGAP 8 01/06/2023    ANIONGAP 10 01/11/2011    GLC 96 01/06/2023    GLC 95 01/11/2011    BUN 20 01/06/2023    BUN 24 01/11/2011    CR 1.08 01/06/2023    CR 1.06 01/11/2011    GFRESTIMATED 75 01/06/2023    GFRESTIMATED 73 01/11/2011    GFRESTBLACK 88 01/11/2011    SONA 9.3 01/06/2023    SONA 9.3 01/11/2011      A1C RESULTS:  Lab Results   Component Value Date    A1C 4.9 01/04/2023     INR RESULTS:  Lab Results   Component Value Date    INR 1.11 10/13/2022    INR 1.10 10/05/2022        CC  No referring provider defined for this encounter.    This note was completed in part using Dragon voice recognition software. Although reviewed after completion, some word and grammatical errors may occur.     Rúal is a 67 year old who is being evaluated via a billable video visit.      How would you like to obtain your AVS? Mail a copy  If the video visit is dropped, the invitation should be resent by: Text to cell phone: [phone]  Will anyone else be joining your video visit? No    Patient does not monitor BP at home.    Review Of Systems  Respiratory: No shortness of breath, dyspnea on exertion, cough, or hemoptysis  Cardiovascular: chest pain: mild  Endocrine: negative    Patient's phone #: [phone]    Hiwot Alvares LPN    Video-Visit Details    Type of service:  Video Visit   Originating Location (pt. Location): Home  Distant Location (provider location):  On-site  Platform used for Video Visit: David

## 2023-02-24 NOTE — LETTER
2/24/2023    [facility] [address]  [facility] [address]    RE: Raúl LAMBERT Frank       Dear Colleague,     I had the pleasure of seeing Raúl Marie in the [facility].      Cardiology Virtual Visit Progress Note    Service Date: February 24, 2023  Primary Cardiologist: Dr. Muniz  Reason for visit: 1-month follow up     Mr. Raúl Marie is a 67 year old male who is being evaluated via a billable video visit.    Patient has given verbal consent for virtual visit?  Yes    How would you like to obtain your AVS? MyChart     Raúl Marie is a very pleasant 67 year old male with a past medical history notable for HLD, IgA nephropathy, glaucoma, recent severe GI bleed with recent hemorrhoidectomy, NSTEMI with SCAD, new diagnosis fibromuscular dysplasia.     Patient was recently admitted to [facility] on 1/4/2023 with chest pain and elevated troponin.  He presented with typical ACS, and underwent coronary angiography that same day.  Study revealed spontaneous coronary artery dissection with underlying coronary artery disease involving the diagonal, obtuse marginal, RPAV/RPL branches.     He was initiated on aspirin and Plavix for 3 months per the most recent JACC guidelines, with aspirin indefinitely thereafter.  He was also started on Imdur 30 mg daily.  Patient noted acute onset of visual changes after his coronary angiogram which prompted a CT scan of the head.  Imaging was negative for CVA, however showed a vascular appearance concerning for fibromuscular dysplasia in the carotid arteries.     He works with him [employer], and is on multiple vitamin and herbal supplements.  He was counseled regarding taking these supplements in addition to the new medications he is on while hospitalized.     His echocardiogram shows preserved EF at 55-60%, with normal LV wall motion without evidence of LV mass or tumor.  There is mild 1+ aortic regurgitation.    Interval  02/24/23    Overall, he is doing well.  He had a few additional questions regarding some lingering symptoms that we discussed today.  He has very mild chest discomfort remaining - is still taking the Imdur.  He mentions that it continues to improve, could be an element of anxiety and worry that makes him more aware of how he feels all the time.  But in general, things are going well for him.  He is tolerating cardiac rehab - we discussed his physical activity limitations and the importance of not straining/bearing down with lifting.  He can mildly increase his weight capacity at cardiac rehab so long that he does not strain.    ASSESSMENT AND PLAN:    NSTEMI secondary to SCAD  SCAD of diagonal, OM and RPAV/RPL branches  Underlying coronary artery disease  Fibromuscular dysplasia, new diagnosis  - Recovering well without further e/o ongoing ischemia or infarct.  - LV function normal.  - FMD noted on CT of head/neck in the carotids without evidence of significant stenosis.  - In consideration of NSTEMI and underlying CAD, currently treated with DAPT x3 months.  - Outpatient genetic counseling scheduled.  - On Imdur 30 mg daily with slight, <1/10 residual chest discomfort.     Recent severe GI bleed, stable Hgb 12.4  Acute kidney injury, stable   Dyslipidemia, , hdl 47, tot chol 164- started on lipitor 40.  IgA nephropathy, with baseline creatinine about 1.1-1.2, stable.      Polypharmacy, Pt following with a naturopath for adrenal fatigue, mental fogginess etc.    __________________________________________________________________    Plan:  1. No changes made to the plan of care today.  2. Continue with outpatient cardiac rehab as scheduled, can mildly increase the weight that he lifts so long that he does not strain.  This was thoroughly discussed with the patient today.  He has very good insight about his body and current symptoms and understands this information well.  3. Follow-up with Dr. Muniz as scheduled  in April.  4. Continue with genetic counseling appointment as scheduled.  __________________________________________________________________    Thank you for the opportunity to participate in this pleasant patient's care.   We would be happy to see him sooner if needed for any concerns in the meantime.     ABIODUN Bennett, CNP   Nurse Practitioner  [facility]  Pager: [phone]   Text Page (8am - 5pm, M-F)    Provider location: Home  Patient location: Home  Total time on phone call: 12 minutes, 46 seconds.    40 total minutes was spent today including chart review, precharting, history and phone conversation, post visit documentation, and reviewing studies as outlined above.   Chart Review: hospital notes, cath, cardiac imaging, labs    Orders this Visit:  No orders of the defined types were placed in this encounter.    No orders of the defined types were placed in this encounter.    There are no discontinued medications.  No diagnosis found.    CURRENT MEDICATIONS:  Current Outpatient Medications   Medication Sig Dispense Refill     aspirin (ASA) 81 MG EC tablet Take 1 tablet (81 mg) by mouth daily 100 tablet 0     atorvastatin (LIPITOR) 40 MG tablet Take 1 tablet (40 mg) by mouth daily 90 tablet 3     clopidogrel (PLAVIX) 75 MG tablet Take 1 tablet (75 mg) by mouth daily 30 tablet 1     isosorbide mononitrate (IMDUR) 30 MG 24 hr tablet Take 1 tablet (30 mg) by mouth daily 30 tablet 1     latanoprostene bunod (VYZULTA) 0.024 % SOLN ophthalmic solution Place 1 drop into both eyes At Bedtime       metoprolol tartrate (LOPRESSOR) 25 MG tablet Take 0.5 tablets (12.5 mg) by mouth 2 times daily 60 tablet 3     nitroGLYcerin (NITROSTAT) 0.4 MG sublingual tablet For chest pain place 1 tablet under the tongue every 5 minutes for 3 doses. If symptoms persist 5 minutes after 1st dose call 911. 15 tablet 0     psyllium (METAMUCIL/KONSYL) Packet Take 1 packet by mouth daily 30 packet 0      tamsulosin (FLOMAX) 0.4 MG capsule Take 0.4 mg by mouth daily       polyethylene glycol (MIRALAX) 17 GM/Dose powder Take 17 g by mouth daily (Patient not taking: Reported on 2/24/2023) 510 g 0     SENNA-docusate sodium (SENNA S) 8.6-50 MG tablet Take 1-2 tablets by mouth At Bedtime (Patient not taking: Reported on 2/24/2023) 90 tablet 0       ALLERGIES  Allergies   Allergen Reactions     Chlorpheniramine-Phenylephrine Itching and Other (See Comments)     Other reaction(s): Other (See Comments)     Walnuts [Nuts]      Wheat Extract        PAST MEDICAL, SURGICAL, FAMILY HISTORY:  History was reviewed and updated as needed, see medical record.    SOCIAL HISTORY:  Social History     Socioeconomic History     Marital status: Single     Spouse name: Not on file     Number of children: Not on file     Years of education: Not on file     Highest education level: Not on file   Occupational History     Not on file   Tobacco Use     Smoking status: Never     Smokeless tobacco: Never   Substance and Sexual Activity     Alcohol use: Yes     Comment: occasional     Drug use: Not Currently     Types: Marijuana     Sexual activity: Not on file   Other Topics Concern     Not on file   Social History Narrative     Not on file     Social Determinants of Health     Financial Resource Strain: Not on file   Food Insecurity: Not on file   Transportation Needs: Not on file   Physical Activity: Not on file   Stress: Not on file   Social Connections: Not on file   Intimate Partner Violence: Not on file   Housing Stability: Not on file       Review of Systems:  Skin: negative  Eyes: negative  Ears/Nose/Throat: negative  Respiratory: No shortness of breath, dyspnea on exertion, cough, or hemoptysis  Cardiovascular: positive for mild residual chest discomfort - improving daily.  Gastrointestinal: negative  Genitourinary: negative  Musculoskeletal: negative  Neurologic: negative  Psychiatric: negative  Hematologic/Lymphatic/Immunologic:  negative  Endocrine: negative    PSYCH: Alert and oriented times 3; coherent speech, normal   rate and volume, able to articulate logical thoughts, able   to abstract reason, no tangential thoughts, no hallucinations   or delusions. his affect is normal  RESP: No cough, no audible wheezing, able to talk in full sentences    Remainder of the comprehensive physical exam is unable to be completed due to today's visit being completed via telephone call.    There were no vitals taken for this visit.   Wt Readings from Last 4 Encounters:   01/26/23 68.9 kg (152 lb)   01/17/23 72.1 kg (159 lb)   01/07/23 68.5 kg (151 lb 1.6 oz)   12/27/22 76.2 kg (168 lb)     Recent Lab Results:  LIPID RESULTS:  Lab Results   Component Value Date    CHOL 164 01/04/2023    HDL 47 01/04/2023     (H) 01/04/2023    TRIG 61 01/04/2023     LIVER ENZYME RESULTS:  Lab Results   Component Value Date    AST 27 01/04/2023    AST 27 01/11/2011    ALT 31 01/04/2023    ALT 41 01/11/2011     CBC RESULTS:  Lab Results   Component Value Date    WBC 5.7 01/06/2023    WBC 8.4 01/11/2011    RBC 4.83 01/06/2023    RBC 5.10 01/11/2011    HGB 12.4 (L) 01/06/2023    HGB 15.1 01/11/2011    HCT 38.5 (L) 01/06/2023    HCT 44.6 01/11/2011    MCV 80 01/06/2023    MCV 88 01/11/2011    MCH 25.7 (L) 01/06/2023    MCH 29.6 01/11/2011    MCHC 32.2 01/06/2023    MCHC 33.9 01/11/2011    RDW 23.3 (H) 01/06/2023    RDW 13.9 01/11/2011     01/06/2023     01/11/2011     BMP RESULTS:  Lab Results   Component Value Date     01/06/2023     01/11/2011    POTASSIUM 4.0 01/06/2023    POTASSIUM 4.0 01/11/2011    CHLORIDE 105 01/06/2023    CHLORIDE 105 01/11/2011    CO2 26 01/06/2023    CO2 25 01/11/2011    ANIONGAP 8 01/06/2023    ANIONGAP 10 01/11/2011    GLC 96 01/06/2023    GLC 95 01/11/2011    BUN 20 01/06/2023    BUN 24 01/11/2011    CR 1.08 01/06/2023    CR 1.06 01/11/2011    GFRESTIMATED 75 01/06/2023    GFRESTIMATED 73 01/11/2011    GFRESTBLACK  88 01/11/2011    SONA 9.3 01/06/2023    SONA 9.3 01/11/2011      A1C RESULTS:  Lab Results   Component Value Date    A1C 4.9 01/04/2023     INR RESULTS:  Lab Results   Component Value Date    INR 1.11 10/13/2022    INR 1.10 10/05/2022       CC  No referring provider defined for this encounter.    This note was completed in part using Dragon voice recognition software. Although reviewed after completion, some word and grammatical errors may occur.     Raúl is a 67 year old who is being evaluated via a billable video visit.      How would you like to obtain your AVS? Mail a copy  If the video visit is dropped, the invitation should be resent by: Text to cell phone: [phone]  Will anyone else be joining your video visit? No    Patient does not monitor BP at home.    Review Of Systems  Respiratory: No shortness of breath, dyspnea on exertion, cough, or hemoptysis  Cardiovascular: chest pain: mild  Endocrine: negative    Patient's phone #: [phone]    Hiwot Alvares LPN    Video-Visit Details    Type of service:  Video Visit   Originating Location (pt. Location): Home  Distant Location (provider location):  On-site  Platform used for Video Visit: David    Thank you for allowing me to participate in the care of your patient.      Sincerely,     ABIODUN Bennett [facility]  cc:   No referring provider defined for this encounter.

## 2023-02-27 ENCOUNTER — HOSPITAL ENCOUNTER (OUTPATIENT)
Dept: CARDIAC REHAB | Facility: CLINIC | Age: 68
Discharge: HOME | End: 2023-02-27
Attending: STUDENT IN AN ORGANIZED HEALTH CARE EDUCATION/TRAINING PROGRAM
Payer: MEDICARE

## 2023-02-27 PROCEDURE — 93798 PHYS/QHP OP CAR RHAB W/ECG: CPT | Performed by: REHABILITATION PRACTITIONER

## 2023-03-03 ENCOUNTER — HOSPITAL ENCOUNTER (OUTPATIENT)
Dept: CARDIAC REHAB | Facility: CLINIC | Age: 68
Discharge: HOME | End: 2023-03-03
Attending: STUDENT IN AN ORGANIZED HEALTH CARE EDUCATION/TRAINING PROGRAM
Payer: MEDICARE

## 2023-03-03 PROCEDURE — 93798 PHYS/QHP OP CAR RHAB W/ECG: CPT | Performed by: REHABILITATION PRACTITIONER

## 2023-03-06 ENCOUNTER — HOSPITAL ENCOUNTER (OUTPATIENT)
Dept: CARDIAC REHAB | Facility: CLINIC | Age: 68
Discharge: HOME | End: 2023-03-06
Attending: STUDENT IN AN ORGANIZED HEALTH CARE EDUCATION/TRAINING PROGRAM
Payer: MEDICARE

## 2023-03-06 PROCEDURE — 93798 PHYS/QHP OP CAR RHAB W/ECG: CPT | Performed by: REHABILITATION PRACTITIONER

## 2023-03-07 ENCOUNTER — NURSE TRIAGE (OUTPATIENT)
Dept: CARDIOLOGY | Facility: CLINIC | Age: 68
End: 2023-03-07
Payer: MEDICARE

## 2023-03-07 NOTE — TELEPHONE ENCOUNTER
(Please see previous note) Pt was at dentist attempted to call him x 2 went to voice mail. Will message provider when can Pt have dental work? Does Pt need pre treatment for antibiotics before procedures?  Did contact dental office at [phone] ext [phone]. Pt will reschedule appt. They are considering premedication for his peridontals disease also. Pt on Plavix and they expect an increased amount of bleeding. Informed them would ask provider at what point if Pt can hold Plavix he did not have stenting , was diagnosed SCAD. JETHRO Flaherty RN

## 2023-03-07 NOTE — TELEPHONE ENCOUNTER
"Received a call from the call center that patient is currently at the dentist and they are refusing treatment until given clearance to provide care.  Spoke to Raúl who says he needs dental clearance, including a letter, to have dental work done today. He says he forgot to mention that he has periodontal disease when he saw Dr. Muniz in the hospital. He is also needing antibiotics ordered prior to dental procedures.   He is requesting a message be sent to Dr. Muniz team for a return call to discuss at [phone].  Advised a message will be sent to team 7 for follow up.      1. REASON FOR CALL or QUESTION: \"What is your reason for calling today?\" or \"How can I best help you?\" or \"What question do you have that I can help answer?\"    "

## 2023-03-10 NOTE — TELEPHONE ENCOUNTER
Patient is calling again with questions below.  Will route to provider to advise.  Lillie Monroy RN on 3/10/2023 at 1:24 PM

## 2023-03-14 NOTE — TELEPHONE ENCOUNTER
Call out to dentist office ([facility] [place] location at [phone]) reviewed w/Jayla the below recommendations per JESUS Colmenares. Also called pt and reviewed this with him as well. Pt states the dentist told him they want a form completed. I informed pt I was unaware of that request and have not seen a form. If dentist requires a form they can fax to us. Nursing fax# provided to pt. Martha Sun RN on 3/14/2023 at 2:09 PM          Dedra Foy APRN CNP Charles, Danielle, RN  Caller: Unspecified (1 week ago,  2:34 PM)  He did not have a cardiac stent placed when he was treated in the hospital.  From my experience, I can't see why he would need to have prophylactic antibiotics for dental work.  The dental provider also should be able to complete their work while the patient stays on Plavix.  This would only increase the risk of gum bleeding, so general caution should be exercised during his dental procedure.     ABIODUN Bennett, CNP   Nurse Practitioner   [facility]   Pager: [phone]   Text Page (8am - 5pm, M-F)

## 2023-03-14 NOTE — TELEPHONE ENCOUNTER
Patient calling sent to Team 7 VM. Pt again looking for Cardiology rec's on proceeding with dental care. Need recommendations on when ok from cardiac perspective to have dental work (peridontal disease), whether prophylactic abx needed from cardiac standpoint and when he could safely hold Plavix if needed? Routed to JESUS Colmenares and Dr. Muniz. Martha Sun RN on 3/14/2023 at 10:55 AM

## 2023-03-17 ENCOUNTER — HOSPITAL ENCOUNTER (OUTPATIENT)
Dept: CARDIAC REHAB | Facility: CLINIC | Age: 68
Discharge: HOME | End: 2023-03-17
Attending: STUDENT IN AN ORGANIZED HEALTH CARE EDUCATION/TRAINING PROGRAM
Payer: MEDICARE

## 2023-03-17 PROCEDURE — 93798 PHYS/QHP OP CAR RHAB W/ECG: CPT | Performed by: OCCUPATIONAL THERAPIST

## 2023-03-20 ENCOUNTER — HOSPITAL ENCOUNTER (OUTPATIENT)
Dept: CARDIAC REHAB | Facility: CLINIC | Age: 68
Discharge: HOME | End: 2023-03-20
Attending: STUDENT IN AN ORGANIZED HEALTH CARE EDUCATION/TRAINING PROGRAM
Payer: MEDICARE

## 2023-03-20 PROCEDURE — 93798 PHYS/QHP OP CAR RHAB W/ECG: CPT | Performed by: CLINICAL EXERCISE PHYSIOLOGIST

## 2023-03-21 ENCOUNTER — HOSPITAL ENCOUNTER (OUTPATIENT)
Facility: CLINIC | Age: 68
Setting detail: OBSERVATION
Discharge: HOME | End: 2023-03-22
Attending: EMERGENCY MEDICINE | Admitting: STUDENT IN AN ORGANIZED HEALTH CARE EDUCATION/TRAINING PROGRAM
Payer: MEDICARE

## 2023-03-21 ENCOUNTER — APPOINTMENT (OUTPATIENT)
Dept: GENERAL RADIOLOGY | Facility: CLINIC | Age: 68
End: 2023-03-21
Attending: EMERGENCY MEDICINE
Payer: MEDICARE

## 2023-03-21 ENCOUNTER — TELEPHONE (OUTPATIENT)
Dept: CARDIOLOGY | Facility: CLINIC | Age: 68
End: 2023-03-21
Payer: MEDICARE

## 2023-03-21 DIAGNOSIS — R07.9 CHEST PAIN, UNSPECIFIED TYPE: ICD-10-CM

## 2023-03-21 DIAGNOSIS — I77.3 FIBROMUSCULAR DYSPLASIA OF CAROTID ARTERY (H): Primary | ICD-10-CM

## 2023-03-21 DIAGNOSIS — I21.4 NON-STEMI (NON-ST ELEVATED MYOCARDIAL INFARCTION) (H): ICD-10-CM

## 2023-03-21 LAB
ANION GAP SERPL CALCULATED.3IONS-SCNC: 7 MMOL/L (ref 7–15)
ATRIAL RATE - MUSE: 65 BPM
BASOPHILS # BLD AUTO: 0 10E3/UL (ref 0–0.2)
BASOPHILS NFR BLD AUTO: 1 %
BUN SERPL-MCNC: 22.7 MG/DL (ref 8–23)
CALCIUM SERPL-MCNC: 9.2 MG/DL (ref 8.8–10.2)
CHLORIDE SERPL-SCNC: 99 MMOL/L (ref 98–107)
CREAT SERPL-MCNC: 1.33 MG/DL (ref 0.67–1.17)
DEPRECATED HCO3 PLAS-SCNC: 27 MMOL/L (ref 22–29)
DIASTOLIC BLOOD PRESSURE - MUSE: NORMAL MMHG
EOSINOPHIL # BLD AUTO: 0.1 10E3/UL (ref 0–0.7)
EOSINOPHIL NFR BLD AUTO: 2 %
ERYTHROCYTE [DISTWIDTH] IN BLOOD BY AUTOMATED COUNT: 16.2 % (ref 10–15)
GFR SERPL CREATININE-BSD FRML MDRD: 59 ML/MIN/1.73M2
GLUCOSE SERPL-MCNC: 102 MG/DL (ref 70–99)
HCT VFR BLD AUTO: 36.8 % (ref 40–53)
HGB BLD-MCNC: 12.3 G/DL (ref 13.3–17.7)
HOLD SPECIMEN: NORMAL
IMM GRANULOCYTES # BLD: 0 10E3/UL
IMM GRANULOCYTES NFR BLD: 0 %
INTERPRETATION ECG - MUSE: NORMAL
LYMPHOCYTES # BLD AUTO: 1.6 10E3/UL (ref 0.8–5.3)
LYMPHOCYTES NFR BLD AUTO: 26 %
MCH RBC QN AUTO: 29.5 PG (ref 26.5–33)
MCHC RBC AUTO-ENTMCNC: 33.4 G/DL (ref 31.5–36.5)
MCV RBC AUTO: 88 FL (ref 78–100)
MONOCYTES # BLD AUTO: 0.5 10E3/UL (ref 0–1.3)
MONOCYTES NFR BLD AUTO: 8 %
NEUTROPHILS # BLD AUTO: 4 10E3/UL (ref 1.6–8.3)
NEUTROPHILS NFR BLD AUTO: 63 %
NRBC # BLD AUTO: 0 10E3/UL
NRBC BLD AUTO-RTO: 0 /100
P AXIS - MUSE: 32 DEGREES
PLATELET # BLD AUTO: 174 10E3/UL (ref 150–450)
POTASSIUM SERPL-SCNC: 4.3 MMOL/L (ref 3.4–5.3)
PR INTERVAL - MUSE: 210 MS
QRS DURATION - MUSE: 96 MS
QT - MUSE: 402 MS
QTC - MUSE: 418 MS
R AXIS - MUSE: 6 DEGREES
RBC # BLD AUTO: 4.17 10E6/UL (ref 4.4–5.9)
SODIUM SERPL-SCNC: 133 MMOL/L (ref 136–145)
SYSTOLIC BLOOD PRESSURE - MUSE: NORMAL MMHG
T AXIS - MUSE: 26 DEGREES
TROPONIN T SERPL HS-MCNC: 17 NG/L
TROPONIN T SERPL HS-MCNC: 18 NG/L
TROPONIN T SERPL HS-MCNC: 18 NG/L
VENTRICULAR RATE- MUSE: 65 BPM
WBC # BLD AUTO: 6.3 10E3/UL (ref 4–11)

## 2023-03-21 PROCEDURE — 99223 1ST HOSP IP/OBS HIGH 75: CPT | Mod: A1 | Performed by: STUDENT IN AN ORGANIZED HEALTH CARE EDUCATION/TRAINING PROGRAM

## 2023-03-21 PROCEDURE — 99285 EMERGENCY DEPT VISIT HI MDM: CPT | Mod: 25

## 2023-03-21 PROCEDURE — G0378 HOSPITAL OBSERVATION PER HR: HCPCS

## 2023-03-21 PROCEDURE — 258N000003 HC RX IP 258 OP 636: Performed by: STUDENT IN AN ORGANIZED HEALTH CARE EDUCATION/TRAINING PROGRAM

## 2023-03-21 PROCEDURE — 93005 ELECTROCARDIOGRAM TRACING: CPT

## 2023-03-21 PROCEDURE — 36415 COLL VENOUS BLD VENIPUNCTURE: CPT | Performed by: EMERGENCY MEDICINE

## 2023-03-21 PROCEDURE — 84484 ASSAY OF TROPONIN QUANT: CPT | Performed by: EMERGENCY MEDICINE

## 2023-03-21 PROCEDURE — 82310 ASSAY OF CALCIUM: CPT | Performed by: EMERGENCY MEDICINE

## 2023-03-21 PROCEDURE — 250N000013 HC RX MED GY IP 250 OP 250 PS 637: Performed by: STUDENT IN AN ORGANIZED HEALTH CARE EDUCATION/TRAINING PROGRAM

## 2023-03-21 PROCEDURE — 250N000013 HC RX MED GY IP 250 OP 250 PS 637: Performed by: EMERGENCY MEDICINE

## 2023-03-21 PROCEDURE — 71046 X-RAY EXAM CHEST 2 VIEWS: CPT

## 2023-03-21 PROCEDURE — 36415 COLL VENOUS BLD VENIPUNCTURE: CPT | Performed by: STUDENT IN AN ORGANIZED HEALTH CARE EDUCATION/TRAINING PROGRAM

## 2023-03-21 PROCEDURE — 84484 ASSAY OF TROPONIN QUANT: CPT | Performed by: STUDENT IN AN ORGANIZED HEALTH CARE EDUCATION/TRAINING PROGRAM

## 2023-03-21 PROCEDURE — 85018 HEMOGLOBIN: CPT | Performed by: EMERGENCY MEDICINE

## 2023-03-21 RX ORDER — CLOPIDOGREL BISULFATE 75 MG/1
75 TABLET ORAL DAILY
Status: DISCONTINUED | OUTPATIENT
Start: 2023-03-22 | End: 2023-03-22 | Stop reason: HOSPADM

## 2023-03-21 RX ORDER — MAGNESIUM HYDROXIDE/ALUMINUM HYDROXICE/SIMETHICONE 120; 1200; 1200 MG/30ML; MG/30ML; MG/30ML
30 SUSPENSION ORAL EVERY 4 HOURS PRN
Status: DISCONTINUED | OUTPATIENT
Start: 2023-03-21 | End: 2023-03-22 | Stop reason: HOSPADM

## 2023-03-21 RX ORDER — TAMSULOSIN HYDROCHLORIDE 0.4 MG/1
0.4 CAPSULE ORAL DAILY
Status: DISCONTINUED | OUTPATIENT
Start: 2023-03-22 | End: 2023-03-22 | Stop reason: HOSPADM

## 2023-03-21 RX ORDER — ACETAMINOPHEN 325 MG/1
650 TABLET ORAL EVERY 6 HOURS PRN
Status: DISCONTINUED | OUTPATIENT
Start: 2023-03-21 | End: 2023-03-22 | Stop reason: HOSPADM

## 2023-03-21 RX ORDER — NITROGLYCERIN 0.4 MG/1
0.4 TABLET SUBLINGUAL EVERY 5 MIN PRN
Status: DISCONTINUED | OUTPATIENT
Start: 2023-03-21 | End: 2023-03-22 | Stop reason: HOSPADM

## 2023-03-21 RX ORDER — ATORVASTATIN CALCIUM 40 MG/1
40 TABLET, FILM COATED ORAL DAILY
Status: DISCONTINUED | OUTPATIENT
Start: 2023-03-22 | End: 2023-03-22 | Stop reason: HOSPADM

## 2023-03-21 RX ORDER — SODIUM CHLORIDE 9 MG/ML
INJECTION, SOLUTION INTRAVENOUS CONTINUOUS
Status: DISCONTINUED | OUTPATIENT
Start: 2023-03-21 | End: 2023-03-22 | Stop reason: HOSPADM

## 2023-03-21 RX ORDER — ISOSORBIDE MONONITRATE 30 MG/1
30 TABLET, EXTENDED RELEASE ORAL DAILY
Status: DISCONTINUED | OUTPATIENT
Start: 2023-03-22 | End: 2023-03-22 | Stop reason: HOSPADM

## 2023-03-21 RX ORDER — ACETAMINOPHEN 650 MG/1
650 SUPPOSITORY RECTAL EVERY 6 HOURS PRN
Status: DISCONTINUED | OUTPATIENT
Start: 2023-03-21 | End: 2023-03-22 | Stop reason: HOSPADM

## 2023-03-21 RX ORDER — ASPIRIN 81 MG/1
162 TABLET, CHEWABLE ORAL ONCE
Status: COMPLETED | OUTPATIENT
Start: 2023-03-21 | End: 2023-03-21

## 2023-03-21 RX ORDER — ASPIRIN 81 MG/1
81 TABLET ORAL DAILY
Status: DISCONTINUED | OUTPATIENT
Start: 2023-03-22 | End: 2023-03-22 | Stop reason: HOSPADM

## 2023-03-21 RX ADMIN — SODIUM CHLORIDE: 9 INJECTION, SOLUTION INTRAVENOUS at 20:40

## 2023-03-21 RX ADMIN — ASPIRIN 81 MG CHEWABLE TABLET 162 MG: 81 TABLET CHEWABLE at 15:16

## 2023-03-21 RX ADMIN — METOPROLOL TARTRATE 12.5 MG: 25 TABLET, FILM COATED ORAL at 20:40

## 2023-03-21 ASSESSMENT — ACTIVITIES OF DAILY LIVING (ADL)
ADLS_ACUITY_SCORE: 35
ADLS_ACUITY_SCORE: 35
ADLS_ACUITY_SCORE: 33
ADLS_ACUITY_SCORE: 33
ADLS_ACUITY_SCORE: 35

## 2023-03-21 NOTE — ED PROVIDER NOTES
History     Chief Complaint:  Chest Pain       HPI   Raúl Marie is a 67 year old male on Plavix with a history of NSTEMI, SCAD, CKD, and hyperlipidemia who presents with chest pain. The patient states that after leaving the hospital after his SCAD he had some chest pain. That pain has increased over the past 1.5 months. Then 5 days ago he had a bad episode of chest pain more noticeable than previously. He had been doing interval training which was unclear in his discharge instructions and he ran faster than he was supposed to. He was running 7 mph when he was supposed to go a max of 3.5 mph. That pain lasted until he went to sleep. Over the weekend the pain did increase more quickly and yesterday while at rehab the pain kept increasing. Also last night he felt more heart pain which is better now but is still a 2/10. He denies headache, extremity numbness, cough, diaphoresis, diarrhea, nausea, or vomiting. When he had his SCAD he did not get a stent and was just put on thinners.      Independent Historian:   None - Patient Only    Review of External Notes: Yes, appreciate office visits with cardiology earlier this year, robust cardiac history and history of SCAD also reviewed      Allergies:  Chlorpheniramine-Phenylephrine  Ferrous Sulfate  Nuts  Wheat Extract      Medications:  Flomax  Aspirin 81mg  Lipitor  Plavix  Imdur  Lopressor  Nitrostat     Past Medical History:     BPH  Glaucoma  Hemorrhoids  IgA nephropathy  Sleep apnea   Hyperlipidemia   Depression  Vitamin D deficiency  Cyst of kidney  Chronic back pain   CKD, stage III  Calculus of kidney   Shingles  Anxiety  Toe pain, chronic, right  NSTEMI  SCAD     Past Surgical History:    Appendectomy  Tonsillectomy   Adenoidectomy  Cystoscopy with urethral dilation  Laser eye surgery, right  Kidney stone surgery      Family History:    Father: diabetes, dementia  Mother: dementia    Social History:  The patient presents to the ED alone.    Physical Exam  "    Patient Vitals for the past 24 hrs:   BP Temp Temp src Pulse Resp SpO2 Height Weight   03/21/23 1642 -- -- -- 61 11 97 % -- --   03/21/23 1630 115/77 -- -- 60 18 97 % -- --   03/21/23 1600 112/73 -- -- 58 21 96 % -- --   03/21/23 1544 -- -- -- 60 10 96 % -- --   03/21/23 1530 117/75 -- -- 60 17 97 % -- --   03/21/23 1519 -- -- -- 61 13 97 % -- --   03/21/23 1518 111/69 -- -- 62 -- -- -- --   03/21/23 1354 127/65 98.2  F (36.8  C) Temporal 64 16 97 % 1.74 m (5' 8.5\") 70.3 kg (155 lb)        Physical Exam  Vitals: reviewed by me  General: Pt seen on [facility] [place], cooperative, and alert to conversation, very kind, reading a book, nondiaphoretic  Eyes: Tracking well, clear conjunctiva BL  ENT: MMM, midline trachea.   Lungs: No tachypnea, no accessory muscle use. No respiratory distress.   CV: Rate as above  MSK: no joint effusion.  No evidence of trauma  Skin: No rash  Neuro: Clear speech and no facial droop.  Psych: Not RIS, no e/o AH/VH      Emergency Department Course     ECG results from 03/21/23   EKG 12 lead     Value    Systolic Blood Pressure     Diastolic Blood Pressure     Ventricular Rate 65    Atrial Rate 65    WV Interval 210    QRS Duration 96        QTc 418    P Axis 32    R AXIS 6    T Axis 26    Interpretation ECG      Sinus rhythm with 1st degree A-V block  Otherwise normal ECG  When compared with ECG of 05-JAN-2023 06:51,  Questionable change in QRS axis  Confirmed by GENERATED REPORT, COMPUTER ([id number]),  Gloria Martines ([id number]) on 3/21/2023 5:50:19 PM           Imaging:  XR Chest 2 Views   Final Result   IMPRESSION: No acute cardiopulmonary disease.      SAIRA MARIE MD            SYSTEM ID:  [id number]         Report per radiology    Laboratory:  Labs Ordered and Resulted from Time of ED Arrival to Time of ED Departure   BASIC METABOLIC PANEL - Abnormal       Result Value    Sodium 133 (*)     Potassium 4.3      Chloride 99      Carbon Dioxide (CO2) 27      Anion Gap 7      " Urea Nitrogen 22.7      Creatinine 1.33 (*)     Calcium 9.2      Glucose 102 (*)     GFR Estimate 59 (*)    CBC WITH PLATELETS AND DIFFERENTIAL - Abnormal    WBC Count 6.3      RBC Count 4.17 (*)     Hemoglobin 12.3 (*)     Hematocrit 36.8 (*)     MCV 88      MCH 29.5      MCHC 33.4      RDW 16.2 (*)     Platelet Count 174      % Neutrophils 63      % Lymphocytes 26      % Monocytes 8      % Eosinophils 2      % Basophils 1      % Immature Granulocytes 0      NRBCs per 100 WBC 0      Absolute Neutrophils 4.0      Absolute Lymphocytes 1.6      Absolute Monocytes 0.5      Absolute Eosinophils 0.1      Absolute Basophils 0.0      Absolute Immature Granulocytes 0.0      Absolute NRBCs 0.0     TROPONIN T, HIGH SENSITIVITY - Normal    Troponin T, High Sensitivity 18     TROPONIN T, HIGH SENSITIVITY - Normal    Troponin T, High Sensitivity 17        Emergency Department Course & Assessments:    Interventions:  Medications   aspirin (ASA) chewable tablet 162 mg (162 mg Oral $Given 3/21/23 1516)        Assessments:  1500 Obtained the patients history and performed initial exam  1744 Updated the patient on findings and next steps    Independent Interpretation (X-rays, CTs, rhythm strip):  Yes, I independently reviewed patient's chest x-ray, no obvious pneumothorax.    Consultations/Discussion of Management or Tests:   ED Course as of 03/21/23 1743   Tue Mar 21, 2023   1653 Spoke to cardiology NP about the patients findings and next steps   1738 Talked to cardiology again who updated me on their wants for the patient   1742 Talked to Dr. La about the patients findings and possible admittance       Disposition:  The patient was admitted to the hospital under the care of Dr. La.     Impression & Plan      Medical Decision Making:  This is a very pleasant 67-year-old male who presents the emergency room with exertional chest pain that is still different than his previous pain that he has had throughout his recovery from  his recent scad.  He clearly did have an increase in his chest pain during exertion, and has not yet gone back to his baseline.  His troponins are flat, he did receive an aspirin here, and his x-ray is reassuring as is his EKG.  Given that he is still having persistent pain however, and with a recent cardiac event, I did speak to the cardiology team, and both the cardiology team, under Dr. Quiles, and myself agreed that the patient should stay for at least 1 day for additional troponins and to reassess his pain in the morning and possibly even with a cardiology consult to determine if additional testing is necessary.  The patient of course would prefer to go home, but states he will come into the hospital since his pain is worse, and understands the plan of care.  Will monitor very carefully till next inpatient bed is available    Diagnosis:    ICD-10-CM    1. Chest pain, unspecified type  R07.9              Scribe Disclosure:  I, Jose Palmer, am serving as a scribe at 3:03 PM on 3/21/2023 to document services personally performed by Norm French MD based on my observations and the provider's statements to me.     3/21/2023   Norm French MD Fitzgerald, Michael Maxwell Kreofsky, MD  03/21/23 2122

## 2023-03-21 NOTE — TELEPHONE ENCOUNTER
Pt says Friday  On Friday he had increased his exercise and had pain that lasted into evening. When he had cardiac rehab today he did not push as hard , but he still notices some pain of an intensity of 0 .5 to 1.5. Pt states he can push on area and recreate pain , but feels he has 2 different issues. Informed Pt he has 2 options. He can try something for muscle pain and see if that elevates the issue, and if not when at home he can sit down and try a ,nitroglycerin tablet to see if that helps. Pt voices concern for another heart attack. Discussed the fact that his is scads and FMD. He is already on Imdur and if believes this is same heart pain as heart attack he has nitroglycerin, and if needs to use up to 2 with relief then needs to go to ER for further evaluation. JETHRO Flaherty RN

## 2023-03-21 NOTE — TELEPHONE ENCOUNTER
Pt called back he is very concerned about continued pain and now feels it is wors, and now says maybe it was worse also at cardiac rehab. Pt did not want to try nitor he felt ER would be the better place to go. Informed Pt if he felt he needed ER Nurse understood his need. Pt said he would go to ER. JETHRO Flaherty RN

## 2023-03-21 NOTE — ED TRIAGE NOTES
Pt presents with increasing chest pain. Pt reports having MI two month, thought to be SCAD. Pt reports accidentally misreading cardiac rehab instructions few days ago. Pt reports running for 1-2 min for which he has been having chest pain since. Worse over past three days.      Triage Assessment     Row Name 03/21/23 1355       Triage Assessment (Adult)    Airway WDL WDL       Cardiac WDL    Cardiac WDL chest pain       Chest Pain Assessment    Chest Pain Location anterior chest, left       Peripheral/Neurovascular WDL    Peripheral Neurovascular WDL WDL       Cognitive/Neuro/Behavioral WDL    Cognitive/Neuro/Behavioral WDL WDL

## 2023-03-21 NOTE — H&P
[facility]    History and Physical - Hospitalist Service       Date of Admission:  3/21/2023    Assessment & Plan      Raúl Marie is a 67 year old male with past medical history significant for hyperlipidemia, IgA nephropathy, prior GI bleed, and recent NSTEMI with a diagnosis of a CAD and subsequent diagnosis of fibromuscular dysplasia admitted on 3/21/2023 with chest pain.     Chest pain   History of NSTEMI secondary to CKD  Coronary artery disease  Fibromuscular dysplasia  Patient was admitted to the hospital on 1/4/2023 with chest pain and elevated troponin he underwent coronary angiography which revealed spontaneous coronary artery dissection with underlying coronary artery disease involving the diagonal, obtuse marginal, RPAV/RPL branches.  He was treated with aspirin and Plavix for 3 months, was started on Imdur. Pt has had some on-going mild chest pain since that time, but now presents with significantly worsening pain after running on the treadmill 5 days ago.   * He is hemodynamically stable in the ED. Initial troponin was 18 and repeat was stable at 17. EKG showed sinus rhythm with first degree AV block and no acute ST segment elevation.   - Admit to observation   - Cardiology contacted in the ED and recommended admission for serial trop and chest pain rule out  - Formal cardiology consult placed   - Cardiac monitoring   - Serial troponin   - Defer additional imaging/stress testing to cardiology   - Continue PTA ASA, atorvastatin, plavix, metoprolol and Imdur   - Pain control as needed     MONROE   Mild hyponatremia   Creatinine is elevated to 1.33. Baseline creatinine appears to be around 1.0-1.1. Sodium is 133.   - Continue IV fluids and monitor renal function/BMP     Mild anemia  Hx of GI bleed   Hemoglobin is 12.3 on admission. Stable from prior.   - Monitor     Diet: Regular diet   DVT Prophylaxis: Pneumatic Compression Devices  Loredo Catheter: Not present  Lines: None      Cardiac Monitoring: None  Code Status: Full Code     Clinically Significant Risk Factors Present on Admission                # Drug Induced Platelet Defect: home medication list includes an antiplatelet medication                Disposition Plan      Expected Discharge Date: 03/22/2023                  Danette La MD  Hospitalist Service  [facility]  Securely message with [employer] (more info)  Text page via [facility] Paging/Directory     ______________________________________________________________________    Chief Complaint   Chest pain     History is obtained from the patient    History of Present Illness   Raúl Marie is a 67 year old male who Zentz to the emergency department with chest pain.  Does have a history of NSTEMI secondary to a CAD about months ago.  That he has had some mild chest discomfort intermittently however on Friday 3/17 he was running on the treadmill and had recurrent more severe chest pain.  He notes that he was fully running faster than he should have been based on his cardiac rehab instructions.  The chest pain is described as a pressure-like sensation over the mid chest.  It does not radiate to the jaw or the arm.  He has no associated shortness of breath or lower extremity edema.  Since Friday the chest pain has been waxing and waning in severity.  He did go to cardiac rehab yesterday 3/20 and was doing some mild intensity exercise and did have some recurrence of the chest pain with that.    Currently he does still have some mild ongoing chest pain but reports that it is very mild and improved since he presented to the emergency department.      Past Medical History    Past Medical History:   Diagnosis Date     Blood in semen      BPH (benign prostatic hyperplasia)      Glaucoma      Hemorrhoids, unspecified hemorrhoid type      Hernia, abdominal      IgA nephropathy      Mumps        Past Surgical History   Past Surgical History:   Procedure  Laterality Date     APPENDECTOMY       COLONOSCOPY N/A 10/07/2022    Procedure: COLONOSCOPY, WITH POLYPECTOMY AND BIOPSY;  Surgeon: Sylvie Jj MD;  Location:  GI     CV CORONARY ANGIOGRAM N/A 01/04/2023    Procedure: Coronary Angiogram;  Surgeon: Jose Elias Wahl MD;  Location:  HEART CARDIAC CATH LAB     ESOPHAGOSCOPY, GASTROSCOPY, DUODENOSCOPY (EGD), COMBINED N/A 10/07/2022    Procedure: ESOPHAGOGASTRODUODENOSCOPY, WITH BIOPSY;  Surgeon: Sylvie Jj MD;  Location:  GI       Prior to Admission Medications   Prior to Admission Medications   Prescriptions Last Dose Informant Patient Reported? Taking?   aspirin (ASA) 81 MG EC tablet 3/21/2023 at AM  No Yes   Sig: Take 1 tablet (81 mg) by mouth daily   atorvastatin (LIPITOR) 40 MG tablet 3/21/2023 at AM  No Yes   Sig: Take 1 tablet (40 mg) by mouth daily   clopidogrel (PLAVIX) 75 MG tablet 3/21/2023 at AM  No Yes   Sig: Take 1 tablet (75 mg) by mouth daily   isosorbide mononitrate (IMDUR) 30 MG 24 hr tablet 3/21/2023 at AM  No Yes   Sig: Take 1 tablet (30 mg) by mouth daily   latanoprostene bunod (VYZULTA) 0.024 % SOLN ophthalmic solution 3/20/2023 at HS Self Yes Yes   Sig: Place 1 drop into both eyes At Bedtime   metoprolol tartrate (LOPRESSOR) 25 MG tablet 3/21/2023 at AM  No Yes   Sig: Take 0.5 tablets (12.5 mg) by mouth 2 times daily   nitroGLYcerin (NITROSTAT) 0.4 MG sublingual tablet Unknown at prn  No Yes   Sig: For chest pain place 1 tablet under the tongue every 5 minutes for 3 doses. If symptoms persist 5 minutes after 1st dose call 911.   psyllium (METAMUCIL/KONSYL) Packet 3/21/2023 at AM Self No Yes   Sig: Take 1 packet by mouth daily   tamsulosin (FLOMAX) 0.4 MG capsule 3/21/2023 at AM Self Yes Yes   Sig: Take 0.4 mg by mouth daily      Facility-Administered Medications: None        Review of Systems    The 10 point Review of Systems is negative other than noted in the HPI or here.     Physical Exam   Vital Signs: Temp: 98.2  F  (36.8  C) Temp src: Temporal BP: 121/83 Pulse: 62   Resp: 18 SpO2: 95 %      Weight: 155 lbs 0 oz    Constitutional: Awake, alert, cooperative, no apparent distress.  Eyes: Conjunctiva and pupils examined and normal.  HEENT: Moist mucous membranes, normal dentition.  Respiratory: Clear to auscultation bilaterally, no crackles or wheezing.  Cardiovascular: Regular rate and rhythm, normal S1 and S2, and no murmur noted.  Skin: No rashes, no cyanosis, no edema.  Musculoskeletal: No joint swelling, erythema or tenderness.  Neurologic: Cranial nerves 2-12 intact, normal strength and sensation.  Psychiatric: Alert, oriented to person, place and time, no obvious anxiety or depression.      Medical Decision Making       75 MINUTES SPENT BY ME on the date of service doing chart review, history, exam, documentation & further activities per the note.          Data     I have personally reviewed the following data over the past 24 hrs:    6.3  \   12.3 (L)   / 174     133 (L) 99 22.7 /  102 (H)   4.3 27 1.33 (H) \       Trop: 17 BNP: N/A       Imaging results reviewed over the past 24 hrs:   Recent Results (from the past 24 hour(s))   XR Chest 2 Views    Narrative    XR CHEST 2 VIEWS   3/21/2023 3:13 PM     HISTORY: Chest pain    COMPARISON: Chest radiograph 1/4/2023      Impression    IMPRESSION: No acute cardiopulmonary disease.    SAIRA MARIE MD         SYSTEM ID:  [id number]

## 2023-03-21 NOTE — ED NOTES
[facility]  ED Nurse Handoff Report    ED Chief complaint: Chest Pain      ED Diagnosis:   Final diagnoses:   Chest pain, unspecified type       Code Status: hospitalist to address    Allergies:   Allergies   Allergen Reactions    Chlorpheniramine-Phenylephrine Itching and Other (See Comments)     Other reaction(s): Other (See Comments)  Other reaction(s): Runny Nose    Ferrous Sulfate      Other reaction(s): constipation    Nuts      Other reaction(s): Unknown    Wheat Extract        Patient Story: Pt presents with increasing chest pain. Pt reports having MI two month, thought to be SCAD. Pt reports accidentally misreading cardiac rehab instructions few days ago. Pt reports running for 1-2 min for which he has been having chest pain since. Worse over past three days.  Focused Assessment:  Pt is alert and oriented. Respirations are even, easy, and unlabored. Pt states pain 2/10. Denies N/V/D    Treatments and/or interventions provided: Blood work, iv access, cardiac monitor  Patient's response to treatments and/or interventions: remains same    To be done/followed up on inpatient unit:  inpatient orders    Does this patient have any cognitive concerns?:  NA    Activity level - Baseline/Home:  Independent  Activity Level - Current:   Independent    Patient's Preferred language: English   Needed?: No    Isolation: None  Infection: Not Applicable  Patient tested for COVID 19 prior to admission: NO  Bariatric?: No    Vital Signs:   Vitals:    03/21/23 1600 03/21/23 1630 03/21/23 1642 03/21/23 1700   BP: 112/73 115/77  121/83   Pulse: 58 60 61 60   Resp: 21 18 11 16   Temp:       TempSrc:       SpO2: 96% 97% 97% 96%   Weight:       Height:           Cardiac Rhythm:     Was the PSS-3 completed:   Yes  What interventions are required if any?               Family Comments:   OBS brochure/video discussed/provided to patient/family: N/A              Name of person given brochure if not patient:                Relationship to patient:     For the majority of the shift this patient's behavior was Green.   Behavioral interventions performed were .    ED NURSE PHONE NUMBER: *[phone]

## 2023-03-21 NOTE — TELEPHONE ENCOUNTER
Patient call routed to incorrect team. Pt VM states would like call back to discuss some mild chest pain symptoms he has been experiencing intermittently. Routed to JESUS Colmenares, nurse team. Martha Sun RN on 3/21/2023 at 10:02 AM

## 2023-03-21 NOTE — PHARMACY-ADMISSION MEDICATION HISTORY
Pharmacy Medication History  Admission medication history interview status for the 3/21/2023  admission is complete. See EPIC admission navigator for prior to admission medications     Location of Interview: Patient room  Medication history sources: Patient, Surescripts and Care Everywhere    Significant changes made to the medication list:  None    In the past week, patient estimated taking medication this percent of the time: greater than 90%    Additional medication history information:   Patient is aware that our pharmacy does not stock Vyzulta and states he is fine going without tonight.   Patient takes supplements, did not have the list with him, but is fine going without during hospital stay.    Time spent in this activity: 20 minutes    Prior to Admission medications    Medication Sig Last Dose Taking? Auth Provider Long Term End Date   aspirin (ASA) 81 MG EC tablet Take 1 tablet (81 mg) by mouth daily 3/21/2023 at AM Yes Shirley Tompkins MD No    atorvastatin (LIPITOR) 40 MG tablet Take 1 tablet (40 mg) by mouth daily 3/21/2023 at AM Yes Dedra Foy APRN CNP Yes    clopidogrel (PLAVIX) 75 MG tablet Take 1 tablet (75 mg) by mouth daily 3/21/2023 at AM Yes Dedra Foy APRN CNP Yes    isosorbide mononitrate (IMDUR) 30 MG 24 hr tablet Take 1 tablet (30 mg) by mouth daily 3/21/2023 at AM Yes Dedra Foy APRN CNP Yes    latanoprostene bunod (VYZULTA) 0.024 % SOLN ophthalmic solution Place 1 drop into both eyes At Bedtime 3/20/2023 at HS Yes Unknown, Entered By History     metoprolol tartrate (LOPRESSOR) 25 MG tablet Take 0.5 tablets (12.5 mg) by mouth 2 times daily 3/21/2023 at AM Yes eDdra Foy APRN CNP Yes    nitroGLYcerin (NITROSTAT) 0.4 MG sublingual tablet For chest pain place 1 tablet under the tongue every 5 minutes for 3 doses. If symptoms persist 5 minutes after 1st dose call 911. Unknown at prn Yes Shirley Tompkins MD Yes    psyllium (METAMUCIL/KONSYL) Packet Take 1  packet by mouth daily 3/21/2023 at AM Yes Aldair Bartlett MD     tamsulosin (FLOMAX) 0.4 MG capsule Take 0.4 mg by mouth daily 3/21/2023 at AM Yes Unknown, Entered By History         The information provided in this note is only as accurate as the sources available at the time of update(s)

## 2023-03-22 ENCOUNTER — APPOINTMENT (OUTPATIENT)
Dept: CARDIOLOGY | Facility: CLINIC | Age: 68
End: 2023-03-22
Attending: INTERNAL MEDICINE
Payer: MEDICARE

## 2023-03-22 VITALS
HEIGHT: 68 IN | DIASTOLIC BLOOD PRESSURE: 58 MMHG | WEIGHT: 156 LBS | SYSTOLIC BLOOD PRESSURE: 112 MMHG | BODY MASS INDEX: 23.64 KG/M2 | TEMPERATURE: 98.4 F | HEART RATE: 58 BPM | OXYGEN SATURATION: 97 % | RESPIRATION RATE: 18 BRPM

## 2023-03-22 LAB
ANION GAP SERPL CALCULATED.3IONS-SCNC: 5 MMOL/L (ref 7–15)
BUN SERPL-MCNC: 19.4 MG/DL (ref 8–23)
CALCIUM SERPL-MCNC: 9.1 MG/DL (ref 8.8–10.2)
CHLORIDE SERPL-SCNC: 104 MMOL/L (ref 98–107)
CREAT SERPL-MCNC: 1.16 MG/DL (ref 0.67–1.17)
D DIMER PPP FEU-MCNC: <0.27 UG/ML FEU (ref 0–0.5)
DEPRECATED HCO3 PLAS-SCNC: 29 MMOL/L (ref 22–29)
GFR SERPL CREATININE-BSD FRML MDRD: 69 ML/MIN/1.73M2
GLUCOSE SERPL-MCNC: 100 MG/DL (ref 70–99)
LVEF ECHO: NORMAL
POTASSIUM SERPL-SCNC: 4.1 MMOL/L (ref 3.4–5.3)
SODIUM SERPL-SCNC: 138 MMOL/L (ref 136–145)

## 2023-03-22 PROCEDURE — 36415 COLL VENOUS BLD VENIPUNCTURE: CPT | Performed by: PHYSICIAN ASSISTANT

## 2023-03-22 PROCEDURE — 99223 1ST HOSP IP/OBS HIGH 75: CPT | Performed by: INTERNAL MEDICINE

## 2023-03-22 PROCEDURE — 93306 TTE W/DOPPLER COMPLETE: CPT

## 2023-03-22 PROCEDURE — 250N000013 HC RX MED GY IP 250 OP 250 PS 637: Performed by: STUDENT IN AN ORGANIZED HEALTH CARE EDUCATION/TRAINING PROGRAM

## 2023-03-22 PROCEDURE — 93306 TTE W/DOPPLER COMPLETE: CPT | Mod: 26 | Performed by: INTERNAL MEDICINE

## 2023-03-22 PROCEDURE — 36415 COLL VENOUS BLD VENIPUNCTURE: CPT | Performed by: INTERNAL MEDICINE

## 2023-03-22 PROCEDURE — 258N000003 HC RX IP 258 OP 636: Performed by: STUDENT IN AN ORGANIZED HEALTH CARE EDUCATION/TRAINING PROGRAM

## 2023-03-22 PROCEDURE — 99239 HOSP IP/OBS DSCHRG MGMT >30: CPT | Performed by: PHYSICIAN ASSISTANT

## 2023-03-22 PROCEDURE — 250N000013 HC RX MED GY IP 250 OP 250 PS 637: Performed by: PHYSICIAN ASSISTANT

## 2023-03-22 PROCEDURE — G0378 HOSPITAL OBSERVATION PER HR: HCPCS

## 2023-03-22 PROCEDURE — 85379 FIBRIN DEGRADATION QUANT: CPT | Performed by: INTERNAL MEDICINE

## 2023-03-22 PROCEDURE — 80048 BASIC METABOLIC PNL TOTAL CA: CPT | Performed by: PHYSICIAN ASSISTANT

## 2023-03-22 RX ORDER — ISOSORBIDE MONONITRATE 30 MG/1
30 TABLET, EXTENDED RELEASE ORAL ONCE
Status: COMPLETED | OUTPATIENT
Start: 2023-03-22 | End: 2023-03-22

## 2023-03-22 RX ORDER — ISOSORBIDE MONONITRATE 60 MG/1
60 TABLET, EXTENDED RELEASE ORAL DAILY
Qty: 30 TABLET | Refills: 0 | Status: SHIPPED | OUTPATIENT
Start: 2023-03-22 | End: 2023-07-07

## 2023-03-22 RX ADMIN — METOPROLOL TARTRATE 12.5 MG: 25 TABLET, FILM COATED ORAL at 10:16

## 2023-03-22 RX ADMIN — ASPIRIN 81 MG: 81 TABLET, COATED ORAL at 08:00

## 2023-03-22 RX ADMIN — SODIUM CHLORIDE: 9 INJECTION, SOLUTION INTRAVENOUS at 06:24

## 2023-03-22 RX ADMIN — CLOPIDOGREL BISULFATE 75 MG: 75 TABLET ORAL at 08:00

## 2023-03-22 RX ADMIN — ATORVASTATIN CALCIUM 40 MG: 40 TABLET, FILM COATED ORAL at 08:00

## 2023-03-22 RX ADMIN — TAMSULOSIN HYDROCHLORIDE 0.4 MG: 0.4 CAPSULE ORAL at 08:00

## 2023-03-22 RX ADMIN — ISOSORBIDE MONONITRATE 30 MG: 30 TABLET, EXTENDED RELEASE ORAL at 09:55

## 2023-03-22 RX ADMIN — ISOSORBIDE MONONITRATE 30 MG: 30 TABLET, EXTENDED RELEASE ORAL at 08:00

## 2023-03-22 ASSESSMENT — ACTIVITIES OF DAILY LIVING (ADL)
ADLS_ACUITY_SCORE: 32
ADLS_ACUITY_SCORE: 32
ADLS_ACUITY_SCORE: 33
ADLS_ACUITY_SCORE: 33
ADLS_ACUITY_SCORE: 34
ADLS_ACUITY_SCORE: 34
ADLS_ACUITY_SCORE: 33
ADLS_ACUITY_SCORE: 33

## 2023-03-22 NOTE — PROGRESS NOTES
[facility]  Hospitalist Progress Note    Assessment & Plan   Raúl Marie is a 67 year old male who was admitted on 3/21/2023.     Past medical history significant for CAD with recent NESTMI (2/2 CKD), HTN, HLP, IgA nephropathy with CKD, Fibromuscular dysplasia, BPH, History of GI bleed who was registered to short-stay/observation due to chest pain.      Notably, patient was admitted to the hospital on 1/4/2023 with chest pain and elevated troponin.  He underwent coronary angiography which revealed spontaneous coronary artery dissection with underlying coronary artery disease involving the diagonal, obtuse marginal, RPAV/RPL branches.  He was treated with aspirin and Plavix for 3 months, and was started on Imdur.     Patient has had some on-going mild chest pain since that time, but presented to the ED with significantly worsening pain after running on the treadmill 5 days prior to admission. He was hemodynamically stable in the ED.  Initial troponin was 18 and on repeat was stable at 17. EKG showed sinus rhythm with first degree AV block and no acute ST segment elevation.  Cardiology contacted in the ED and recommended admission for serial trop and chest pain rule out.    Chest pain   History of NSTEMI secondary to CKD  Coronary artery disease  Fibromuscular dysplasia   - Formal cardiology consult requested:   --Discussed with Dr. Lloyd of Cardiology this morning.     --Proceed with ECHO and increase Imdur to 60 mg/d (will give additional 30 mg this AM).  If ECHO unremarkable patient will discharge.    - Telemetry monitoring.  - Serial troponin:   --18-->17-->18.  - Continued on PTA ASA 81 mg/d, atorvastatin 40 mg/d, plavix 75 mg/d, metoprolol 12.5 mg BID.    - Pain control as needed.    MONROE   Mild hyponatremia   Creatinine is elevated to 1.33. Baseline creatinine appears to be around 1.0-1.1. Sodium is 133.   - Continue IV fluids with NS at 100 ml/hr.    - BMP ordered for this AM and  review once completed.      Mild anemia  Hx of GI bleed   Hemoglobin is 12.3 on admission. Stable from prior.   - Monitor PRN.    - Resumed on PTA ASA.        Clinically Significant Risk Factors Present on Admission                # Drug Induced Platelet Defect: home medication list includes an antiplatelet medication                  Diet: Combination Diet Regular Diet Adult; No Caffeine Diet     DVT Prophylaxis: Low Risk/Ambulatory with no VTE prophylaxis indicated   Loredo Catheter: Not present  Lines: None     Cardiac Monitoring: ACTIVE order. Indication: Chest pain/ ACS rule out (24 hours)  Code Status: Full Code      Disposition Plan    Likely discharging home today.  Awaiting ECHO and monitoring of BP with increased Imdur dose.      The patient's care was discussed with the Bedside Nurse, Patient and Cardiology and Charge Nurse.      The patient has been discussed with Dr. Ball, who agrees with the assessment and plan at this time.    James Gallego PA-C  [facility]  Securely message with the Vocera Web Console (learn more here)  Text page via [employer] Paging/Directory      Interval History   Discussed patient's plan with Cardiology prior to seeing the patient.      Patient was resting in bed upon arrival.  He denied fever, chills, shortness of breath or abdominal pain.  He has some slight chest discomfort but stated it has improved.  Reviewed plan to increase PTA Imdur to 60 mg/d and spoke about symptoms of low blood pressure.  Discussed plan to obtain an ECHO.      We reviewed his lab results (creatinine and sodium) and plan to repeat lab this AM.  Phlebotomist arrived as I was finishing my physical exam.      -Data reviewed today: I reviewed all new labs and imaging results over the last 24 hours. I personally reviewed no images or EKG's today.    Physical Exam   /72 (BP Location: Right arm, Patient Position: Sitting, Cuff Size: Adult Small)   Pulse 53   Temp 97.5  " F (36.4  C) (Oral)   Resp 17   Ht 1.727 m (5' 8\")   Wt 70.8 kg (156 lb)   SpO2 99%   BMI 23.72 kg/m        Constitutional: Awake, alert, cooperative, no apparent distress.   ENT: Normocephalic, without obvious abnormality, atraumatic, oral pharynx with moist mucus membranes, tonsils without erythema or exudates.  Neck: Supple, symmetrical, trachea midline, no adenopathy.  Pulmonary: No increased work of breathing, good air exchange, clear to auscultation bilaterally, no crackles or wheezing.  Cardiovascular: Regular rate and rhythm, normal S1 and S2, no S3 or S4, and no murmur noted.  GI: Normal bowel sounds, soft, non-distended, non-tender.  Skin/Integumen: Visualized skin appeared clear.  Neuro: CN II-XII grossly intact.  Psych:  Alert and oriented x 3. Normal affect.  Extremities: No lower extremity edema noted, and calves are non-TTP bilaterally.       Medical Decision Making       Please see A&P for additional details of medical decision making.  Greater than 35 MINUTES SPENT BY ME on the date of service doing chart review, history, exam, documentation & further activities per the note.         Medications     sodium chloride 100 mL/hr at 03/22/23 0624       aspirin  81 mg Oral Daily     atorvastatin  40 mg Oral Daily     clopidogrel  75 mg Oral Daily     isosorbide mononitrate  30 mg Oral Once     isosorbide mononitrate  30 mg Oral Daily     metoprolol tartrate  12.5 mg Oral BID     tamsulosin  0.4 mg Oral Daily       Data   Recent Labs   Lab 03/21/23  1400   WBC 6.3   HGB 12.3*   MCV 88      *   POTASSIUM 4.3   CHLORIDE 99   CO2 27   BUN 22.7   CR 1.33*   ANIONGAP 7   SONA 9.2   *       Recent Results (from the past 24 hour(s))   XR Chest 2 Views    Narrative    XR CHEST 2 VIEWS   3/21/2023 3:13 PM     HISTORY: Chest pain    COMPARISON: Chest radiograph 1/4/2023      Impression    IMPRESSION: No acute cardiopulmonary disease.    SAIRA MARIE MD         SYSTEM ID:  [id number]       "

## 2023-03-22 NOTE — CONSULTS
Cardiology Consultation      Raúl Marie MRN# [id number]   YOB: 1955 Age: 67 year old   Date of Admission: 3/21/2023     Reason for consult:  History of spontaneous coronary dissection, recurrent chest discomfort           Assessment and Plan:     67-year-old gentleman with a past medical history significant for a non-ST elevation myocardial infarction in January 2023 secondary to spontaneous coronary dissection involving the diagonal/OM/RPAV/RPL vessels who was readmitted to [facility] on 3/21/2023 with recurrent chest discomfort after performing exercise of higher intensity than usual.  Cardiac troponins are negative.  Symptoms have improved this morning although they are still present.    IMPRESSION:    1. Non-ST elevation myocardial infarction in January 2023 secondary to spontaneous coronary dissection.  2. Evidence of fibromuscular dysplasia involving the carotids on a CT of the head and neck.  3. CTA of the abdomen and pelvis in January 2023 which demonstrated a suspicious left renal mass concerning for renal cell carcinoma.  4. Recurrent chest discomfort in the setting of physical activity.    PLAN  -It is possible that the patient's recurrent symptoms were related to more intense exercise than would be normally recommended in the setting of incompletely healed scad.  His cardiac troponins are negative which is reassuring.  -I will obtain an echocardiogram to assess for new wall motion abnormalities.  As we discussed, conservative therapy is recommended per the ACC guidelines and we will continue medical therapy unless he develops refractory symptoms.  -To that effect I have recommended we increase his isosorbide mononitrate dose at 60 mg daily.  His other medications will be continued as prescribed.    > 80 minutes spent reviewing previous records including diagnostic imaging, pre and post charting as well as interviewing the patient and discussing further diagnostic  "testing.         Chief Complaint:   Chest Pain           History of Present Illness:   This patient is a 67 year old male who was seen by Dr. Muniz in consultation in January of this year at which point he presented with a non-ST elevation myocardial infarction that was thought to be due to spontaneous coronary artery dissection involving the obtuse marginal/diagonal/RPL/RA PV vessels.  Left ventricular systolic function was normal and he was started on appropriate medical therapy including DAPT and isosorbide mononitrate.    He has undergone evaluation for fibromuscular dysplasia and has been found to have findings suggestive of this condition involving the carotid arteries.  A CTA of the abdomen demonstrated a possible left renal mass for which a work-up is ongoing.    He has been participating in cardiac rehab since discharge and has been experiencing intermittent chest discomfort.  He did run a higher intensity than normal on Friday and started experiencing severe chest discomfort that was similar to what brought him to the emergency department in January.  Since then, his chest discomfort has waxed and waned but due to his symptoms he presented to the emergency department for further evaluation.    Cardiac troponins are negative.  Today he feels better overall from a cardiac standpoint.         Physical Exam:   Vitals were reviewed  Blood pressure 122/72, pulse 53, temperature 97.5  F (36.4  C), temperature source Oral, resp. rate 17, height 1.727 m (5' 8\"), weight 70.8 kg (156 lb), SpO2 99 %.  Temperatures:  Current - Temp: 97.5  F (36.4  C); Max - Temp  Av.8  F (36.6  C)  Min: 97.5  F (36.4  C)  Max: 98.2  F (36.8  C)  Respiration range: Resp  Av  Min: 10  Max: 24  Pulse range: Pulse  Av.1  Min: 53  Max: 64  Blood pressure range: Systolic (24hrs), Av , Min:110 , Max:134   ; Diastolic (24hrs), Av, Min:64, Max:86    Pulse oximetry range: SpO2  Av.4 %  Min: 95 %  Max: 99 " %    Intake/Output Summary (Last 24 hours) at 3/22/2023 1005  Last data filed at 3/21/2023 2100  Gross per 24 hour   Intake 200 ml   Output --   Net 200 ml     Constitutional:   awake, alert, cooperative, no apparent distress, and appears stated age     Eyes:   Lids and lashes normal, pupils equal, round and reactive to light, extra ocular muscles intact, sclera clear, conjunctiva normal     Neck:   supple, symmetrical, trachea midline, no JVD     Back:   symmetric     Lungs:   No increased work of breathing, good air exchange, clear to auscultation bilaterally, no crackles or wheezing       Cardiovascular:   Normal apical impulse, regular rate and rhythm, normal S1 and S2, no S3 or S4, and no murmur noted.      Abdomen:   non-tender     Musculoskeletal:   motor strength is 5 out of 5 all extremities bilaterally     Neurologic:   Grossly nonfocal     Skin:   no bruising or bleeding     Additional findings:     No edema                 Past Medical History:   I have reviewed this patient's past medical history  Past Medical History:   Diagnosis Date     Blood in semen      BPH (benign prostatic hyperplasia)      Glaucoma      Hemorrhoids, unspecified hemorrhoid type      Hernia, abdominal      IgA nephropathy      Mumps              Past Surgical History:   I have reviewed this patient's past surgical history  Past Surgical History:   Procedure Laterality Date     APPENDECTOMY       COLONOSCOPY N/A 10/07/2022    Procedure: COLONOSCOPY, WITH POLYPECTOMY AND BIOPSY;  Surgeon: Sylvie Jj MD;  Location:  GI     CV CORONARY ANGIOGRAM N/A 01/04/2023    Procedure: Coronary Angiogram;  Surgeon: Jose Elias Wahl MD;  Location:  HEART CARDIAC CATH LAB     ESOPHAGOSCOPY, GASTROSCOPY, DUODENOSCOPY (EGD), COMBINED N/A 10/07/2022    Procedure: ESOPHAGOGASTRODUODENOSCOPY, WITH BIOPSY;  Surgeon: Sylvie Jj MD;  Location:  GI               Social History:   I have reviewed this patient's social  history  Social History     Tobacco Use     Smoking status: Never     Smokeless tobacco: Never   Substance Use Topics     Alcohol use: Yes     Comment: occasional             Family History:   I have reviewed this patient's family history  Family History   Problem Relation Age of Onset     Diabetes Father      Colon Cancer Paternal Grandmother              Allergies:     Allergies   Allergen Reactions     Chlorpheniramine-Phenylephrine Itching and Other (See Comments)     Other reaction(s): Other (See Comments)  Other reaction(s): Runny Nose     Ferrous Sulfate      Other reaction(s): constipation     Nuts      Other reaction(s): Unknown     Wheat Extract              Medications:   I have reviewed this patient's current medications  Medications Prior to Admission   Medication Sig Dispense Refill Last Dose     aspirin (ASA) 81 MG EC tablet Take 1 tablet (81 mg) by mouth daily 100 tablet 0 3/21/2023 at AM     atorvastatin (LIPITOR) 40 MG tablet Take 1 tablet (40 mg) by mouth daily 90 tablet 3 3/21/2023 at AM     clopidogrel (PLAVIX) 75 MG tablet Take 1 tablet (75 mg) by mouth daily 30 tablet 1 3/21/2023 at AM     isosorbide mononitrate (IMDUR) 30 MG 24 hr tablet Take 1 tablet (30 mg) by mouth daily 30 tablet 1 3/21/2023 at AM     latanoprostene bunod (VYZULTA) 0.024 % SOLN ophthalmic solution Place 1 drop into both eyes At Bedtime   3/20/2023 at HS     metoprolol tartrate (LOPRESSOR) 25 MG tablet Take 0.5 tablets (12.5 mg) by mouth 2 times daily 60 tablet 3 3/21/2023 at AM     nitroGLYcerin (NITROSTAT) 0.4 MG sublingual tablet For chest pain place 1 tablet under the tongue every 5 minutes for 3 doses. If symptoms persist 5 minutes after 1st dose call 911. 15 tablet 0 Unknown at prn     psyllium (METAMUCIL/KONSYL) Packet Take 1 packet by mouth daily 30 packet 0 3/21/2023 at AM     tamsulosin (FLOMAX) 0.4 MG capsule Take 0.4 mg by mouth daily   3/21/2023 at AM     Current Facility-Administered Medications Ordered in  Epic   Medication Dose Route Frequency Last Rate Last Admin     acetaminophen (TYLENOL) tablet 650 mg  650 mg Oral Q6H PRN        Or     acetaminophen (TYLENOL) Suppository 650 mg  650 mg Rectal Q6H PRN         alum & mag hydroxide-simethicone (MAALOX) suspension 30 mL  30 mL Oral Q4H PRN         aspirin EC tablet 81 mg  81 mg Oral Daily   81 mg at 03/22/23 0800     atorvastatin (LIPITOR) tablet 40 mg  40 mg Oral Daily   40 mg at 03/22/23 0800     clopidogrel (PLAVIX) tablet 75 mg  75 mg Oral Daily   75 mg at 03/22/23 0800     isosorbide mononitrate (IMDUR) 24 hr tablet 30 mg  30 mg Oral Daily   30 mg at 03/22/23 0800     metoprolol tartrate (LOPRESSOR) half-tab 12.5 mg  12.5 mg Oral BID   12.5 mg at 03/21/23 2040     nitroGLYcerin (NITROSTAT) sublingual tablet 0.4 mg  0.4 mg Sublingual Q5 Min PRN         sodium chloride 0.9% infusion   Intravenous Continuous 100 mL/hr at 03/22/23 0624 New Bag at 03/22/23 0624     tamsulosin (FLOMAX) capsule 0.4 mg  0.4 mg Oral Daily   0.4 mg at 03/22/23 0800     No current [facility]-ordered outpatient medications on file.             Review of Systems:     The 10 point Review of Systems is negative other than noted in the HPI            Data:   All laboratory data reviewed  Results for orders placed or performed during the hospital encounter of 03/21/23 (from the past 24 hour(s))   Troponin T, High Sensitivity   Result Value Ref Range    Troponin T, High Sensitivity 18 <=22 ng/L   Basic metabolic panel   Result Value Ref Range    Sodium 133 (L) 136 - 145 mmol/L    Potassium 4.3 3.4 - 5.3 mmol/L    Chloride 99 98 - 107 mmol/L    Carbon Dioxide (CO2) 27 22 - 29 mmol/L    Anion Gap 7 7 - 15 mmol/L    Urea Nitrogen 22.7 8.0 - 23.0 mg/dL    Creatinine 1.33 (H) 0.67 - 1.17 mg/dL    Calcium 9.2 8.8 - 10.2 mg/dL    Glucose 102 (H) 70 - 99 mg/dL    GFR Estimate 59 (L) >60 mL/min/1.73m2   CBC with platelets + differential    Narrative    The following orders were created for panel order CBC with  platelets + differential.  Procedure                               Abnormality         Status                     ---------                               -----------         ------                     CBC with platelets and d...[[id number]]  Abnormal            Final result                 Please view results for these tests on the individual orders.   [place] Draw    Narrative    The following orders were created for panel order [place] Draw.  Procedure                               Abnormality         Status                     ---------                               -----------         ------                     Extra Blue Top Tube[[id number]]                              Final result                 Please view results for these tests on the individual orders.   CBC with platelets and differential   Result Value Ref Range    WBC Count 6.3 4.0 - 11.0 10e3/uL    RBC Count 4.17 (L) 4.40 - 5.90 10e6/uL    Hemoglobin 12.3 (L) 13.3 - 17.7 g/dL    Hematocrit 36.8 (L) 40.0 - 53.0 %    MCV 88 78 - 100 fL    MCH 29.5 26.5 - 33.0 pg    MCHC 33.4 31.5 - 36.5 g/dL    RDW 16.2 (H) 10.0 - 15.0 %    Platelet Count 174 150 - 450 10e3/uL    % Neutrophils 63 %    % Lymphocytes 26 %    % Monocytes 8 %    % Eosinophils 2 %    % Basophils 1 %    % Immature Granulocytes 0 %    NRBCs per 100 WBC 0 <1 /100    Absolute Neutrophils 4.0 1.6 - 8.3 10e3/uL    Absolute Lymphocytes 1.6 0.8 - 5.3 10e3/uL    Absolute Monocytes 0.5 0.0 - 1.3 10e3/uL    Absolute Eosinophils 0.1 0.0 - 0.7 10e3/uL    Absolute Basophils 0.0 0.0 - 0.2 10e3/uL    Absolute Immature Granulocytes 0.0 <=0.4 10e3/uL    Absolute NRBCs 0.0 10e3/uL   Extra Blue Top Tube   Result Value Ref Range    Hold Specimen JI    EKG 12 lead   Result Value Ref Range    Systolic Blood Pressure  mmHg    Diastolic Blood Pressure  mmHg    Ventricular Rate 65 BPM    Atrial Rate 65 BPM    IA Interval 210 ms    QRS Duration 96 ms     ms    QTc 418 ms    P Axis 32 degrees    R AXIS 6 degrees     T Axis 26 degrees    Interpretation ECG       Sinus rhythm with 1st degree A-V block  Otherwise normal ECG  When compared with ECG of 05-JAN-2023 06:51,  Questionable change in QRS axis  Confirmed by GENERATED REPORT, COMPUTER ([id number]),  Gloria Martines ([id number]) on 3/21/2023 5:50:19 PM     XR Chest 2 Views    Narrative    XR CHEST 2 VIEWS   3/21/2023 3:13 PM     HISTORY: Chest pain    COMPARISON: Chest radiograph 1/4/2023      Impression    IMPRESSION: No acute cardiopulmonary disease.    SAIRA MARIE MD         SYSTEM ID:  [id number]   Troponin T, High Sensitivity   Result Value Ref Range    Troponin T, High Sensitivity 17 <=22 ng/L   Troponin T, High Sensitivity   Result Value Ref Range    Troponin T, High Sensitivity 18 <=22 ng/L     EKG results: Sinus rhythm with nonspecific ST segment changes.

## 2023-03-22 NOTE — PROGRESS NOTES
RECEIVING UNIT ED HANDOFF REVIEW    ED Nurse Handoff Report was reviewed by: Shawn Babin RN on March 21, 2023 at 7:31 PM

## 2023-03-22 NOTE — PROGRESS NOTES
Observation goals  PRIOR TO DISCHARGE       Comments: List all goals to be met before discharge home:   - Serial troponins and stress test complete- Not met   - Seen and cleared by consultant if applicable- Not met    - Adequate pain control on oral analgesia- Partially met   - Vital signs normal or at patient baseline- Met    - Safe disposition plan has been identified- Not met

## 2023-03-22 NOTE — PROGRESS NOTES
Observation goals  PRIOR TO DISCHARGE        Comments: List all goals to be met before discharge home:   - Serial troponins and stress test complete.   Met  - Seen and cleared by consultant if applicable   Not Met  - Adequate pain control on oral analgesia   Met  - Vital signs normal or at patient baseline   Met  - Safe disposition plan has been identified   Met  - Nurse to notify provider when observation goals have been met and patient is ready for discharge.

## 2023-03-22 NOTE — DISCHARGE SUMMARY
[facility]  Hospitalist Discharge Summary     Admit Date:  3/21/2023  Discharge Date:     3/22/2023  Discharging Provider: James Gallego PA-C    PRIMARY CARE PROVIDER:    Hima Wynn     DISCHARGE DIAGNOSES:   Chest pain   History of NSTEMI secondary to CKD  Coronary artery disease  Fibromuscular dysplasia  MONROE (Improved)  Mild hyponatremia (Resolved)  Mild anemia  Hx of GI bleed    BRIEF HISTORY OF PRESENT ILLNESS:    Raúl Marie is a 67 year old male who was admitted on 3/21/2023.      Past medical history significant for CAD with recent NESTMI (2/2 CKD), HTN, HLP, IgA nephropathy with CKD, Fibromuscular dysplasia, BPH, History of GI bleed who was registered to short-stay/observation due to chest pain.       Notably, patient was admitted to the hospital on 1/4/2023 with chest pain and elevated troponin.  He underwent coronary angiography which revealed spontaneous coronary artery dissection with underlying coronary artery disease involving the diagonal, obtuse marginal, RPAV/RPL branches.  He was treated with aspirin and Plavix for 3 months, and was started on Imdur.      Patient has had some on-going mild chest pain since that time, but presented to the ED with significantly worsening pain after running on the treadmill 5 days prior to admission. He was hemodynamically stable in the ED.  Initial troponin was 18 and on repeat was stable at 17. EKG showed sinus rhythm with first degree AV block and no acute ST segment elevation. Cardiology was contacted in the ED and recommended admission for serial trop and chest pain rule out.     HOSPITAL COURSE:   Chest pain   History of NSTEMI secondary to CKD  Coronary artery disease  Fibromuscular dysplasia   *ECHO:  EF of 60-65%, trace aortic regurg and ascending aorta is borderline dilated.    *D-dimer checked and negative on day of discharge.    - Formal cardiology consult requested:               --Discussed with Dr. Lloyd of  Cardiology on day of discharge.    - Monitored on telemetry.  - Serial troponin:               --18-->17-->18.  - Continued on PTA ASA 81 mg/d, atorvastatin 40 mg/d, plavix 75 mg/d, metoprolol 12.5 mg BID.    - Pain control as needed.  - Imdur was increased to 60 mg/d and script was sent to patient's pharmacy.      MONROE (Improved)  Mild hyponatremia (Resolved)  Creatinine is elevated to 1.33. Baseline creatinine appears to be around 1.0-1.1. Sodium is 133.   - Continue IV fluids with NS at 100 ml/hr.    - BMP from morning of discharge with a creatinine of 1.16 and sodium of 138.       Mild anemia  Hx of GI bleed   Hemoglobin is 12.3 on admission. Stable from prior.   - Monitor PRN.    - Resumed on PTA ASA.        Clinically Significant Risk Factors Present on Admission                # Drug Induced Platelet Defect: home medication list includes an antiplatelet medication                    The patient was discussed with Dr. Ball who agrees with discharge at this time.       TOTAL DISCHARGE TIME:  IJames PA-C, personally saw the patient today and spent greater than 30 minutes discharging this patient.    James Gallego PA-C  [facility]  Securely message with the Vocera Web Console (learn more here)  Text page via [facility] Paging/Directory      DISCHARGE MEDICATIONS:       Review of your medicines      CONTINUE these medicines which may have CHANGED, or have new prescriptions. If we are uncertain of the size of tablets/capsules you have at home, strength may be listed as something that might have changed.      Dose / Directions   isosorbide mononitrate 60 MG 24 hr tablet  Commonly known as: IMDUR  This may have changed:     medication strength    how much to take  Used for: Chest pain, unspecified type, Fibromuscular dysplasia of carotid artery (H)      Dose: 60 mg  Take 1 tablet (60 mg) by mouth daily  Quantity: 30 tablet  Refills: 0        CONTINUE these  medicines which have NOT CHANGED      Dose / Directions   aspirin 81 MG EC tablet  Commonly known as: ASA  Used for: Spontaneous dissection of coronary artery      Dose: 81 mg  Take 1 tablet (81 mg) by mouth daily  Quantity: 100 tablet  Refills: 0     atorvastatin 40 MG tablet  Commonly known as: LIPITOR  Used for: Non-STEMI (non-ST elevated myocardial infarction) (H)      Dose: 40 mg  Take 1 tablet (40 mg) by mouth daily  Quantity: 90 tablet  Refills: 3     clopidogrel 75 MG tablet  Commonly known as: PLAVIX  Used for: Non-STEMI (non-ST elevated myocardial infarction) (H)      Dose: 75 mg  Take 1 tablet (75 mg) by mouth daily  Quantity: 30 tablet  Refills: 1     latanoprostene bunod 0.024 % Soln ophthalmic solution  Commonly known as: VYZULTA      Dose: 1 drop  Place 1 drop into both eyes At Bedtime  Refills: 0     metoprolol tartrate 25 MG tablet  Commonly known as: LOPRESSOR  Used for: Non-STEMI (non-ST elevated myocardial infarction) (H)      Dose: 12.5 mg  Take 0.5 tablets (12.5 mg) by mouth 2 times daily  Quantity: 60 tablet  Refills: 3     nitroGLYcerin 0.4 MG sublingual tablet  Commonly known as: NITROSTAT  Used for: Non-STEMI (non-ST elevated myocardial infarction) (H)      For chest pain place 1 tablet under the tongue every 5 minutes for 3 doses. If symptoms persist 5 minutes after 1st dose call 911.  Quantity: 15 tablet  Refills: 0     psyllium Packet  Commonly known as: METAMUCIL/KONSYL  Used for: Anemia, unspecified type, Rectal bleeding      Dose: 1 packet  Take 1 packet by mouth daily  Quantity: 30 packet  Refills: 0     tamsulosin 0.4 MG capsule  Commonly known as: FLOMAX      Dose: 0.4 mg  Take 0.4 mg by mouth daily  Refills: 0           Where to get your medicines      These medications were sent to [id number] - [place], [place] - [facility] [address] [address]    Phone: [phone]     isosorbide mononitrate 60 MG 24 hr tablet        ALLERGIES:    Allergies    Allergen Reactions     Chlorpheniramine-Phenylephrine Itching and Other (See Comments)     Other reaction(s): Other (See Comments)  Other reaction(s): Runny Nose     Ferrous Sulfate      Other reaction(s): constipation     Nuts      Other reaction(s): Unknown     Wheat Extract        DISPOSITION:    Discharged to home  Condition at discharge: Stable        Reason for your hospital stay    You were at [facility] due to chest pain.  You were seen by Cardiology (heart specialist) and had your Imdur dose increased from 30 mg a day ot 60 mg a day.  You also underwent an ECHO which results were reviewed with you.     Follow-up and recommended labs and tests     Follow up with primary care provider, Hima Wynn, within 7 days for hospital follow- up.  No follow up labs or test are needed.     Activity    Your activity upon discharge: activity as tolerated     Diet    Follow this diet upon discharge: Orders Placed This Encounter      Cardiac diet (2 gram daily salt limit and low fat)        Consultations This Hospital Stay   CARDIOLOGY IP CONSULT     LABORATORY IMAGING AND PROCEDURES:   Laboratory studies that included CBC with platelets differential, BMP x2, Serial Troponin T, D-Dimer.  Imaging studies that included EKG, Complete ECHO.       PENDING RESULTS:    None     PHYSICAL EXAMINATION ON DAY OF DISCHARGE:    Please review progress note as written earlier today.

## 2023-03-22 NOTE — PROGRESS NOTES
Echocardiogram personally reviewed.  It demonstrates normal left ventricular systolic function with no wall motion abnormalities.  I went over the echocardiographic findings with him in detail.  He is currently chest pain-free.  We will recommend discharge today with close outpatient follow-up.  I have encouraged him to take the rest of the week off work and to resume in a lighter capacity starting Monday.

## 2023-03-22 NOTE — PLAN OF CARE
"Goal Outcome Evaluation:  Orientation/Cognitive: A&O X4  Observation Goals (Met/ Not Met): Not met   Mobility Level/Assist Equipment: IND/SBA  Fall Risk (Y/N): Yes  Behavior Concerns: None   Pain Management: Denies pain meds   Tele/VS/O2: Tele- SR with 1st degree AV block. VSS on RA   ABNL Lab/BG: Creatinine-1.33  Diet: Regular with No Caffeine  Bowel/Bladder: Continent   Skin Concerns: None   Drains/Devices: IVF NS @100 ml/hr  Tests/Procedures for next shift: Cardiology consult   Anticipated DC date & active delays: TBD  Patient Stated Goal for Today: \"Rest\"      Observation goals  PRIOR TO DISCHARGE       Comments: List all goals to be met before discharge home:   - Serial troponins and stress test complete- Not met   - Seen and cleared by consultant if applicable- Not met    - Adequate pain control on oral analgesia- Partially met   - Vital signs normal or at patient baseline- Met    - Safe disposition plan has been identified- Not met                        "

## 2023-03-22 NOTE — PLAN OF CARE
Goal Outcome Evaluation:  A&OX4, All discharge meds and instructions reviewed with pt, pt verbalized understanding. Stable at time of discharge.

## 2023-03-22 NOTE — PROVIDER NOTIFICATION
MD Notification    Notified Person: MD    Notified Person Name: Dr. La    Notification Date/Time: 3/21/23 2002    Notification Interaction: Patient just got up to the floor and needs transfer orders if you are able to put thos in please, thank you. *[phone]    Purpose of Notification:    Orders Received: Placing orders now    Comments:

## 2023-03-22 NOTE — PROGRESS NOTES
"Observation goals  PRIOR TO DISCHARGE        Comments: List all goals to be met before discharge home:   - Serial troponins and stress test complete.   Met  - Seen and cleared by consultant if applicable   Met  - Adequate pain control on oral analgesia   Met  - Vital signs normal or at patient baseline   Met  - Safe disposition plan has been identified   Met  - Nurse to notify provider when observation goals have been met and patient is ready for discharge.         Goal Outcome Evaluation:  Orientation/Cognitive: A&O X4  Observation Goals (Met/ Not Met): met   Mobility Level/Assist Equipment: IND/SBA  Fall Risk (Y/N): Yes  Behavior Concerns: None   Pain Management: PRN Tylenol   Tele/VS/O2: Tele- SB with 1st degree AV block. VSS on RA    ABNL Lab/BG: Troponin T 18.   Diet: Regular with No Caffeine  Bowel/Bladder: Continent   Skin Concerns: None   Drains/Devices: None  Tests/Procedures for next shift: None  Anticipated DC date & active delays: Today  Patient Stated Goal for Today: \"Rest\"    "

## 2023-03-24 ENCOUNTER — PATIENT OUTREACH (OUTPATIENT)
Dept: CARE COORDINATION | Facility: CLINIC | Age: 68
End: 2023-03-24
Payer: MEDICARE

## 2023-03-24 ENCOUNTER — NURSE TRIAGE (OUTPATIENT)
Dept: NURSING | Facility: CLINIC | Age: 68
End: 2023-03-24
Payer: MEDICARE

## 2023-03-24 NOTE — PROGRESS NOTES
[facility] Contact  [facility]/Voicemail     Clinical Data: Transitional Care Management Outreach     Outreach attempted x 2.  Left message on patient's voicemail, providing [facility]'s 24/7 scheduling and nurse triage phone number [phone] ([phone]) for questions/concerns and/or to schedule an appt with an [facility] provider, if they do not have a PCP.      Plan:  [facility] will do no further outreaches at this time.       Courtney Cárdenas  Community Health Worker  [facility], [facility]  [phone]      *Connected Care Resource Team does NOT follow patient ongoing. Referrals are identified based on internal discharge reports and the outreach is to ensure patient has an understanding of their discharge instructions.

## 2023-03-24 NOTE — TELEPHONE ENCOUNTER
"Patient calling to ask if he needs to see his PCP.    He reports having a heart attack at the beginning of the year and was in the ER two days ago with symptoms and admitted for observation.  \"They found nothing wrong with my heart or blood vessels.\"    The AVS recommended he should make follow up appointment with PCP in 7 days, but he has a follow up with his heart provider and not sure he needs it.    Recommendation for PCP follow up usually to discuss the symptoms he had that brought him to the ER.  Patient verbalizes understanding and will contact his PCP.    Mecca Murillo RN  [facility]                    "

## 2023-03-27 ENCOUNTER — HOSPITAL ENCOUNTER (OUTPATIENT)
Dept: CARDIAC REHAB | Facility: CLINIC | Age: 68
Discharge: HOME | End: 2023-03-27
Attending: STUDENT IN AN ORGANIZED HEALTH CARE EDUCATION/TRAINING PROGRAM
Payer: MEDICARE

## 2023-03-27 PROCEDURE — 93798 PHYS/QHP OP CAR RHAB W/ECG: CPT | Performed by: CLINICAL EXERCISE PHYSIOLOGIST

## 2023-03-30 ENCOUNTER — TRANSFERRED RECORDS (OUTPATIENT)
Dept: HEALTH INFORMATION MANAGEMENT | Facility: CLINIC | Age: 68
End: 2023-03-30
Payer: MEDICARE

## 2023-03-31 ENCOUNTER — TELEPHONE (OUTPATIENT)
Dept: CARDIOLOGY | Facility: CLINIC | Age: 68
End: 2023-03-31
Payer: MEDICARE

## 2023-03-31 NOTE — TELEPHONE ENCOUNTER
"Patient states two weeks ago her \"overexerted himself for a short time.\"  Patient states since that time he has had chest pains occurring intermittently.  Was recently hospitalized overnight due to pain.  Patient feels that pains often occur after exerting self.  Patient currently rates CP 2/10.  Denies SOB.  Patient has upcoming OV 4/3/23 with Dr. Muniz.  Will route to provider for further recommendations.  Lillie Monroy RN on 3/31/2023 at 1:53 PM    "

## 2023-03-31 NOTE — TELEPHONE ENCOUNTER
[facility] Call Center    Phone Message    May a detailed message be left on voicemail: yes     Reason for Call: Other: Pt called in stating he is having some very small slight chest kate and would like for Dedra to give him a call at [phone].     Action Taken: Other: Cardiology    Travel Screening: Not Applicable     Thank you!  [facility]

## 2023-03-31 NOTE — TELEPHONE ENCOUNTER
I recommend he take it easy for the next few days leading up to his visit with Dr. Muniz.       He should continue to take Imdur 30 mg as previously ordered for him.  If his chest pain remains mild-moderate, he can try taking sublingual nitroglycerin (in addition to the scheduled Imdur he takes daily) to see if this helps relieve mild chest pain symptoms.  For severe or progressive pain unrelieved with rest or the above therapies, he should consider seeking care at the emergency department.     Dedra     Called patient and discussed above.  Patient verbalized understanding.  Lillie Monroy RN on 3/31/2023 at 2:44 PM

## 2023-04-03 ENCOUNTER — OFFICE VISIT (OUTPATIENT)
Dept: CARDIOLOGY | Facility: CLINIC | Age: 68
End: 2023-04-03
Attending: NURSE PRACTITIONER
Payer: MEDICARE

## 2023-04-03 VITALS
BODY MASS INDEX: 23.93 KG/M2 | DIASTOLIC BLOOD PRESSURE: 70 MMHG | OXYGEN SATURATION: 96 % | WEIGHT: 157.9 LBS | HEART RATE: 69 BPM | SYSTOLIC BLOOD PRESSURE: 123 MMHG | HEIGHT: 68 IN

## 2023-04-03 DIAGNOSIS — R07.9 NONSPECIFIC CHEST PAIN: ICD-10-CM

## 2023-04-03 DIAGNOSIS — E78.5 HYPERLIPIDEMIA LDL GOAL <70: ICD-10-CM

## 2023-04-03 DIAGNOSIS — I21.4 NON-STEMI (NON-ST ELEVATED MYOCARDIAL INFARCTION) (H): ICD-10-CM

## 2023-04-03 DIAGNOSIS — I77.3 FIBROMUSCULAR DYSPLASIA OF CAROTID ARTERY (H): ICD-10-CM

## 2023-04-03 DIAGNOSIS — I25.42 SPONTANEOUS DISSECTION OF CORONARY ARTERY: Primary | ICD-10-CM

## 2023-04-03 PROCEDURE — 99215 OFFICE O/P EST HI 40 MIN: CPT | Performed by: INTERNAL MEDICINE

## 2023-04-03 NOTE — LETTER
4/3/2023    [facility] [address]  [place] [address]    RE: Raúl Marie       Dear Colleague,     I [facility] the [facility].  HPI and Plan:   See dictation    Today's clinic visit entailed:  Review of the result(s) of each unique test - echo, ecg, troponin levels, bmp  The following tests were independently interpreted by me as noted in my documentation: ECG  Ordering of each unique test  Prescription drug management  40 minutes spent by me on the date of the encounter doing chart review, review of test results, interpretation of tests, patient visit and documentation   Provider  Link to MDM Help Grid     The level of medical decision making during this visit was of high complexity.      Orders Placed This Encounter   Procedures    Basic metabolic panel    Lipid Profile    ALT    Basic metabolic panel    Follow-Up with Cardiology FLO    Follow-Up with Cardiology    Echocardiogram Complete       No orders of the defined types were placed in this encounter.      There are no discontinued medications.      Encounter Diagnoses   Name Primary?    Non-STEMI (non-ST elevated myocardial infarction) (H)     Fibromuscular dysplasia of carotid artery (H)     Spontaneous dissection of coronary artery Yes    Nonspecific chest pain     Hyperlipidemia LDL goal <70        CURRENT MEDICATIONS:  Current Outpatient Medications   Medication Sig Dispense Refill    aspirin (ASA) 81 MG EC tablet Take 1 tablet (81 mg) by mouth daily 100 tablet 0    atorvastatin (LIPITOR) 40 MG tablet Take 1 tablet (40 mg) by mouth daily 90 tablet 3    clopidogrel (PLAVIX) 75 MG tablet Take 1 tablet (75 mg) by mouth daily 30 tablet 1    isosorbide mononitrate (IMDUR) 60 MG 24 hr tablet Take 1 tablet (60 mg) by mouth daily 30 tablet 0    latanoprostene bunod (VYZULTA) 0.024 % SOLN ophthalmic solution Place 1 drop into both eyes At Bedtime      metoprolol  tartrate (LOPRESSOR) 25 MG tablet Take 0.5 tablets (12.5 mg) by mouth 2 times daily 60 tablet 3    nitroGLYcerin (NITROSTAT) 0.4 MG sublingual tablet For chest pain place 1 tablet under the tongue every 5 minutes for 3 doses. If symptoms persist 5 minutes after 1st dose call 911. 15 tablet 0    psyllium (METAMUCIL/KONSYL) Packet Take 1 packet by mouth daily 30 packet 0    tamsulosin (FLOMAX) 0.4 MG capsule Take 0.4 mg by mouth daily         ALLERGIES     Allergies   Allergen Reactions    Chlorpheniramine-Phenylephrine Itching and Other (See Comments)     Other reaction(s): Other (See Comments)  Other reaction(s): Runny Nose    Ferrous Sulfate      Other reaction(s): constipation    Nuts      Other reaction(s): Unknown    Wheat Extract        PAST MEDICAL HISTORY:  Past Medical History:   Diagnosis Date    Blood in semen     BPH (benign prostatic hyperplasia)     Glaucoma     Hemorrhoids, unspecified hemorrhoid type     Hernia, abdominal     IgA nephropathy     Mumps        PAST SURGICAL HISTORY:  Past Surgical History:   Procedure Laterality Date    APPENDECTOMY      COLONOSCOPY N/A 10/07/2022    Procedure: COLONOSCOPY, WITH POLYPECTOMY AND BIOPSY;  Surgeon: Sylvie Jj MD;  Location:  GI    CV CORONARY ANGIOGRAM N/A 01/04/2023    Procedure: Coronary Angiogram;  Surgeon: Jose Elias Wahl MD;  Location:  HEART CARDIAC CATH LAB    ESOPHAGOSCOPY, GASTROSCOPY, DUODENOSCOPY (EGD), COMBINED N/A 10/07/2022    Procedure: ESOPHAGOGASTRODUODENOSCOPY, WITH BIOPSY;  Surgeon: Sylvie Jj MD;  Location:  GI       FAMILY HISTORY:  Family History   Problem Relation Age of Onset    Diabetes Father     Colon Cancer Paternal Grandmother        SOCIAL HISTORY:  Social History     Socioeconomic History    Marital status: Single     Spouse name: None    Number of children: None    Years of education: None    Highest education level: None   Tobacco Use    Smoking status: Never    Smokeless tobacco: Never   Substance  "and Sexual Activity    Alcohol use: Yes     Comment: occasional    Drug use: Not Currently     Types: Marijuana       Review of Systems:  Skin:  Positive for bruising     Eyes:  Positive for glasses    ENT:  Negative      Respiratory:  Negative       Cardiovascular:  Negative;lightheadedness;dizziness;edema Positive for;chest pain;palpitations;fatigue palpitations: occasional, chest pain: constant, fluctuates in intensity, mild pain  Gastroenterology: Negative      Genitourinary:  Negative      Musculoskeletal:  Positive for back pain back pain: chronic  Neurologic:  Negative      Psychiatric:  Negative      Heme/Lymph/Imm:  Positive for allergies    Endocrine:  Negative        Physical Exam:  Vitals: /70 (BP Location: Left arm, Patient Position: Sitting)   Pulse 69   Ht 1.727 m (5' 8\")   Wt 71.6 kg (157 lb 14.4 oz)   SpO2 96%   BMI 24.01 kg/m      Constitutional:           Skin:             Head:           Eyes:           Lymph:      ENT:           Neck:           Respiratory:            Cardiac:                                                           GI:           Extremities and Muscular Skeletal:                 Neurological:           Psych:               Thank you for allowing me to participate in the care of your patient.    Sincerely,     SUMANTH MONTENEGRO MD     [facility]      "

## 2023-04-03 NOTE — PATIENT INSTRUCTIONS
It was a pleasure seeing you today and thank you for allowing me to be a part of your health care team.  Should you have any questions regarding your visit or future needs please feel free to reach out to my care team for assistance.      Thank you, Dr. Justo Muniz        **Nursing: [phone]       **Scheduling: [phone]

## 2023-04-03 NOTE — PROGRESS NOTES
HPI and Plan:   See dictation    Today's clinic visit entailed:  Review of the result(s) of each unique test - echo, ecg, troponin levels, bmp  The following tests were independently interpreted by me as noted in my documentation: ECG  Ordering of each unique test  Prescription drug management  40 minutes spent by me on the date of the encounter doing chart review, review of test results, interpretation of tests, patient visit and documentation   Provider  Link to [facility] Help Grid     The level of medical decision making during this visit was of high complexity.      Orders Placed This Encounter   Procedures     Basic metabolic panel     Lipid Profile     ALT     Basic metabolic panel     Follow-Up with Cardiology FLO     Follow-Up with Cardiology     Echocardiogram Complete       No orders of the defined types were placed in this encounter.      There are no discontinued medications.      Encounter Diagnoses   Name Primary?     Non-STEMI (non-ST elevated myocardial infarction) (H)      Fibromuscular dysplasia of carotid artery (H)      Spontaneous dissection of coronary artery Yes     Nonspecific chest pain      Hyperlipidemia LDL goal <70        CURRENT MEDICATIONS:  Current Outpatient Medications   Medication Sig Dispense Refill     aspirin (ASA) 81 MG EC tablet Take 1 tablet (81 mg) by mouth daily 100 tablet 0     atorvastatin (LIPITOR) 40 MG tablet Take 1 tablet (40 mg) by mouth daily 90 tablet 3     clopidogrel (PLAVIX) 75 MG tablet Take 1 tablet (75 mg) by mouth daily 30 tablet 1     isosorbide mononitrate (IMDUR) 60 MG 24 hr tablet Take 1 tablet (60 mg) by mouth daily 30 tablet 0     latanoprostene bunod (VYZULTA) 0.024 % SOLN ophthalmic solution Place 1 drop into both eyes At Bedtime       metoprolol tartrate (LOPRESSOR) 25 MG tablet Take 0.5 tablets (12.5 mg) by mouth 2 times daily 60 tablet 3     nitroGLYcerin (NITROSTAT) 0.4 MG sublingual tablet For chest pain place 1 tablet under the tongue every 5  minutes for 3 doses. If symptoms persist 5 minutes after 1st dose call 911. 15 tablet 0     psyllium (METAMUCIL/KONSYL) Packet Take 1 packet by mouth daily 30 packet 0     tamsulosin (FLOMAX) 0.4 MG capsule Take 0.4 mg by mouth daily         ALLERGIES     Allergies   Allergen Reactions     Chlorpheniramine-Phenylephrine Itching and Other (See Comments)     Other reaction(s): Other (See Comments)  Other reaction(s): Runny Nose     Ferrous Sulfate      Other reaction(s): constipation     Nuts      Other reaction(s): Unknown     Wheat Extract        PAST MEDICAL HISTORY:  Past Medical History:   Diagnosis Date     Blood in semen      BPH (benign prostatic hyperplasia)      Glaucoma      Hemorrhoids, unspecified hemorrhoid type      Hernia, abdominal      IgA nephropathy      Mumps        PAST SURGICAL HISTORY:  Past Surgical History:   Procedure Laterality Date     APPENDECTOMY       COLONOSCOPY N/A 10/07/2022    Procedure: COLONOSCOPY, WITH POLYPECTOMY AND BIOPSY;  Surgeon: Sylvie Jj MD;  Location:  GI     CV CORONARY ANGIOGRAM N/A 01/04/2023    Procedure: Coronary Angiogram;  Surgeon: Jose Elias Wahl MD;  Location:  HEART CARDIAC CATH LAB     ESOPHAGOSCOPY, GASTROSCOPY, DUODENOSCOPY (EGD), COMBINED N/A 10/07/2022    Procedure: ESOPHAGOGASTRODUODENOSCOPY, WITH BIOPSY;  Surgeon: Sylvie Jj MD;  Location:  GI       FAMILY HISTORY:  Family History   Problem Relation Age of Onset     Diabetes Father      Colon Cancer Paternal Grandmother        SOCIAL HISTORY:  Social History     Socioeconomic History     Marital status: Single     Spouse name: None     Number of children: None     Years of education: None     Highest education level: None   Tobacco Use     Smoking status: Never     Smokeless tobacco: Never   Substance and Sexual Activity     Alcohol use: Yes     Comment: occasional     Drug use: Not Currently     Types: Marijuana       Review of Systems:  Skin:  Positive for bruising     Eyes:   "Positive for glasses    ENT:  Negative      Respiratory:  Negative       Cardiovascular:  Negative;lightheadedness;dizziness;edema Positive for;chest pain;palpitations;fatigue palpitations: occasional, chest pain: constant, fluctuates in intensity, mild pain  Gastroenterology: Negative      Genitourinary:  Negative      Musculoskeletal:  Positive for back pain back pain: chronic  Neurologic:  Negative      Psychiatric:  Negative      Heme/Lymph/Imm:  Positive for allergies    Endocrine:  Negative        Physical Exam:  Vitals: /70 (BP Location: Left arm, Patient Position: Sitting)   Pulse 69   Ht 1.727 m (5' 8\")   Wt 71.6 kg (157 lb 14.4 oz)   SpO2 96%   BMI 24.01 kg/m      Constitutional:           Skin:             Head:           Eyes:           Lymph:      ENT:           Neck:           Respiratory:            Cardiac:                                                           GI:           Extremities and Muscular Skeletal:                 Neurological:           Psych:           CC  ABIODUN Bennett CNP  [address]  [place] [address]              "

## 2023-04-03 NOTE — PROGRESS NOTES
Service Date: 04/03/2023    HISTORY OF PRESENT ILLNESS:  I had the opportunity to see Mr. 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  I met Mr. Marie when he was hospitalized at [facility] on 01/04/2023 with chest pain and elevated troponin levels.  His troponin was greater than 1000.  He had typical angina as well and I referred him for coronary angiography immediately upon evaluation.  This revealed evidence of multivessel coronary dissection involving the third diagonal and a distal obtuse marginal vessel.  There was also some disease within the distal right coronary artery and posterolateral branch.  His disease was in small distal branch arteries and therefore not amenable to revascularization.  He was started on medical therapy including aspirin, Plavix, and isosorbide mononitrate 30 mg daily.  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    He has continued to have episodic chest pain symptoms since then.  Most of these episodes are mild and fairly brief but quite persistent.  At one point, he had pain throughout the night and came in to the emergency room at [facility] on 03/21/2023 for evaluation.  Fortunately, his ECG did not show any acute ischemic changes and his echocardiogram was normal.  His ejection fraction was 60%-65% with no regional wall motion abnormalities.  His troponin levels were normal on 3 successive values.  He was seen by Cardiology during that stay and his isosorbide mononitrate was increased from 30 up to 60 mg a day.  He did not undergo any further ischemic evaluation.    He comes in today with a long list of questions and concerns about his intermittent chest pain episodes.  I did my best to answer  all of his questions.    PHYSICAL EXAMINATION:  On examination today his blood pressure was 123/70, heart rate 69 and weight 157 pounds.  His lungs are clear.  Heart rhythm is regular.  He has no cardiac murmurs or carotid bruits.    IMPRESSIONS:  Mr. Raúl Marie is a 67-year-old gentleman felt to have evidence of spontaneous coronary artery dissection when he presented with acute nontransmural infarction in 01/2023.  His disease was involving small distal branch arteries and he did not undergo revascularization.  We have been treating him medically.  Unfortunately, he has continued to have chest pain symptoms intermittently and sometimes persistently since then.  Fortunately, despite having a prolonged episode of chest discomfort for hours throughout the evening, his troponin levels were entirely normal when he came to the emergency room in late March.  His echocardiogram was normal as well.  He could certainly be having some angina, but I suspect much of his chest discomfort is not related to ischemic heart disease.  I would suggest continuing his isosorbide mononitrate 60 mg a day as well as his aspirin, Plavix and atorvastatin for now.  If he has a significant increase in duration and intensity of his chest discomfort, I recommended that he seek additional evaluation in the emergency room.  This is very difficult to evaluate based on symptoms alone.  I would not discouraged him from seeking further evaluation if he feels that the situation is worsening.    However, I also provided him some reassurance and we talked about the development of collateral coronary circulation to help relieve areas of myocardial ischemia.  I encouraged him to continue his exercise regularly to help build those collaterals, and hopefully he will eventually become chest pain free.    He needs a kidney biopsy or treatment for a nodule on one of his kidneys.  I suggested that we wait just a little longer before stopping his Plavix and  aspirin for that procedure.  I am hoping by  that we can be more confident that stopping his aspirin and Plavix for that procedure would be safe.    I will have him return to follow up with us again in 3-6 months.    Justo Muniz MD    cc:  Hima Wynn MD  [address], [address]  [place] [address]    Justo Muniz MD, [facility]        D: 2023   T: 2023   MT:     Name:     ELENA COWARTEric  MRN:      [id number]        Account:      [id number]   :      1955           Service Date: 2023       Document: [id number]

## 2023-04-08 ENCOUNTER — HEALTH MAINTENANCE LETTER (OUTPATIENT)
Age: 68
End: 2023-04-08

## 2023-04-10 ENCOUNTER — CARE COORDINATION (OUTPATIENT)
Dept: CARDIOLOGY | Facility: CLINIC | Age: 68
End: 2023-04-10
Payer: MEDICARE

## 2023-04-10 NOTE — PROGRESS NOTES
Pt called to report that his cardiac rehab will be ending soon and he has to decide whether he wants to continue it. Pt also wanted to know what  would expect as the optimal level of exercise for pt in regards to his cardiac hx. I asked pt if he has completed 36 sessions of cardiac rehab, as that is typically what insurance will cover. Pt said he thinks he has completed around 12 sessions. He would like to continue with rehab if possible. I will call cardiac rehab in the AM to clarify if pt's rehab is ending or whether he needs an updated order.     I asked pt what level of exercise he did prior to his SCAD diagnosis and pt said he used to walk around one hour several times a week and lifted dumbbells. Pt wants to know if he should ramp up his exercise routine and try doing more vigorous work outs such as running. I will clarify with  what the exercise expectations are for him, but that he likely will want him to do activity as tolerated, and not overdue it. Karli PERKINS April 10, 2023, 4:35 PM

## 2023-04-11 NOTE — PROGRESS NOTES
I left  for [facility] cardiac rehab to clarify if pt needs another rehab order to be placed at this time. Karli PERKINS April 11, 2023, 3:19 PM

## 2023-04-13 NOTE — TELEPHONE ENCOUNTER
I would recommend that he continue to exercise with cardiac rehab for as long as he has insurance coverage and let them help him progress his exercise level appropriately.  Basically, I would not recommend any strenuous activities such as heavy weightlifting or running.  Light weights, walking, elliptical machine and so forth seem appropriate at this time with slow and gradual increase in exercise level. EE

## 2023-04-14 NOTE — PROGRESS NOTES
I received call back from cardiac rehab. Pt has been to 12 sessions of cardiac rehab. He has not been going on a weekly basis and medicare guidelines say that pt should be attending at least one day a week to maintain coverage of cardiac rehab. Pt has no showed or canceled multiple rehab sessions. They need pt to commit to going at least one day week or he is outside of medicare compliance.      I called pt and reviewed the update from cardiac rehab and 's exercise goals for him. I told pt that  wants him to focus on low impact activity for exercise such as walking, elliptical, or swimming. Pt is going to call cardiac rehab to discuss how often he needs to be going. Karli PERKINS April 14, 2023, 10:26 AM

## 2023-04-24 ENCOUNTER — HOSPITAL ENCOUNTER (OUTPATIENT)
Dept: CARDIAC REHAB | Facility: CLINIC | Age: 68
Discharge: HOME | End: 2023-04-24
Attending: STUDENT IN AN ORGANIZED HEALTH CARE EDUCATION/TRAINING PROGRAM
Payer: MEDICARE

## 2023-04-24 PROCEDURE — 93798 PHYS/QHP OP CAR RHAB W/ECG: CPT

## 2023-05-01 ENCOUNTER — OFFICE VISIT (OUTPATIENT)
Dept: URGENT CARE | Facility: URGENT CARE | Age: 68
End: 2023-05-01
Payer: MEDICARE

## 2023-05-01 ENCOUNTER — LAB (OUTPATIENT)
Dept: LAB | Facility: CLINIC | Age: 68
End: 2023-05-01
Payer: MEDICARE

## 2023-05-01 VITALS
HEART RATE: 80 BPM | BODY MASS INDEX: 23.72 KG/M2 | SYSTOLIC BLOOD PRESSURE: 110 MMHG | WEIGHT: 156 LBS | DIASTOLIC BLOOD PRESSURE: 69 MMHG | TEMPERATURE: 96.9 F | OXYGEN SATURATION: 97 %

## 2023-05-01 DIAGNOSIS — R22.32 MASS OF LEFT FOREARM: Primary | ICD-10-CM

## 2023-05-01 DIAGNOSIS — N28.89 LEFT RENAL MASS: ICD-10-CM

## 2023-05-01 LAB
ANION GAP SERPL CALCULATED.3IONS-SCNC: 10 MMOL/L (ref 7–15)
BUN SERPL-MCNC: 20.9 MG/DL (ref 8–23)
CALCIUM SERPL-MCNC: 9.5 MG/DL (ref 8.8–10.2)
CHLORIDE SERPL-SCNC: 102 MMOL/L (ref 98–107)
CREAT SERPL-MCNC: 1.18 MG/DL (ref 0.67–1.17)
DEPRECATED HCO3 PLAS-SCNC: 26 MMOL/L (ref 22–29)
ERYTHROCYTE [DISTWIDTH] IN BLOOD BY AUTOMATED COUNT: 14 % (ref 10–15)
GFR SERPL CREATININE-BSD FRML MDRD: 68 ML/MIN/1.73M2
GLUCOSE SERPL-MCNC: 93 MG/DL (ref 70–99)
HCT VFR BLD AUTO: 41.6 % (ref 40–53)
HGB BLD-MCNC: 14 G/DL (ref 13.3–17.7)
MCH RBC QN AUTO: 31.1 PG (ref 26.5–33)
MCHC RBC AUTO-ENTMCNC: 33.7 G/DL (ref 31.5–36.5)
MCV RBC AUTO: 92 FL (ref 78–100)
PLATELET # BLD AUTO: 171 10E3/UL (ref 150–450)
POTASSIUM SERPL-SCNC: 3.8 MMOL/L (ref 3.4–5.3)
RBC # BLD AUTO: 4.5 10E6/UL (ref 4.4–5.9)
SODIUM SERPL-SCNC: 138 MMOL/L (ref 136–145)
WBC # BLD AUTO: 7.1 10E3/UL (ref 4–11)

## 2023-05-01 PROCEDURE — 80048 BASIC METABOLIC PNL TOTAL CA: CPT

## 2023-05-01 PROCEDURE — 85027 COMPLETE CBC AUTOMATED: CPT

## 2023-05-01 PROCEDURE — 99203 OFFICE O/P NEW LOW 30 MIN: CPT

## 2023-05-01 PROCEDURE — 36415 COLL VENOUS BLD VENIPUNCTURE: CPT

## 2023-05-01 ASSESSMENT — ENCOUNTER SYMPTOMS
CARDIOVASCULAR NEGATIVE: 1
RESPIRATORY NEGATIVE: 1
NEUROLOGICAL NEGATIVE: 1
CONSTITUTIONAL NEGATIVE: 1

## 2023-05-01 NOTE — PROGRESS NOTES
Assessment & Plan     Mass of left forearm  Mass appears consistent with a localized hematoma.  There is mild tenderness in the affected area but no erythema to suggest infection.  The patient does not meet PERC criteria for pulmonary embolism except for age.  He is on Plavix, low concern for PE or DVT today.    However given the patient's concern for mass in the left forearm, I am providing referral to [facility] for further evaluation and management.    Recommend continuing to monitor and applying warm packs to the affected area, which will help the hematoma resolved.    Seek urgent medical evaluation if you develop numbness or tingling or weakness in the left forearm, worsening pain or swelling, chest pain or shortness of breath, palpitations or strange heartbeats, acute vision changes or headaches.                   No follow-ups on file.    [facility] Urgent Care Provider  [facility] CHRIS Olsen is a 67 year old, presenting for the following health issues:  Urgent Care and Derm Problem (Pt has a lump on left forearm from today. Pt did have some blood work today from right arm.)         View : No data to display.              HPI   The patient is a 67-year-old male with a past medical history of NSTEMI, renal mass,, coronary artery dissection, anemia, rectal bleeding who presents for evaluation of a mass on the left forearm which was noted earlier today.  The affected area is mildly tender but no redness present.  He denies trauma.  He is taking Plavix, started recently after his NSTEMI.  He presented to clinic earlier today for an MRI for further evaluation of a kidney concern (renal mass) but did not complete the evaluation due to concern for the mass on his left forearm.  He is unsure when the mass first appeared but thinks it was today or yesterday.  No fevers, no pain in the forearm, wrist or elbow.  No numbness or tingling or weakness in the left upper extremity.  No chest discomfort  or shortness of breath palpitations.            Review of Systems   Constitutional: Negative.    Respiratory: Negative.    Cardiovascular: Negative.    Skin: Negative.    Neurological: Negative.             Objective    /69   Pulse 80   Temp 96.9  F (36.1  C) (Tympanic)   Wt 70.8 kg (156 lb)   SpO2 97%   BMI 23.72 kg/m    Body mass index is 23.72 kg/m .  Physical Exam  Constitutional:       General: He is not in acute distress.     Appearance: Normal appearance. He is not ill-appearing, toxic-appearing or diaphoretic.   Cardiovascular:      Pulses: Normal pulses.           Radial pulses are 2+ on the right side and 2+ on the left side.   Musculoskeletal:         General: No swelling or tenderness.      Cervical back: No muscular tenderness.   Skin:     General: Skin is warm and dry.      Findings: Lesion present. No erythema, laceration or rash.             Comments: Well-defined lesion on the volar aspect of the left forearm at the location shown in the diagram above.  Lesion appears mildly ecchymotic.  No erythema.  Strength is 5/5 in the left elbow, wrist and forearm.  Normal sensation to light touch in the left upper extremity.   Neurological:      Mental Status: He is alert and oriented to person, place, and time.      Sensory: No sensory deficit.

## 2023-05-02 ENCOUNTER — TELEPHONE (OUTPATIENT)
Dept: ONCOLOGY | Facility: CLINIC | Age: 68
End: 2023-05-02
Payer: MEDICARE

## 2023-05-02 NOTE — TELEPHONE ENCOUNTER
[facility] Call Center    Phone Message    May a detailed message be left on voicemail: yes     Reason for Call: Other: pt calling MRI tomorrow, dye in him, has had bad experience and wants to talk to somebody about this, please advise     Action Taken: Other: urology    Travel Screening: Not Applicable

## 2023-05-02 NOTE — PROGRESS NOTES
Pt called back and left VM stating he would like to discuss benefits of cardiac rehab more. Doesn't think he wants to continue if Dr. Muniz recommends lower levels of exertion as he thinks he can do that at home on his own.     Call back out to pt. Reviewed again Dr. Muniz's recommendations below, as well as the fact he had an ED visit last month for chest pain and the recommendation from Cardiology at that time was to avoid intense exercise and allow his SCAD to heal. Discussed benefits of cardiac rehab including that it would help him gradually progress his level of activity in a safe manner and under trained staff supervision.     Pt discussed his other concerns over insurance coverage and said he called CMS services and said they didn't necessarily have a requirement of a minimum of 1 x per week but apparently that requirement may be facility-specific based on contracted service between the rehab center and CMS. Pt said he has concerns about owing $300/session if he were to miss some weekly sessions and lost coverage. Pt said he's not prepared to continue with cardiac rehab at this time with inability to guarantee he can make weekly appts and the associated level of uncertainty with insurance coverage. Pt states he understands the benefit of cardiac rehab but he's going to work out at home on his own for now. Reiterated light exercise per Dr. Muniz's recommendations below. Advised if he experiences any chest symptoms while exercising he should stop and rest. Reviewed concerning signs/symptoms and to seek ED if symptoms progress or don't go away despite rest. Pt verbalized understanding. Martha Sun RN on 5/2/2023 at 11:26 AM      April 13, 2023  Justo Muniz MD  to [facility] Heart Team 7        4/13/23  5:31 PM  Note     I would recommend that he continue to exercise with cardiac rehab for as long as he has insurance coverage and let them help him progress his exercise level appropriately.  Basically,  I would not recommend any strenuous activities such as heavy weightlifting or running.  Light weights, walking, elliptical machine and so forth seem appropriate at this time with slow and gradual increase in exercise level. EE

## 2023-05-02 NOTE — PATIENT INSTRUCTIONS
Seek urgent medical evaluation if you develop numbness or tingling or weakness in the left forearm, worsening pain or swelling, chest pain or shortness of breath, palpitations or strange heartbeats, acute vision changes or headaches.

## 2023-05-02 NOTE — TELEPHONE ENCOUNTER
"Sent to MD- \"Patient states that one time he had an iron infusion that lead to a heart attack. He is nervous about using dye for the MRI. He said if it's necessary he will proceed. He just is a little nervous given past history after Iron Infusion. \"  Per MD- \"I understand his nervousness given his personal prior experience. That said, it is a small volume of fluid given for the MRI and the contrast is very safe. In this context, the contrast is necessary to give us optimal imaging of his renal mass.     Please provide reassurance and let me know if he has additional questions or concerns. \"    Called patient and informed of MD's recommendation and notes above. Patient will proceed with MRI with contrast as planned.     Mercy Kerr LPN    "

## 2023-05-03 ENCOUNTER — ANCILLARY PROCEDURE (OUTPATIENT)
Dept: MRI IMAGING | Facility: CLINIC | Age: 68
End: 2023-05-03
Attending: UROLOGY
Payer: MEDICARE

## 2023-05-03 PROCEDURE — 74183 MRI ABD W/O CNTR FLWD CNTR: CPT | Mod: MG | Performed by: RADIOLOGY

## 2023-05-03 PROCEDURE — A9585 GADOBUTROL INJECTION: HCPCS | Performed by: RADIOLOGY

## 2023-05-03 PROCEDURE — G1010 CDSM STANSON: HCPCS | Performed by: RADIOLOGY

## 2023-05-03 RX ORDER — GADOBUTROL 604.72 MG/ML
7.5 INJECTION INTRAVENOUS ONCE
Status: COMPLETED | OUTPATIENT
Start: 2023-05-03 | End: 2023-05-03

## 2023-05-03 RX ADMIN — GADOBUTROL 7 ML: 604.72 INJECTION INTRAVENOUS at 14:13

## 2023-05-03 NOTE — DISCHARGE INSTRUCTIONS
MRI Contrast Discharge Instructions    The IV contrast you received today will pass out of your body in your  urine. This will happen in the next 24 hours. You will not feel this process.  Your urine will not change color.    Drink at least 4 extra glasses of water or juice today (unless your doctor  has restricted your fluids). This reduces the stress on your kidneys.  You may take your regular medicines.    If you are on dialysis: It is best to have dialysis today.    If you have a reaction: Most reactions happen right away. If you have  any new symptoms after leaving the hospital (such as hives or swelling),  call your hospital at the correct number below. Or call your family doctor.  If you have breathing distress or wheezing, call 911.    Special instructions: ***    I have read and understand the above information.    Signature:______________________________________ Date:___________    Staff:__________________________________________ Date:___________     Time:__________    [place] [facility] Departments:    ___[place]: [phone]  ___[facility]: [phone]  ___[place]: [phone] ___[facility]: [phone]  ___[facility]: [phone]  ___[facility]: [phone]  ___[phone]  ___[facility]: [phone]  ___[phone]

## 2023-05-04 ENCOUNTER — OFFICE VISIT (OUTPATIENT)
Dept: UROLOGY | Facility: CLINIC | Age: 68
End: 2023-05-04
Payer: MEDICARE

## 2023-05-04 VITALS
DIASTOLIC BLOOD PRESSURE: 76 MMHG | WEIGHT: 155 LBS | SYSTOLIC BLOOD PRESSURE: 112 MMHG | HEIGHT: 68 IN | BODY MASS INDEX: 23.49 KG/M2 | HEART RATE: 71 BPM | OXYGEN SATURATION: 94 %

## 2023-05-04 DIAGNOSIS — N28.89 LEFT RENAL MASS: Primary | ICD-10-CM

## 2023-05-04 PROCEDURE — 99213 OFFICE O/P EST LOW 20 MIN: CPT | Performed by: UROLOGY

## 2023-05-04 ASSESSMENT — PAIN SCALES - GENERAL: PAINLEVEL: NO PAIN (0)

## 2023-05-04 NOTE — Clinical Note
5/4/2023       RE: Raúl Marie  [address]  [address]  [facility] [address]     Dear Colleague,    Thank you for referring your patient, Raúl Marie, to the [facility]. Please see a copy of my visit note below.      CHIEF COMPLAINT   It was my pleasure to see Raúl Marie who is a 67 year old male for follow-up of ***.      HPI   Raúl Marie is a very pleasant 67 year old male who presents with a history of {Diagnoses:745696}.  ***     Raúl Marie is a very pleasant 67 year old male who presents with a history of left renal mass. This was noted incidentally on CTA done in the context of recent NSTEMI. He presented for this in early January and has undergone thorough cardiac workup. On 3 months of ASA and Plavix. 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     ##RENAL MASS INITIAL ENCOUNTER##      Raúl Marie is a very pleasant 67 year old male who presents with a history of a Solid renal lesion/mass.  This was discovered incidentally.  Initially diagnosed about 2 week(s) ago.  The lesion is solid and enhancing.  The maximal dimension of the renal lesion is 2.0 centimeters and located on the left side.       Concerning  his renal function, his last SCr is         Lab Results   Component Value Date     CR 1.08 01/06/2023     CR 1.06 01/11/2011   .  he has the following risk factors for development of chronic kidney disease CAD.        he does have a history of previous abdominal or retroperitoneal surgery.  There is not a family history of renal cell cancer.  The patient does not have a history of smoking and currently is not smoking.     The mass/lesion is located in the Left side and is primarily located in the inter polar region.  The tumor is also 50 % endophytic and exophytic.  The mass/lesion primarily lies on the posterior surface.  With regard to the collecting system, the edge of  "the tumor arrives either abuts the collecting system or arrives within 4 mm of the collecting system.     ECOG Performance Score: 0 - Independent     MRI Renal 5/3/2023  IMPRESSION:   1. Three cystic masses with enhancing components in the left kidney.  Most suspicious of these is a 1.2 cm heterogeneously enhancing cystic  mass in the medial interpolar region of the kidney, concerning for  renal neoplasm. Additionally, there is a 1.7 cm and 1.1 cm cysts with  enhancing components in the lateral and inferomedial aspect of the  left kidney which are Bosniak 3 equivalents(intermediate probability  of being malignant).  2. 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  3. 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    PHYSICAL EXAM  Patient is a 67 year old  male   Vitals: Blood pressure 112/76, pulse 71, height 1.727 m (5' 8\"), weight 70.3 kg (155 lb), SpO2 94 %.  General Appearance Adult: Body mass index is 23.57 kg/m .  Alert, no acute distress, oriented  Lungs: no respiratory distress, or pursed lip breathing  Abdomen: soft, nontender, no organomegaly or masses; ***  Back: *** CVAT  Neuro: Alert, oriented, speech and mentation normal  Psych: affect and mood normal  : {TANGELA EXAM MALE GENITAL:097844}    Outside and Past Medical records:  Assessment requiring an independent historian(s) - {:926419}  Review of prior external note(s) from - {note reviewed source:246364}  Review of the result(s) of each unique test - ***    ASSESSMENT and PLAN  ***      *** minutes spent on the date of the encounter doing chart review, history and exam, documentation and further activities as noted above.    Jose Elias Cuevas MD  Urology  [facility]        Again, thank you for allowing me to participate in the care of your patient.      Sincerely,    Jose Elias Cuevas MD    "

## 2023-05-04 NOTE — PROGRESS NOTES
"  CHIEF COMPLAINT   It was my pleasure to see Raúl Marie who is a 67 year old male for follow-up of left renal mass.      HPI  Raúl Marie is a very pleasant 67 year old male who presents with a history of left renal mass. This was noted incidentally on CTA done in the context of NSTEMI. He presented for this in early January 2022.  The maximal dimension of the renal lesion is 2.0 centimeters and located on the left side.    He also notes some intermittent scrotal/groin discomfort that he thinks is a hernia.    Creat 1.18     MRI Renal 5/3/2023  IMPRESSION:   1. Three cystic masses with enhancing components in the left kidney.  Most suspicious of these is a 1.2 cm heterogeneously enhancing cystic  mass in the medial interpolar region of the kidney, concerning for  renal neoplasm. Additionally, there is a 1.7 cm and 1.1 cm cysts with  enhancing components in the lateral and inferomedial aspect of the  left kidney which are Bosniak 3 equivalents(intermediate probability  of being malignant).  2. 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  3. 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    PHYSICAL EXAM  Patient is a 67 year old  male   Vitals: Blood pressure 112/76, pulse 71, height 1.727 m (5' 8\"), weight 70.3 kg (155 lb), SpO2 94 %.  General Appearance Adult: Body mass index is 23.57 kg/m .  Alert, no acute distress, oriented  Lungs: no respiratory distress, or pursed lip breathing  Abdomen: soft, nontender, no organomegaly or masses  Back: no CVAT  Neuro: Alert, oriented, speech and mentation normal  Psych: affect and mood normal  : penis, scrotum, testes normal    Outside and Past Medical records:  Review of the result(s) of each unique test - MRI, creat    ASSESSMENT and PLAN  67 year old male with cystic lesions in the left kidney. Largest is 1.7 cm. The largest suspicious lesion is 1.2 cm. We discussed this in detail today and that the stability of these small lesions is " reassuring. Given they are small in size, ongoing surveillance is reasonable. We discussed this in great detail today. Will plan MRI in 6 months.    - Follow-up 6 months with MRI kidney and labs    23 minutes spent on the date of the encounter doing chart review, history and exam, documentation and further activities as noted above.    Jose Elias Cuevas MD  Urology  [facility]

## 2023-05-04 NOTE — NURSING NOTE
Chief Complaint   Patient presents with     lt renal mass      Follow Up     Labs done on 5-1 2023,also M.R. RENAL w/wo contrast date done on 05-   talk about the mr results and lab results too   John Giraldo, clinic assistant

## 2023-05-17 ENCOUNTER — TELEPHONE (OUTPATIENT)
Dept: CARDIOLOGY | Facility: CLINIC | Age: 68
End: 2023-05-17
Payer: MEDICARE

## 2023-05-18 NOTE — TELEPHONE ENCOUNTER
Call out to pt re: upcoming procedure.    Pt states he's having stents placed in his eyes for glaucoma. One eye to be done on 5/22 and other eye on 6/12. Pt did not ask his PCP or the eye surgeon whether he needs to hold either of these medications prior to the procedure or whether it can be done on his medications. Pt isn't sure if they're aware he's on asa 81 mg daily and Plavix 75 mg daily. Pt will call eye surgeon to discuss and will call us back with more information as well as type of anesthesia he will be getting. Martha Sun RN on 5/18/2023 at 11:29 AM

## 2023-05-31 DIAGNOSIS — I21.4 NON-STEMI (NON-ST ELEVATED MYOCARDIAL INFARCTION) (H): ICD-10-CM

## 2023-05-31 RX ORDER — CLOPIDOGREL BISULFATE 75 MG/1
75 TABLET ORAL DAILY
Qty: 90 TABLET | Refills: 3 | Status: SHIPPED | OUTPATIENT
Start: 2023-05-31 | End: 2023-07-07

## 2023-06-01 ENCOUNTER — TRANSFERRED RECORDS (OUTPATIENT)
Dept: HEALTH INFORMATION MANAGEMENT | Facility: CLINIC | Age: 68
End: 2023-06-01
Payer: MEDICARE

## 2023-06-02 ENCOUNTER — TRANSCRIBE ORDERS (OUTPATIENT)
Dept: OTHER | Age: 68
End: 2023-06-02

## 2023-06-02 ENCOUNTER — TELEPHONE (OUTPATIENT)
Dept: OPHTHALMOLOGY | Facility: CLINIC | Age: 68
End: 2023-06-02
Payer: MEDICARE

## 2023-06-02 DIAGNOSIS — H53.2 DIPLOPIA: Primary | ICD-10-CM

## 2023-06-02 NOTE — TELEPHONE ENCOUNTER
Spoke to pt at 1705    Intermittent diplopia for possible years and seen by Dr. Hanley and referred to [facility]    Scheduled with Dr. Tyson first available July 28th     Pt aware of date/time/location/duration/non-humana insurance/parking/main clinic number/hospital based clinic    Pt would appreciate new patient packet    Note to facilitator for review of notes once received/amend to scheduling if applicable.    Pt seemed comfortable with current scheduling    Gal Ferris RN 5:14 PM 06/02/23    ---        Home/mobile [phone]     Left message with direct number at [phone]    Gal Ferris RN 3:53 PM 06/02/23           Health Call Center    Phone Message    May a detailed message be left on voicemail: yes     Reason for Call: Appointment Intake    Referring Provider Name: Davonte Hanley MD  Diagnosis and/or Symptoms: Diplopia    Action Taken: Message routed to:  [facility] Ophthalmology    Travel Screening: Not Applicable

## 2023-07-06 NOTE — PROGRESS NOTES
~Cardiology Clinic Visit~    Primary Cardiologist: Dr. Muniz  Reason for visit: 3-month follow up    History of Present Illness  Raúl Marie is a very pleasant 67 year old male with a past medical history notable for HLD, IgA nephropathy, glaucoma, recent severe GI bleed with recent hemorrhoidectomy, NSTEMI with SCAD, new diagnosis fibromuscular dysplasia.     Patient was recently admitted to [facility] on 1/4/2023 with chest pain and elevated troponin.  He presented with typical ACS, and underwent coronary angiography that same day.  Study revealed spontaneous coronary artery dissection with underlying coronary artery disease involving the diagonal, obtuse marginal, RPAV/RPL branches.     He was initiated on aspirin and Plavix for 3 months per the most recent JAC guidelines, with aspirin indefinitely thereafter.  He was also started on Imdur 30 mg daily.  Patient noted acute onset of visual changes after his coronary angiogram which prompted a CT scan of the head.  Imaging was negative for CVA, however showed a vascular appearance concerning for fibromuscular dysplasia in the carotid arteries.     His echocardiogram shows preserved EF at 55-60%, with normal LV wall motion without evidence of LV mass or tumor.  There is mild 1+ aortic regurgitation.    He has continued to have episodic chest pain symptoms since then.  At one point, he had pain throughout the night and came in to the emergency room at [facility] on 03/21/2023 for evaluation.  Fortunately, his ECG did not show any acute ischemic changes and his echocardiogram was normal.  His ejection fraction was 60%-65% with no regional wall motion abnormalities.  His troponin levels were normal on 3 successive values.  Isosorbide mononitrate was increased from 30 up to 60 mg a day.    Interval 07/07/23    He is feeling well today.  Continues to remain active with daily exercise and light weight training.  He has no worsening of anginal  symptoms with exertion.  He continues on DAPT without issues of bleeding.  No lilliam chest pain.  Again today, he had a host of questions about his cardiac health and events of this year.  All of which I was happy to discuss with him in detail.  __________________________________________________________________         Assessment and Impression:     NSTEMI secondary to SCAD  SCAD of diagonal, OM and RPAV/RPL branches  Underlying coronary artery disease  Fibromuscular dysplasia, new diagnosis  - Recovering well without further e/o ongoing ischemia or infarct.  - LV function normal.  - FMD noted on CT of head/neck in the carotids without evidence of significant stenosis.  - In consideration of NSTEMI and underlying CAD, currently treated with DAPT x3 months.  - Outpatient genetic counseling scheduled.  - On Imdur 30 mg daily with slight, <1/10 residual chest discomfort.  - Continued daily exercise to promote collateral circulatory support.     Dyslipidemia, excellent control on statin therapy; , LDL 62.  IgA nephropathy, with baseline creatinine about 1.1-1.2, stable.  Kidney nodule, incidental finding during FMD workup; deemed benign and no biopsy needed.  Polypharmacy, Pt following with a naturopath for adrenal fatigue, mental fogginess etc.         Recommendations and Plan:     1. Recheck labs today.  2. Ok to discontinue clopidogrel.  3. Again today, I have provided him education about the development of collateral coronary circulation to help relieve areas of myocardial ischemia. He is encouraged to continue a regular exercise routine.   4. Follow up with Dr. Muniz in about 6-month for routine visit.  __________________________________________________________________    Thank you for the opportunity to participate in this pleasant patient's care.    We would be happy to see this patient sooner for any concerns in the meantime.    Addendum: Laboratory results after the completion of the visit showed excellent  lipid profile and stable renal function and electrolytes.  NO changes needed to current plan of care.    ABIODUN Bennett, CNP   Nurse Practitioner  [facility]    Today's clinic visit entailed:  Review of the result(s) of each unique test - cardiac testing and imaging, CT scan, labs  Ordering of each unique test  Prescription drug management    The level of medical decision making during this visit was of moderate complexity.    Orders this Visit:  Orders Placed This Encounter   Procedures     Follow-Up with Cardiology     Orders Placed This Encounter   Medications     isosorbide mononitrate (IMDUR) 60 MG 24 hr tablet     Sig: Take 1 tablet (60 mg) by mouth daily     Dispense:  90 tablet     Refill:  3     metoprolol tartrate (LOPRESSOR) 25 MG tablet     Sig: Take 0.5 tablets (12.5 mg) by mouth 2 times daily     Dispense:  90 tablet     Refill:  3     Medications Discontinued During This Encounter   Medication Reason     clopidogrel (PLAVIX) 75 MG tablet      latanoprostene bunod (VYZULTA) 0.024 % SOLN ophthalmic solution      metoprolol tartrate (LOPRESSOR) 25 MG tablet Reorder (No AVS / No eCancel)     isosorbide mononitrate (IMDUR) 60 MG 24 hr tablet Reorder (No AVS / No eCancel)     Encounter Diagnoses   Name Primary?     Non-STEMI (non-ST elevated myocardial infarction) (H)      Spontaneous dissection of coronary artery      Chest pain, unspecified type      Fibromuscular dysplasia of carotid artery (H)      CURRENT MEDICATIONS:  Current Outpatient Medications   Medication Sig Dispense Refill     aspirin (ASA) 81 MG EC tablet Take 1 tablet (81 mg) by mouth daily 100 tablet 0     atorvastatin (LIPITOR) 40 MG tablet Take 1 tablet (40 mg) by mouth daily 90 tablet 3     isosorbide mononitrate (IMDUR) 60 MG 24 hr tablet Take 1 tablet (60 mg) by mouth daily 90 tablet 3     metoprolol tartrate (LOPRESSOR) 25 MG tablet Take 0.5 tablets (12.5 mg) by mouth 2 times daily 90 tablet 3      "nitroGLYcerin (NITROSTAT) 0.4 MG sublingual tablet For chest pain place 1 tablet under the tongue every 5 minutes for 3 doses. If symptoms persist 5 minutes after 1st dose call 911. 15 tablet 0     psyllium (METAMUCIL/KONSYL) Packet Take 1 packet by mouth daily 30 packet 0     tamsulosin (FLOMAX) 0.4 MG capsule Take 0.4 mg by mouth daily       ALLERGIES     Allergies   Allergen Reactions     Chlorpheniramine-Phenylephrine Itching and Other (See Comments)     Other reaction(s): Other (See Comments)  Other reaction(s): Runny Nose     Ferrous Sulfate      Other reaction(s): constipation     Nuts      Other reaction(s): Unknown     Wheat Extract      PAST MEDICAL, SURGICAL, FAMILY, SOCIAL HISTORY:  History was reviewed and updated as needed, see medical record.    Review of Systems:  A 10-point Review Of Systems is otherwise normal except for that which is noted in the HPI and interval summary.    Physical Exam:    Vitals: BP 99/66   Pulse 60   Resp 17   Ht 1.6 m (5' 3\")   Wt 70.8 kg (156 lb)   SpO2 98%   BMI 27.63 kg/m    Constitutional: Appears stated age, well nourished, NAD.  Eyes: Pupils equal, round. Sclerae anicteric.   HEENT: Normocephalic, atraumatic.   Neck: Supple. Carotid pulses full and equal. No carotid bruit.  Respiratory: Non-labored. Lungs CTAB.  Cardiovascular: RRR, normal S1 and S2. No M/G/R.  No edema.  GI: Soft, non-distended, non-tender.  Skin: Warm and dry. No rashes, cyanosis, edema.  Musculoskeletal/Extremities: Symmetrical movement to all extremities.  Neurologic: No gross focal deficits. Alert, awake, and oriented to all spheres.  Psychiatric: Affect appropriate. Mentation normal.    Recent Lab Results:  LIPID RESULTS:  Lab Results   Component Value Date    CHOL 118 07/07/2023    HDL 47 07/07/2023    LDL 62 07/07/2023    TRIG 44 07/07/2023     LIVER ENZYME RESULTS:  Lab Results   Component Value Date    AST 27 01/04/2023    AST 27 01/11/2011    ALT 43 07/07/2023    ALT 41 01/11/2011 "     CBC RESULTS:  Lab Results   Component Value Date    WBC 7.1 05/01/2023    WBC 8.4 01/11/2011    RBC 4.50 05/01/2023    RBC 5.10 01/11/2011    HGB 14.0 05/01/2023    HGB 15.1 01/11/2011    HCT 41.6 05/01/2023    HCT 44.6 01/11/2011    MCV 92 05/01/2023    MCV 88 01/11/2011    MCH 31.1 05/01/2023    MCH 29.6 01/11/2011    MCHC 33.7 05/01/2023    MCHC 33.9 01/11/2011    RDW 14.0 05/01/2023    RDW 13.9 01/11/2011     05/01/2023     01/11/2011     BMP RESULTS:  Lab Results   Component Value Date     07/07/2023     01/11/2011    POTASSIUM 4.2 07/07/2023    POTASSIUM 4.0 01/06/2023    POTASSIUM 4.0 01/11/2011    CHLORIDE 102 07/07/2023    CHLORIDE 105 01/06/2023    CHLORIDE 105 01/11/2011    CO2 27 07/07/2023    CO2 26 01/06/2023    CO2 25 01/11/2011    ANIONGAP 10 07/07/2023    ANIONGAP 8 01/06/2023    ANIONGAP 10 01/11/2011    GLC 78 07/07/2023    GLC 96 01/06/2023    GLC 95 01/11/2011    BUN 27.1 (H) 07/07/2023    BUN 20 01/06/2023    BUN 24 01/11/2011    CR 1.16 07/07/2023    CR 1.06 01/11/2011    GFRESTIMATED 69 07/07/2023    GFRESTIMATED 73 01/11/2011    GFRESTBLACK 88 01/11/2011    SONA 9.3 07/07/2023    SONA 9.3 01/11/2011      A1C RESULTS:  Lab Results   Component Value Date    A1C 4.9 01/04/2023     INR RESULTS:  Lab Results   Component Value Date    INR 1.11 10/13/2022    INR 1.10 10/05/2022

## 2023-07-07 ENCOUNTER — LAB (OUTPATIENT)
Dept: LAB | Facility: CLINIC | Age: 68
End: 2023-07-07
Payer: MEDICARE

## 2023-07-07 ENCOUNTER — OFFICE VISIT (OUTPATIENT)
Dept: CARDIOLOGY | Facility: CLINIC | Age: 68
End: 2023-07-07
Attending: INTERNAL MEDICINE
Payer: MEDICARE

## 2023-07-07 VITALS
WEIGHT: 156 LBS | RESPIRATION RATE: 17 BRPM | SYSTOLIC BLOOD PRESSURE: 99 MMHG | HEIGHT: 63 IN | HEART RATE: 60 BPM | BODY MASS INDEX: 27.64 KG/M2 | DIASTOLIC BLOOD PRESSURE: 66 MMHG | OXYGEN SATURATION: 98 %

## 2023-07-07 DIAGNOSIS — R07.9 CHEST PAIN, UNSPECIFIED TYPE: ICD-10-CM

## 2023-07-07 DIAGNOSIS — E78.5 HYPERLIPIDEMIA LDL GOAL <70: ICD-10-CM

## 2023-07-07 DIAGNOSIS — I21.4 NON-STEMI (NON-ST ELEVATED MYOCARDIAL INFARCTION) (H): ICD-10-CM

## 2023-07-07 DIAGNOSIS — I77.3 FIBROMUSCULAR DYSPLASIA OF CAROTID ARTERY (H): ICD-10-CM

## 2023-07-07 DIAGNOSIS — I25.42 SPONTANEOUS DISSECTION OF CORONARY ARTERY: ICD-10-CM

## 2023-07-07 LAB
ALT SERPL W P-5'-P-CCNC: 43 U/L (ref 0–70)
ANION GAP SERPL CALCULATED.3IONS-SCNC: 10 MMOL/L (ref 7–15)
BUN SERPL-MCNC: 27.1 MG/DL (ref 8–23)
CALCIUM SERPL-MCNC: 9.3 MG/DL (ref 8.8–10.2)
CHLORIDE SERPL-SCNC: 102 MMOL/L (ref 98–107)
CHOLEST SERPL-MCNC: 118 MG/DL
CREAT SERPL-MCNC: 1.16 MG/DL (ref 0.67–1.17)
DEPRECATED HCO3 PLAS-SCNC: 27 MMOL/L (ref 22–29)
GFR SERPL CREATININE-BSD FRML MDRD: 69 ML/MIN/1.73M2
GLUCOSE SERPL-MCNC: 78 MG/DL (ref 70–99)
HDLC SERPL-MCNC: 47 MG/DL
LDLC SERPL CALC-MCNC: 62 MG/DL
NONHDLC SERPL-MCNC: 71 MG/DL
POTASSIUM SERPL-SCNC: 4.2 MMOL/L (ref 3.4–5.3)
SODIUM SERPL-SCNC: 139 MMOL/L (ref 136–145)
TRIGL SERPL-MCNC: 44 MG/DL

## 2023-07-07 PROCEDURE — 99214 OFFICE O/P EST MOD 30 MIN: CPT | Performed by: NURSE PRACTITIONER

## 2023-07-07 PROCEDURE — 80061 LIPID PANEL: CPT | Performed by: INTERNAL MEDICINE

## 2023-07-07 PROCEDURE — 84460 ALANINE AMINO (ALT) (SGPT): CPT | Performed by: INTERNAL MEDICINE

## 2023-07-07 PROCEDURE — 36415 COLL VENOUS BLD VENIPUNCTURE: CPT | Performed by: INTERNAL MEDICINE

## 2023-07-07 PROCEDURE — 80048 BASIC METABOLIC PNL TOTAL CA: CPT | Performed by: INTERNAL MEDICINE

## 2023-07-07 RX ORDER — METOPROLOL TARTRATE 25 MG/1
12.5 TABLET, FILM COATED ORAL 2 TIMES DAILY
Qty: 90 TABLET | Refills: 3 | Status: SHIPPED | OUTPATIENT
Start: 2023-07-07 | End: 2024-01-08 | Stop reason: ALTCHOICE

## 2023-07-07 RX ORDER — ISOSORBIDE MONONITRATE 60 MG/1
60 TABLET, EXTENDED RELEASE ORAL DAILY
Qty: 90 TABLET | Refills: 3 | Status: SHIPPED | OUTPATIENT
Start: 2023-07-07

## 2023-07-07 NOTE — PATIENT INSTRUCTIONS
Thank you for your visit with the [facility] today.    Today's Summary     Recheck labs today if they are able to get you in.  Ok to discontinue clopidogrel (Plavix)  Continue daily exercise and symptom monitoring as you continue to recover  Follow up in about 6-months with Dr. Muniz.    If you have questions or concerns, please do not hesitate to call my nursing support team at [phone].    Scheduling phone number: [phone]  [facility] Clinic Number: [phone]    It was a pleasure seeing you today.     ABIODUN Bennett, CNP  Nurse Practitioner  [facility]  July 7, 2023  ________________________________________________________

## 2023-07-07 NOTE — LETTER
7/7/2023    Meenu Gusman MD  [facility] [address]  [facility] [address]    RE: Raúl Marie       Dear Colleague,     I had the pleasure of seeing Raúl Marie in the [facility].              ~Cardiology Clinic Visit~    Primary Cardiologist: Dr. Muniz  Reason for visit: 3-month follow up    History of Present Illness  Raúl Marie is a very pleasant 67 year old male with a past medical history notable for HLD, IgA nephropathy, glaucoma, recent severe GI bleed with recent hemorrhoidectomy, NSTEMI with SCAD, new diagnosis fibromuscular dysplasia.     Patient was recently admitted to [facility] on 1/4/2023 with chest pain and elevated troponin.  He presented with typical ACS, and underwent coronary angiography that same day.  Study revealed spontaneous coronary artery dissection with underlying coronary artery disease involving the diagonal, obtuse marginal, RPAV/RPL branches.     He was initiated on aspirin and Plavix for 3 months per the most recent JACC guidelines, with aspirin indefinitely thereafter.  He was also started on Imdur 30 mg daily.  Patient noted acute onset of visual changes after his coronary angiogram which prompted a CT scan of the head.  Imaging was negative for CVA, however showed a vascular appearance concerning for fibromuscular dysplasia in the carotid arteries.     His echocardiogram shows preserved EF at 55-60%, with normal LV wall motion without evidence of LV mass or tumor.  There is mild 1+ aortic regurgitation.    He has continued to have episodic chest pain symptoms since then.  At one point, he had pain throughout the night and came in to the emergency room at [facility] on 03/21/2023 for evaluation.  Fortunately, his ECG did not show any acute ischemic changes and his echocardiogram was normal.  His ejection fraction was 60%-65% with no regional wall motion abnormalities.  His troponin levels were normal on 3  successive values.  Isosorbide mononitrate was increased from 30 up to 60 mg a day.    Interval 07/07/23    He is feeling well today.  Continues to remain active with daily exercise and light weight training.  He has no worsening of anginal symptoms with exertion.  He continues on DAPT without issues of bleeding.  No lilliam chest pain.  Again today, he had a host of questions about his cardiac health and events of this year.  All of which I was happy to discuss with him in detail.  __________________________________________________________________         Assessment and Impression:     NSTEMI secondary to SCAD  SCAD of diagonal, OM and RPAV/RPL branches  Underlying coronary artery disease  Fibromuscular dysplasia, new diagnosis  - Recovering well without further e/o ongoing ischemia or infarct.  - LV function normal.  - FMD noted on CT of head/neck in the carotids without evidence of significant stenosis.  - In consideration of NSTEMI and underlying CAD, currently treated with DAPT x3 months.  - Outpatient genetic counseling scheduled.  - On Imdur 30 mg daily with slight, <1/10 residual chest discomfort.  - Continued daily exercise to promote collateral circulatory support.     Dyslipidemia, excellent control on statin therapy; , LDL 62.  IgA nephropathy, with baseline creatinine about 1.1-1.2, stable.  Kidney nodule, incidental finding during FMD workup; deemed benign and no biopsy needed.  Polypharmacy, Pt following with a naturopath for adrenal fatigue, mental fogginess etc.         Recommendations and Plan:     Recheck labs today.  Ok to discontinue clopidogrel.  Again today, I have provided him education about the development of collateral coronary circulation to help relieve areas of myocardial ischemia. He is encouraged to continue a regular exercise routine.   Follow up with Dr. Muniz in about 6-month for routine visit.  __________________________________________________________________    Thank you for  the opportunity to participate in this pleasant patient's care.    We would be happy to see this patient sooner for any concerns in the meantime.    Addendum: Laboratory results after the completion of the visit showed excellent lipid profile and stable renal function and electrolytes.  NO changes needed to current plan of care.    ABIODUN Bennett, CNP   Nurse Practitioner  [facility]    Today's clinic visit entailed:  Review of the result(s) of each unique test - cardiac testing and imaging, CT scan, labs  Ordering of each unique test  Prescription drug management    The level of medical decision making during this visit was of moderate complexity.    Orders this Visit:  Orders Placed This Encounter   Procedures    Follow-Up with Cardiology     Orders Placed This Encounter   Medications    isosorbide mononitrate (IMDUR) 60 MG 24 hr tablet     Sig: Take 1 tablet (60 mg) by mouth daily     Dispense:  90 tablet     Refill:  3    metoprolol tartrate (LOPRESSOR) 25 MG tablet     Sig: Take 0.5 tablets (12.5 mg) by mouth 2 times daily     Dispense:  90 tablet     Refill:  3     Medications Discontinued During This Encounter   Medication Reason    clopidogrel (PLAVIX) 75 MG tablet     latanoprostene bunod (VYZULTA) 0.024 % SOLN ophthalmic solution     metoprolol tartrate (LOPRESSOR) 25 MG tablet Reorder (No AVS / No eCancel)    isosorbide mononitrate (IMDUR) 60 MG 24 hr tablet Reorder (No AVS / No eCancel)     Encounter Diagnoses   Name Primary?    Non-STEMI (non-ST elevated myocardial infarction) (H)     Spontaneous dissection of coronary artery     Chest pain, unspecified type     Fibromuscular dysplasia of carotid artery (H)      CURRENT MEDICATIONS:  Current Outpatient Medications   Medication Sig Dispense Refill    aspirin (ASA) 81 MG EC tablet Take 1 tablet (81 mg) by mouth daily 100 tablet 0    atorvastatin (LIPITOR) 40 MG tablet Take 1 tablet (40 mg) by mouth daily 90 tablet 3     "isosorbide mononitrate (IMDUR) 60 MG 24 hr tablet Take 1 tablet (60 mg) by mouth daily 90 tablet 3    metoprolol tartrate (LOPRESSOR) 25 MG tablet Take 0.5 tablets (12.5 mg) by mouth 2 times daily 90 tablet 3    nitroGLYcerin (NITROSTAT) 0.4 MG sublingual tablet For chest pain place 1 tablet under the tongue every 5 minutes for 3 doses. If symptoms persist 5 minutes after 1st dose call 911. 15 tablet 0    psyllium (METAMUCIL/KONSYL) Packet Take 1 packet by mouth daily 30 packet 0    tamsulosin (FLOMAX) 0.4 MG capsule Take 0.4 mg by mouth daily       ALLERGIES     Allergies   Allergen Reactions    Chlorpheniramine-Phenylephrine Itching and Other (See Comments)     Other reaction(s): Other (See Comments)  Other reaction(s): Runny Nose    Ferrous Sulfate      Other reaction(s): constipation    Nuts      Other reaction(s): Unknown    Wheat Extract      PAST MEDICAL, SURGICAL, FAMILY, SOCIAL HISTORY:  History was reviewed and updated as needed, see medical record.    Review of Systems:  A 10-point Review Of Systems is otherwise normal except for that which is noted in the HPI and interval summary.    Physical Exam:    Vitals: BP 99/66   Pulse 60   Resp 17   Ht 1.6 m (5' 3\")   Wt 70.8 kg (156 lb)   SpO2 98%   BMI 27.63 kg/m    Constitutional: Appears stated age, well nourished, NAD.  Eyes: Pupils equal, round. Sclerae anicteric.   HEENT: Normocephalic, atraumatic.   Neck: Supple. Carotid pulses full and equal. No carotid bruit.  Respiratory: Non-labored. Lungs CTAB.  Cardiovascular: RRR, normal S1 and S2. No M/G/R.  No edema.  GI: Soft, non-distended, non-tender.  Skin: Warm and dry. No rashes, cyanosis, edema.  Musculoskeletal/Extremities: Symmetrical movement to all extremities.  Neurologic: No gross focal deficits. Alert, awake, and oriented to all spheres.  Psychiatric: Affect appropriate. Mentation normal.    Recent Lab Results:  LIPID RESULTS:  Lab Results   Component Value Date    CHOL 118 07/07/2023    HDL " 47 07/07/2023    LDL 62 07/07/2023    TRIG 44 07/07/2023     LIVER ENZYME RESULTS:  Lab Results   Component Value Date    AST 27 01/04/2023    AST 27 01/11/2011    ALT 43 07/07/2023    ALT 41 01/11/2011     CBC RESULTS:  Lab Results   Component Value Date    WBC 7.1 05/01/2023    WBC 8.4 01/11/2011    RBC 4.50 05/01/2023    RBC 5.10 01/11/2011    HGB 14.0 05/01/2023    HGB 15.1 01/11/2011    HCT 41.6 05/01/2023    HCT 44.6 01/11/2011    MCV 92 05/01/2023    MCV 88 01/11/2011    MCH 31.1 05/01/2023    MCH 29.6 01/11/2011    MCHC 33.7 05/01/2023    MCHC 33.9 01/11/2011    RDW 14.0 05/01/2023    RDW 13.9 01/11/2011     05/01/2023     01/11/2011     BMP RESULTS:  Lab Results   Component Value Date     07/07/2023     01/11/2011    POTASSIUM 4.2 07/07/2023    POTASSIUM 4.0 01/06/2023    POTASSIUM 4.0 01/11/2011    CHLORIDE 102 07/07/2023    CHLORIDE 105 01/06/2023    CHLORIDE 105 01/11/2011    CO2 27 07/07/2023    CO2 26 01/06/2023    CO2 25 01/11/2011    ANIONGAP 10 07/07/2023    ANIONGAP 8 01/06/2023    ANIONGAP 10 01/11/2011    GLC 78 07/07/2023    GLC 96 01/06/2023    GLC 95 01/11/2011    BUN 27.1 (H) 07/07/2023    BUN 20 01/06/2023    BUN 24 01/11/2011    CR 1.16 07/07/2023    CR 1.06 01/11/2011    GFRESTIMATED 69 07/07/2023    GFRESTIMATED 73 01/11/2011    GFRESTBLACK 88 01/11/2011    SONA 9.3 07/07/2023    SONA 9.3 01/11/2011      A1C RESULTS:  Lab Results   Component Value Date    A1C 4.9 01/04/2023     INR RESULTS:  Lab Results   Component Value Date    INR 1.11 10/13/2022    INR 1.10 10/05/2022           Thank you for allowing me to participate in the care of your patient.      Sincerely,     ABIODUN Bennett [facility]  cc:   Justo Muniz MD  [address]  [place] [address]

## 2023-07-28 ENCOUNTER — OFFICE VISIT (OUTPATIENT)
Dept: OPHTHALMOLOGY | Facility: CLINIC | Age: 68
End: 2023-07-28
Attending: OPHTHALMOLOGY
Payer: MEDICARE

## 2023-07-28 DIAGNOSIS — H53.2 DIPLOPIA: ICD-10-CM

## 2023-07-28 DIAGNOSIS — H53.2 DOUBLE VISION: ICD-10-CM

## 2023-07-28 DIAGNOSIS — H53.10 SUBJECTIVE VISUAL DISTURBANCE: ICD-10-CM

## 2023-07-28 DIAGNOSIS — H50.05 ALTERNATING ESOTROPIA: Primary | ICD-10-CM

## 2023-07-28 PROCEDURE — 92060 SENSORIMOTOR EXAMINATION: CPT | Performed by: OPHTHALMOLOGY

## 2023-07-28 PROCEDURE — 99204 OFFICE O/P NEW MOD 45 MIN: CPT | Mod: GC | Performed by: OPHTHALMOLOGY

## 2023-07-28 PROCEDURE — 92015 DETERMINE REFRACTIVE STATE: CPT | Mod: GY

## 2023-07-28 PROCEDURE — G0463 HOSPITAL OUTPT CLINIC VISIT: HCPCS | Performed by: OPHTHALMOLOGY

## 2023-07-28 ASSESSMENT — CUP TO DISC RATIO
OS_RATIO: 0.5
OD_RATIO: 0.8

## 2023-07-28 ASSESSMENT — EXTERNAL EXAM - LEFT EYE: OS_EXAM: NORMAL

## 2023-07-28 ASSESSMENT — REFRACTION_WEARINGRX
OD_SPHERE: -4.25
OS_AXIS: 010
OS_CYLINDER: +2.50
OS_SPHERE: -4.50
SPECS_TYPE: BIFOCAL
OS_ADD: +2.75
OD_ADD: +2.75
OD_CYLINDER: SPHERE

## 2023-07-28 ASSESSMENT — CONF VISUAL FIELD
OD_SUPERIOR_TEMPORAL_RESTRICTION: 0
OS_NORMAL: 1
OS_INFERIOR_TEMPORAL_RESTRICTION: 0
OS_SUPERIOR_NASAL_RESTRICTION: 0
OS_INFERIOR_NASAL_RESTRICTION: 0
METHOD: COUNTING FINGERS
OD_NORMAL: 1
OD_INFERIOR_TEMPORAL_RESTRICTION: 0
OS_SUPERIOR_TEMPORAL_RESTRICTION: 0
OD_SUPERIOR_NASAL_RESTRICTION: 0
OD_INFERIOR_NASAL_RESTRICTION: 0

## 2023-07-28 ASSESSMENT — VISUAL ACUITY
OS_CC: 20/50
OS_PH_CC: 20/20
OS_PH_CC+: -2
CORRECTION_TYPE: GLASSES
OS_CC+: +2
OD_CC: 20/25
METHOD: SNELLEN - LINEAR
OD_CC+: -3
OD_PH_CC: 20/20

## 2023-07-28 ASSESSMENT — REFRACTION_MANIFEST
OS_SPHERE: -4.50
OS_CYLINDER: +2.25
OD_CYLINDER: SPHERE
OS_AXIS: 180
OD_SPHERE: -4.75

## 2023-07-28 ASSESSMENT — TONOMETRY
IOP_METHOD: ICARE
OD_IOP_MMHG: 14
OS_IOP_MMHG: 12

## 2023-07-28 ASSESSMENT — REFRACTION_FINALRX
OD_HBASE: OUT
OS_HBASE: OUT
OD_HPRISM: 2.0
OS_HPRISM: 2.0

## 2023-07-28 ASSESSMENT — SLIT LAMP EXAM - LIDS
COMMENTS: NORMAL
COMMENTS: NORMAL

## 2023-07-28 ASSESSMENT — EXTERNAL EXAM - RIGHT EYE: OD_EXAM: NORMAL

## 2023-07-28 NOTE — PROGRESS NOTES
1. 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     Recommend prism glasses over strabismus surgery given small angle of intermittent esotropia. Give patient total of 4 base out divided equally in both lenses.  I recommended to him that as long as his prism glasses alleviate his double vision that in the future when he updates his refractive error for new glasses that he should keep the same amount of prism.  If he is having increasing symptoms of double vision despite prism then he may need an update with increased prism.  It is common over years to require increasing prism with sagging eye syndrome.    2. Monocular diplopia and central blur in the left more than right eye along with symptoms of mild glare:  I believe patient has some mild cataract related symptoms that are likely to slowly increase over time.  I recommend that he continue to follow with his home ophthalmologist Dr. Hanley and it would not surprise me if he ends up benefiting/requiring cataract surgery in the next 1 to 5 years.    3. Decreased blink rate and 8 years of bilateral upper extremity tremor. Mild occasional hand tremor seen today.  Patient ambulates very well in clinic. No other clear signs of Parkinson's disease on neuro-ophthalmologic exam.  Recommend observation.  In the future if patient begins having increasing difficulty with walking or other symptoms of parkinsonism then he may benefit from evaluation by a neurologist with expertise in Parkinson's disease.  I did not see enough today on my exam to warrant evaluation at this time.    4. Open angle glaucoma- good intraocular pressure today. Patient to follow-up with   [facility] for this.  Good blebs today status post recent xen stent placement. Blebs julian negative.    I did not make a follow-up appointment, but I would be happy to see the patient back in the future should any new neuro-ophthalmic concern arise.    Raúl Marie is a pleasant 67 year old White male who presents to my neuro-ophthalmology clinic today having been referred by Dr. Hanley for double vision.    HPI:    Having horizontal double vision that worsens later in the day for about 4-5 years but feeling like it's getting worse. Has noticed it during the day as well and its worse when driving or looking far in the distance. Diplopia includes horizontal images always and this is noted only at distance gaze.     Denies difficulty swallowing or choking, no extremity weakness. Also noticing a small smudging of his vision centrally in each eye of different proportion (more in the left eye). Also noticed double vision in just is left eye. Denies any head injuries recently or around when symptoms started. Patient had a xen stent placed in both eyes a month ago for glaucoma management and history of laser tx for glaucoma. Not currently on glaucoma drops. His current eye doctor Dr. Hanley has been talking about cataract surgery at some time in the future.     Status post xen stents in both eyes 1 month ago.    No family history of Parkinson's disease     Independent historians:  Patient    Review of outside testing:    MRI Brain 1/5/2023  IMPRESSION: Diffuse cerebral volume loss and cerebral white matter  changes consistent with chronic small vessel ischemic disease. No  evidence for acute intracranial pathology.    Review of outside clinical notes:    6/2/2023 -- Visit with Dr. Hanley      Past medical history:    Patient Active Problem List   Diagnosis    Rectal bleeding    Anemia, unspecified type    Non-STEMI (non-ST elevated myocardial infarction) (H)    Spontaneous dissection of coronary artery     Fibromuscular dysplasia of carotid artery (H)    Left renal mass       Patient has a current medication list which includes the following prescription(s): aspirin, atorvastatin, isosorbide mononitrate, metoprolol tartrate, nitroglycerin, psyllium, and tamsulosin, and the following Facility-Administered Medications: gadobutrol.. List is confirmed today.    Family history / social history:  Patient's family history includes Colon Cancer in his paternal grandmother; Diabetes in his father.     Patient  reports that he has never smoked. He has never used smokeless tobacco. He reports current alcohol use. He reports that he does not currently use drugs after having used the following drugs: Marijuana.     Exam:  Vision 20/25 OD, 20/50 OS but ph 20/20 each eye. Monocular blurring left eye more than right eye resolved with pin hole. Anterior exam significant for mixed cataracts and xen stent each eye- good bleb in both eyes and julian negative. Posterior exam significant for moderate cupping with a cup-to-disc ratio of 0.8 in the right eye and 0.5 in the left eye.    No upper extremity rigidity.  Patient had excellent ambulation.  Occasional very subtle hand tremor at rest.  No square wave jerks seen.    Tests ordered and interpreted today:  Sensorimotor examination showed full motility in both eyes.  There was a small 2-4 prism diopter intermittent esotropia in all gaze positions.  He had difficulty maintaining fusion in primary gaze and easily broke into diplopia with 1 prism diopter base in.  With 4 prism diopters base out patient had good fusion in all gaze positions.  There is no pathologic nystagmus.  Smooth pursuit was normal.       45 minutes were spent on the date of the encounter by me doing chart review, history and exam, documentation, and further activities as noted above    Complete documentation of historical and exam elements from today's encounter can be found in the full encounter summary report (not  reduplicated in this progress note).  I personally obtained the chief complaint(s) and history of present illness.  I confirmed and edited as necessary the review of systems, past medical/surgical history, family history, social history, and examination findings as documented by others; and I examined the patient myself.  I personally reviewed the relevant tests, images, and reports as documented above.  I formulated and edited as necessary the assessment and plan and discussed the findings and management plan with the patient and family.  I personally reviewed the ophthalmic test(s) associated with this encounter, agree with the interpretation(s) as documented by the resident/fellow, and have edited the corresponding report(s) as necessary.     Sandro Tyson MD    Precharting:  Summer Kendall MD   Ophthalmology Resident PGY3    Sparkle Bear MD   Fellow, Neuro-Ophthalmology

## 2023-07-28 NOTE — LETTER
2023    RE: Raúl Marie  : 1955  MRN: [id number]    Dear Dr. Hanley    Thank you for referring your patient, Raúl Marie, to my neuro-ophthalmology clinic recently.  After a thorough neuro-ophthalmic history and examination, I came to the following conclusions:     1. 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     Recommend prism glasses over strabismus surgery given small angle of intermittent esotropia. Give patient total of 4 base out divided equally in both lenses.  I recommended to him that as long as his prism glasses alleviate his double vision that in the future when he updates his refractive error for new glasses that he should keep the same amount of prism.  If he is having increasing symptoms of double vision despite prism then he may need an update with increased prism.  It is common over years to require increasing prism with sagging eye syndrome.    2. Monocular diplopia and central blur in the left more than right eye along with symptoms of mild glare:  I believe patient has some mild cataract related symptoms that are likely to slowly increase over time.  I recommend that he continue to follow with his home ophthalmologist Dr. Hanley and it would not surprise me if he ends up benefiting/requiring cataract surgery in the next 1 to 5 years.    3. Decreased blink rate and 8 years of bilateral upper extremity tremor. Mild occasional hand tremor seen today.  Patient ambulates very well in clinic. No other clear signs of Parkinson's disease on neuro-ophthalmologic exam.  Recommend observation.  In the future if patient begins having increasing difficulty with walking  or other symptoms of parkinsonism then he may benefit from evaluation by a neurologist with expertise in Parkinson's disease.  I did not see enough today on my exam to warrant evaluation at this time.    4. Open angle glaucoma- good intraocular pressure today. Patient to follow-up with Dr. Hanley for this.  Good blebs today status post recent xen stent placement. Blebs julian negative.    I did not make a follow-up appointment, but I would be happy to see the patient back in the future should any new neuro-ophthalmic concern arise.    Raúl Marie is a pleasant 67 year old White male who presents to my neuro-ophthalmology clinic today having been referred by Dr. Hanley for double vision.    HPI:    Having horizontal double vision that worsens later in the day for about 4-5 years but feeling like it's getting worse. Has noticed it during the day as well and its worse when driving or looking far in the distance. Diplopia includes horizontal images always and this is noted only at distance gaze.     Denies difficulty swallowing or choking, no extremity weakness. Also noticing a small smudging of his vision centrally in each eye of different proportion (more in the left eye). Also noticed double vision in just is left eye. Denies any head injuries recently or around when symptoms started. Patient had a xen stent placed in both eyes a month ago for glaucoma management and history of laser tx for glaucoma. Not currently on glaucoma drops. His current eye doctor Dr. Hanley has been talking about cataract surgery at some time in the future.     Status post xen stents in both eyes 1 month ago.    No family history of Parkinson's disease     Independent historians:  Patient    Review of outside testing:    MRI Brain 1/5/2023  IMPRESSION: Diffuse cerebral volume loss and cerebral white matter  changes consistent with chronic small vessel ischemic disease. No  evidence for acute intracranial pathology.    Review of  outside clinical notes:    6/2/2023 -- Visit with Dr. Hanley      Past medical history:    Patient Active Problem List   Diagnosis    Rectal bleeding    Anemia, unspecified type    Non-STEMI (non-ST elevated myocardial infarction) (H)    Spontaneous dissection of coronary artery    Fibromuscular dysplasia of carotid artery (H)    Left renal mass       Patient has a current medication list which includes the following prescription(s): aspirin, atorvastatin, isosorbide mononitrate, metoprolol tartrate, nitroglycerin, psyllium, and tamsulosin, and the following Facility-Administered Medications: gadobutrol.. List is confirmed today.    Family history / social history:  Patient's family history includes Colon Cancer in his paternal grandmother; Diabetes in his father.     Patient  reports that he has never smoked. He has never used smokeless tobacco. He reports current alcohol use. He reports that he does not currently use drugs after having used the following drugs: Marijuana.     Exam:  Vision 20/25 OD, 20/50 OS but ph 20/20 each eye. Monocular blurring left eye more than right eye resolved with pin hole. Anterior exam significant for mixed cataracts and xen stent each eye- good bleb in both eyes and julian negative. Posterior exam significant for moderate cupping with a cup-to-disc ratio of 0.8 in the right eye and 0.5 in the left eye.    No upper extremity rigidity.  Patient had excellent ambulation.  Occasional very subtle hand tremor at rest.  No square wave jerks seen.    Tests ordered and interpreted today:  Sensorimotor examination showed full motility in both eyes.  There was a small 2-4 prism diopter intermittent esotropia in all gaze positions.  He had difficulty maintaining fusion in primary gaze and easily broke into diplopia with 1 prism diopter base in.  With 4 prism diopters base out patient had good fusion in all gaze positions.  There is no pathologic nystagmus.  Smooth pursuit was normal.    Again,  thank you for trusting me with the care of your patient.  For further exam details, please feel free to contact our office for additional records.  If you wish to contact me regarding this patient please email me at [email or web address] or give my clinic a call to arrange a phone conversation.    Sincerely,    Sandro Tyson MD  , Neuro-Ophthalmology and Adult Strabismus Surgery  The [facility] in Neuro-Ophthalmology  Department of Ophthalmology and Visual Neurosciences  [facility]    DX: sagging eye syndrome, prism glasses

## 2023-10-25 ENCOUNTER — MYC MEDICAL ADVICE (OUTPATIENT)
Dept: CARDIOLOGY | Facility: CLINIC | Age: 68
End: 2023-10-25
Payer: MEDICARE

## 2023-10-25 PROCEDURE — 99207 PR NO BILLABLE SERVICE THIS VISIT: CPT | Performed by: NURSE PRACTITIONER

## 2023-11-02 ENCOUNTER — TELEPHONE (OUTPATIENT)
Dept: CARDIOLOGY | Facility: CLINIC | Age: 68
End: 2023-11-02
Payer: MEDICARE

## 2023-11-02 NOTE — TELEPHONE ENCOUNTER
Health Call Center    Phone Message    May a detailed message be left on voicemail: yes     Reason for Call: Other: Patient has appt with Pao and labs tomorrow 11/03 and wanting to know if the labs that they are to have after their appt will be fasting or not, so they can prepare for it. Please call patient back before the end of the day to further discuss.     Action Taken: Other: Cardiology    Travel Screening: Not Applicable    Thank you!  [facility]

## 2023-11-03 ENCOUNTER — OFFICE VISIT (OUTPATIENT)
Dept: CARDIOLOGY | Facility: CLINIC | Age: 68
End: 2023-11-03
Attending: PHYSICIAN ASSISTANT
Payer: MEDICARE

## 2023-11-03 DIAGNOSIS — I25.42 SPONTANEOUS DISSECTION OF CORONARY ARTERY: ICD-10-CM

## 2023-11-03 DIAGNOSIS — I77.3 FIBROMUSCULAR DYSPLASIA OF CAROTID ARTERY (H): ICD-10-CM

## 2023-11-03 PROCEDURE — 96040 HC GENETIC COUNSELING, EACH 30 MINUTES: CPT | Performed by: GENETIC COUNSELOR, MS

## 2023-11-03 NOTE — LETTER
"11/3/2023      RE: Raúl Marie  [address]  [facility] [address]       Dear Colleague,    Thank you for the opportunity to participate in the care of your patient, Raúl Marie, at the [facility] [place] at [facility]. Please see a copy of my visit note below.    Here is a copy of the progress note from your recent genetic counseling visit to the [facility] on Date: 11/3/2023.    PROGRESS NOTE:Raúl was seen for genetic counseling due to his history of spontaneous coronary artery dissection (SCAD).  I had the opportunity to talk with Raúl today to discuss the genetic component of SCAD and testing options available to him.     MEDICAL HISTORY:Raúl was hospitalized at [facility] in January 2023 with chest pain and elevated troponin levels.  Coronary angiography revealed evidence of multivessel coronary dissection. 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    There is recent evidence that SCAD can be associated with underlying connective tissue disorders.  Therefore, we also discussed related symptoms.  Raúl states that he is 5'8\" tall and has a newly diagnosed hernia.  He denies any history of lens dislocation, near sighted, scoliosis, sternum deformities, pneumothorax, organ prolapse, poor wound healing, easy bruising, or scarring, and reports a normal uvula and palate.    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    FAMILY HISTORY:A detailed family history was obtained today and was significant for the following " cardiac history:    Father  at 100 years.  He had heart issues in his 80's, macular degeneration, type 2 diabetes and Alzheimers.  Paternal aunt  90's, no details known about her health history.  Paternal grandfather  at 67 years in surgery.  Grandmother  at 103 with dementia.  Mother  at 99 years with dementia and IBS.  Maternal uncle and two aunts  over 80 years.  One had dementia, other details are unknown.  Maternal grandmother  90's with colon cancer and dementia.  Grandfather  90's but details are unknown.  Maternal half brother (81) has Graves disease.  Maternal half brother (79) is in good health.  Maternal half sister (75) has swelling in her legs and eye issues.  Her daughter has Shahriar syndrome.  Raúl has two step children and no biological children.  There is no additional history of aneurysms, dissections, connective tissue disorders, heart attacks, fainting, sudden cardiac death, genetic conditions, or birth defects. (Scanned pedigree may be under media tab)    DISCUSSION:  Reviewed diagnosis of Spontaneous coronary artery dissection (SCAD),including how it occurs and related symptoms. SCAD most commonly affects women in their 40s and 50s, though it can occur at any age and can occur in men. SCAD can be caused by genetic and non-genetic factors.  Non-genetic factors include fibromuscular dysplasia (FMD). However, people who have SCAD often don't have risk factors for heart disease, such as high blood pressure, high cholesterol or diabetes.      Genetic causes for SCAD can include underlying connective tissue disorders (CTD).  One specific CTD related to increased risk fo SCAD is vascular Winston Danlos Syndrome (vEDS), which is caused by a mutation in the COL3A1 gene.  Other collagen (COL) genes have recently been reported in conjunction with SCAD (COL3A1, COL5A1, COL4A1, COL6A1, COL5A2  [id number], COL4A5, COL1A2, [id number]) - Therefore, we discussed testing for the  Connective Tissue Disorders Panel through [facility] [facility], which includes 7 of the related COL genes (those in bold are not included).      Reviewed EDS and the many different types.  Explained that Vascular EDS (type IV) is a severe form, which has increased risk for aortic aneurysms and dissections.  Classic EDS and hypermobile EDS are usually less severe with hypermobility being a prominent feature.  Classic EDS can have other findings, especially involving the skin.  All of these forms of EDS are inherited in an autosomal dominant (AD) pattern.  Reviewed AD inheritance, including the 50% risk for recurrence.      Reviewed genetic testing for Vascular EDS and the yield of identifying a mutation (about 98%) in the COL3A1 gene. Genetic testing for hypermobile EDS is much lower (10-20%) and detection for classic EDS is somewhere in between.  Detection rate for other connective tissue disorders varies based on disease.      Explained three possible outcomes of genetic testing including: positive identification of a mutation, no mutation identified, and identification of a variant of unknown significance (VUS). If a mutation is identified, presymptomatic testing would be available to at risk family members. If no mutation is identified, it does not rule out the possibility of a genetic component to this disease. Family members could still be at risk for developing the same condition. If a VUS is identified, it is unclear if the mutation is disease causing or just a normal variation. It may take time and possibly additional testing to determine the meaning of a VUS result.      Reviewed capabilities, limitations, and logistics of testing. DNA sample via saliva or blood is collected and sent to testing lab for evaluation of selected genes. Turn around time for results of 2-4 weeks. Reviewed cost of testing thru commercial lab.  Explained that they will work with insurance carrier and notify patient if out of pocket costs exceed  $100.      Discussed pros and cons of genetic testing. Explained that results could significantly impact management and treatment decisions.  Reviewed possible issues associated with presymptomatic testing including genetic discrimination, current laws to prevent discrimination (ie. AMIE), insurance issues, and emotional and psychosocial outcomes of testing.      Explained that clinical evaluation is recommended for all first degree relatives (parents, siblings, and children) of an affected individual regardless of decision to pursue genetic testing.  Due the risk of spontaneous arterial dissection associated with Vascular EDS an early diagnosis can be very beneficial for treatment and management decisions.       All questions answered at this time    PLAN:Raúl reports that he has an appointment at [facility] in the next month. He is enrolled in their SCAD study and has been told that they too may be interested in genetic testing.  Therefore, he plans to pursue testing through [facility].  He will contact me in the future if their genetic testing is only offered on a research basis.    TOTAL TIME SPENT IN COUNSELIN minutes    Pao Thomas MS, [facility]  Licensed, Certified Genetic Counselor  [facility]     Please do not hesitate to contact me if you have any questions/concerns.     Sincerely,     Pao Thomas GC

## 2023-11-06 ENCOUNTER — LAB (OUTPATIENT)
Dept: LAB | Facility: CLINIC | Age: 68
End: 2023-11-06
Payer: MEDICARE

## 2023-11-06 ENCOUNTER — ANCILLARY PROCEDURE (OUTPATIENT)
Dept: MRI IMAGING | Facility: CLINIC | Age: 68
End: 2023-11-06
Attending: UROLOGY
Payer: MEDICARE

## 2023-11-06 DIAGNOSIS — N28.89 LEFT RENAL MASS: ICD-10-CM

## 2023-11-06 LAB
ANION GAP SERPL CALCULATED.3IONS-SCNC: 10 MMOL/L (ref 7–15)
BUN SERPL-MCNC: 21.7 MG/DL (ref 8–23)
CALCIUM SERPL-MCNC: 9.4 MG/DL (ref 8.8–10.2)
CHLORIDE SERPL-SCNC: 100 MMOL/L (ref 98–107)
CREAT SERPL-MCNC: 1.21 MG/DL (ref 0.67–1.17)
DEPRECATED HCO3 PLAS-SCNC: 28 MMOL/L (ref 22–29)
EGFRCR SERPLBLD CKD-EPI 2021: 65 ML/MIN/1.73M2
GLUCOSE SERPL-MCNC: 94 MG/DL (ref 70–99)
POTASSIUM SERPL-SCNC: 4.4 MMOL/L (ref 3.4–5.3)
SODIUM SERPL-SCNC: 138 MMOL/L (ref 135–145)

## 2023-11-06 PROCEDURE — A9585 GADOBUTROL INJECTION: HCPCS | Mod: JZ | Performed by: UROLOGY

## 2023-11-06 PROCEDURE — 80048 BASIC METABOLIC PNL TOTAL CA: CPT

## 2023-11-06 PROCEDURE — 255N000002 HC RX 255 OP 636: Mod: JZ | Performed by: UROLOGY

## 2023-11-06 PROCEDURE — 74183 MRI ABD W/O CNTR FLWD CNTR: CPT | Mod: MG

## 2023-11-06 PROCEDURE — 36415 COLL VENOUS BLD VENIPUNCTURE: CPT

## 2023-11-06 RX ORDER — GADOBUTROL 604.72 MG/ML
8 INJECTION INTRAVENOUS ONCE
Status: COMPLETED | OUTPATIENT
Start: 2023-11-06 | End: 2023-11-06

## 2023-11-06 RX ADMIN — GADOBUTROL 8 ML: 604.72 INJECTION INTRAVENOUS at 15:47

## 2023-11-06 NOTE — PROGRESS NOTES
"Here is a copy of the progress note from your recent genetic counseling visit to the [facility] on Date: 11/3/2023.    PROGRESS NOTE:Raúl was seen for genetic counseling due to his history of spontaneous coronary artery dissection (SCAD).  I had the opportunity to talk with Raúl today to discuss the genetic component of SCAD and testing options available to him.     MEDICAL HISTORY:Raúl was hospitalized at [facility] in 2023 with chest pain and elevated troponin levels.  Coronary angiography revealed evidence of multivessel coronary dissection. 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    There is recent evidence that SCAD can be associated with underlying connective tissue disorders.  Therefore, we also discussed related symptoms.  Raúl states that he is 5'8\" tall and has a newly diagnosed hernia.  He denies any history of lens dislocation, near sighted, scoliosis, sternum deformities, pneumothorax, organ prolapse, poor wound healing, easy bruising, or scarring, and reports a normal uvula and palate.    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    FAMILY HISTORY:A detailed family history was obtained today and was significant for the following cardiac history:    Father  at 100 years.  He had heart issues in his 80's, macular degeneration, type 2 diabetes and Alzheimers.  Paternal aunt  90's, no details known about her health history.  Paternal grandfather  at 67 years in surgery.  Grandmother  at 103 with dementia.  Mother  at 99 years with dementia and IBS.  Maternal " uncle and two aunts  over 80 years.  One had dementia, other details are unknown.  Maternal grandmother  90's with colon cancer and dementia.  Grandfather  90's but details are unknown.  Maternal half brother (81) has Graves disease.  Maternal half brother (79) is in good health.  Maternal half sister (75) has swelling in her legs and eye issues.  Her daughter has Shahriar syndrome.  Raúl has two step children and no biological children.  There is no additional history of aneurysms, dissections, connective tissue disorders, heart attacks, fainting, sudden cardiac death, genetic conditions, or birth defects. (Scanned pedigree may be under media tab)    DISCUSSION:  Reviewed diagnosis of Spontaneous coronary artery dissection (SCAD),including how it occurs and related symptoms. SCAD most commonly affects women in their 40s and 50s, though it can occur at any age and can occur in men. SCAD can be caused by genetic and non-genetic factors.  Non-genetic factors include fibromuscular dysplasia (FMD). However, people who have SCAD often don't have risk factors for heart disease, such as high blood pressure, high cholesterol or diabetes.      Genetic causes for SCAD can include underlying connective tissue disorders (CTD).  One specific CTD related to increased risk fo SCAD is vascular Winston Danlos Syndrome (vEDS), which is caused by a mutation in the COL3A1 gene.  Other collagen (COL) genes have recently been reported in conjunction with SCAD (COL3A1, COL5A1, COL4A1, COL6A1, COL5A2  [id number], COL4A5, COL1A2, [id number]) - Therefore, we discussed testing for the Connective Tissue Disorders Panel through [facility], which includes 7 of the related COL genes (those in bold are not included).      Reviewed EDS and the many different types.  Explained that Vascular EDS (type IV) is a severe form, which has increased risk for aortic aneurysms and dissections.  Classic EDS and hypermobile EDS are usually less severe with  hypermobility being a prominent feature.  Classic EDS can have other findings, especially involving the skin.  All of these forms of EDS are inherited in an autosomal dominant (AD) pattern.  Reviewed AD inheritance, including the 50% risk for recurrence.      Reviewed genetic testing for Vascular EDS and the yield of identifying a mutation (about 98%) in the COL3A1 gene. Genetic testing for hypermobile EDS is much lower (10-20%) and detection for classic EDS is somewhere in between.  Detection rate for other connective tissue disorders varies based on disease.      Explained three possible outcomes of genetic testing including: positive identification of a mutation, no mutation identified, and identification of a variant of unknown significance (VUS). If a mutation is identified, presymptomatic testing would be available to at risk family members. If no mutation is identified, it does not rule out the possibility of a genetic component to this disease. Family members could still be at risk for developing the same condition. If a VUS is identified, it is unclear if the mutation is disease causing or just a normal variation. It may take time and possibly additional testing to determine the meaning of a VUS result.      Reviewed capabilities, limitations, and logistics of testing. DNA sample via saliva or blood is collected and sent to testing lab for evaluation of selected genes. Turn around time for results of 2-4 weeks. Reviewed cost of testing thru commercial lab.  Explained that they will work with insurance carrier and notify patient if out of pocket costs exceed $100.      Discussed pros and cons of genetic testing. Explained that results could significantly impact management and treatment decisions.  Reviewed possible issues associated with presymptomatic testing including genetic discrimination, current laws to prevent discrimination (ie. AMIE), insurance issues, and emotional and psychosocial outcomes of  testing.      Explained that clinical evaluation is recommended for all first degree relatives (parents, siblings, and children) of an affected individual regardless of decision to pursue genetic testing.  Due the risk of spontaneous arterial dissection associated with Vascular EDS an early diagnosis can be very beneficial for treatment and management decisions.       All questions answered at this time    PLAN:Raúl reports that he has an appointment at [facility] in the next month. He is enrolled in their SCAD study and has been told that they too may be interested in genetic testing.  Therefore, he plans to pursue testing through [facility].  He will contact me in the future if their genetic testing is only offered on a research basis.    TOTAL TIME SPENT IN COUNSELIN minutes    Pao Thomas MS, [facility]  Licensed, Certified Genetic Counselor  [facility]

## 2023-11-06 NOTE — PATIENT INSTRUCTIONS
"Indication for Genetic Counseling:     Spontaneous coronary artery dissection (SCAD) occurs when a  tear forms in a blood vessel in the heart. SCAD can slow or block blood flow to the heart, causing a heart attack, heart rhythm problems (arrythmias) or sudden death.  SCAD most commonly affects women in their 40s and 50s, though it can occur at any age and can occur in men. SCAD can be caused by genetic and non-genetic factors.  However, people who have SCAD often don't have risk factors for heart disease, such as high blood pressure, high cholesterol or diabetes.    Genetic causes for SCAD can include underlying connective tissue disorder (CTD).  One specific CTD related to increased risk fo SCAD is vascular Winston Danlos Syndrome (vEDS), which is caused by a mutation in the COL3A1 gene.  Other COL genes have recently been reported in conjunction with SCAD (COL3A1, COL5A1, COL4A1, COL6A1, COL5A2  [id number], COL4A5, COL1A2, [id number]) - Therefore, we discussed testing for the Connective Tissue Disorders Panel through [employer], which includes 7 of the related COL genes (those in bold are not included).       Inheritance:   Humans have over 20,000 genes that instruct our bodies how to function.  We have two copies of each gene because we inherit one from our mother and one from our father.  In most cardiac cases with a genetic component, the condition is inherited in an autosomal dominant (AD) pattern.  This means that in order to have the condition, a person needs to inherit a mutation on one copy of a particular gene.  This mutation or pathogenic variant dominates the \"normal\" working copy of the gene.  When an affected individual has children, they can either pass on the \"normal\" copy of the gene or the mutation.  Therefore, children have a 50% chance of inheriting the mutation.  Other family members also have an increased risk but the specific risk depends on the degree of relationship.  Additional inheritance patterns " can occur within families and may alter the risk of recurrence.     Testing Options:   Genetic testing is available to assess a panel of genes known to cause this condition.  This test reads through the DNA (sequencing) of these genes to look for spelling mistakes or mutations that could cause the condition.      There are three types of results you could receive from this test.     -Positive result (mutation/pathogenic variant identified) - confirms diagnosis and provides an answer to why this happened.  In addition, identifying a mutation allows family members to have testing to determine their risk.     -Negative result (mutation not identified) - no genetic changes were identified.  This does not rule out a genetic cause for the condition as the genetic testing only identifies a portion of genetic causes for this condition.    -Variant of uncertain significance (VUS) - a genetic change was identified, but there is not enough information to determine whether it is disease-causing or normal human genetic variation.     Detection rate for vascular EDS is about 98% with the COL3A1 gene. Genetic testing for hypermobile EDS is much lower (10-20%) and detection for classic EDS is somewhere in between.  Other connective tissue disorder have varying degrees of detection rates.  The detection rate for SCAD is not well known at this time.     Although genetic testing may identify a mutation, it cannot provide information about the severity of symptoms or the progression of disease.  We cannot predict age of onset or severity of symptoms due to reduced penetrance and variable expressivity.    Logistics:   Genetic testing involves collecting a sample of DNA, thru blood, saliva, or cheek cells. The sample will be sent to a laboratory to extract the DNA and sequence the genes for mutations.  The laboratory will work with your insurance company to determine the out of pocket (OOP) cost and will notify you if the OOP cost is  greater than $100.  Remember to ask the lab about financial assistance pricing and self pay options as well.  Sometimes those are much lower than insurance pricing.  When testing is initiated, results take about 2-4 weeks to return. I will contact you over the phone when results are available.     Genetic Information and Nondiscrimination Act:  The Genetic Information and Nondiscrimination Act of 2008 (AMIE) is a federal law that protects individuals from genetic discrimination in health insurance and employment. Genetic discrimination is defined as the misuse of genetic information. This law does not address potential discrimination regarding life insurance or disability insurance.      This is especially relevant for at risk individuals who are considering presymptomatic testing.    Screening Recommendations:  Recommend that first degree relatives, including parents,siblings and children, be screened for aortic aneurysms. If there is an underlying genetic syndrome, clinical evaluation with a medical geneticist may also be recommended.    Resources:  ZAYRA Cardiol. 2022;7(4):396-406. doi:10.1001/jamacardio.2022.0001.     General   American Heart Association - americanheart.org  Genetics Home Reference - ghr.nlm.nih.gov  Genetic Information and Nondiscrimination Act - ginahelp.org    Contact Information:  Pao Thomas MS  Licensed Genetic Counselor  [facility]  [facility]    Office:  [phone]  Appointments:  [phone]  Fax: [phone]  Email: [email or web address]

## 2023-11-09 ENCOUNTER — OFFICE VISIT (OUTPATIENT)
Dept: UROLOGY | Facility: CLINIC | Age: 68
End: 2023-11-09
Payer: MEDICARE

## 2023-11-09 VITALS
SYSTOLIC BLOOD PRESSURE: 110 MMHG | WEIGHT: 152 LBS | DIASTOLIC BLOOD PRESSURE: 70 MMHG | HEIGHT: 68 IN | OXYGEN SATURATION: 98 % | HEART RATE: 71 BPM | BODY MASS INDEX: 23.04 KG/M2

## 2023-11-09 DIAGNOSIS — N28.89 LEFT RENAL MASS: Primary | ICD-10-CM

## 2023-11-09 PROCEDURE — 99213 OFFICE O/P EST LOW 20 MIN: CPT | Performed by: UROLOGY

## 2023-11-09 ASSESSMENT — PAIN SCALES - GENERAL: PAINLEVEL: NO PAIN (0)

## 2023-11-09 NOTE — NURSING NOTE
Chief Complaint   Patient presents with    Renal mass     Follow up MRI and labs    Rylee Guadalupe LPN

## 2023-11-09 NOTE — PROGRESS NOTES
"  CHIEF COMPLAINT   It was my pleasure to see Raúl Marie who is a 68 year old male for follow-up of renal cysts.      HPI  Raúl Marie is a very pleasant 68 year old male who presents with a history of left renal cysts. These were noted incidentally on CTA done in the context of NSTEMI. He presented for this in early January 2022.  The maximal dimension of most suspicious prior lesion was  2.0 centimeters and located on the left side. Largest lesion has involuted and appears to be hemorrhagic cyst.      Creat 1.21    MRI Renal 11/6/2023  IMPRESSION:  1. Previously described three renal nodules again suggested. One of these appears to be a nearly completely resolved hemorrhagic cyst. The other two lesions appear stable in size showing a mild degree of internal enhancement that may be septal. Suggest   follow-up MRI in 6-12 months. Recommend establishment of long-term stability.  2. Stable enlargement of the celiac artery measuring 1.2 cm.     MRI Renal 5/3/2023  IMPRESSION:   1. Three cystic masses with enhancing components in the left kidney.  Most suspicious of these is a 1.2 cm heterogeneously enhancing cystic  mass in the medial interpolar region of the kidney, concerning for  renal neoplasm. Additionally, there is a 1.7 cm and 1.1 cm cysts with  enhancing components in the lateral and inferomedial aspect of the  left kidney which are Bosniak 3 equivalents(intermediate probability  of being malignant).  2. 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  3. 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    PHYSICAL EXAM  Patient is a 68 year old  male   Vitals: Blood pressure 110/70, pulse 71, height 1.727 m (5' 8\"), weight 68.9 kg (152 lb), SpO2 98%.  General Appearance Adult: Body mass index is 23.11 kg/m .  Alert, no acute distress, oriented  Lungs: no respiratory distress, or pursed lip breathing  Abdomen: soft, nontender, no organomegaly or masses  Back: no CVAT  Neuro: Alert, " oriented, speech and mentation normal  Psych: affect and mood normal    Outside and Past Medical records:  Review of the result(s) of each unique test - MRI, creat     ASSESSMENT and PLAN  68 year old male with cystic lesions in the left kidney. Previous largest lesion is now smaller and most consistent with hemorrhagic cyst. The other two lesions in question are cystic with the largest lesion is 1.0 cm. We discussed this in detail today and that the stability of these small lesions is reassuring. Given they are small in size, ongoing surveillance is reasonable. We discussed this in great detail today. Will plan MRI in 12 months.     - Follow-up 12 months with MRI kidney and labs    15 minutes spent on the date of the encounter doing chart review, history and exam, documentation and further activities as noted above.    Jose Elias Cuevas MD  Urology  [facility]

## 2023-11-09 NOTE — Clinical Note
11/9/2023       RE: Raúl Marie  [address]  [facility] [address]     Dear Colleague,    Thank you for referring your patient, Raúl Marie, to the [facility] at [facility]. Please see a copy of my visit note below.      CHIEF COMPLAINT   It was my pleasure to see Raúl Marie who is a 68 year old male for follow-up of ***.      HPI  Raúl Marie is a very pleasant 68 year old male who presents with a history of left renal mass. This was noted incidentally on CTA done in the context of NSTEMI. He presented for this in early January 2022.  The maximal dimension of the renal lesion is 2.0 centimeters and located on the left side.     He also notes some intermittent scrotal/groin discomfort that he thinks is a hernia.     Creat 1.18    MRI Renal 11/6/2023  IMPRESSION:  1. Previously described three renal nodules again suggested. One of these appears to be a nearly completely resolved hemorrhagic cyst. The other two lesions appear stable in size showing a mild degree of internal enhancement that may be septal. Suggest   follow-up MRI in 6-12 months. Recommend establishment of long-term stability.  2. Stable enlargement of the celiac artery measuring 1.2 cm.     MRI Renal 5/3/2023  IMPRESSION:   1. Three cystic masses with enhancing components in the left kidney.  Most suspicious of these is a 1.2 cm heterogeneously enhancing cystic  mass in the medial interpolar region of the kidney, concerning for  renal neoplasm. Additionally, there is a 1.7 cm and 1.1 cm cysts with  enhancing components in the lateral and inferomedial aspect of the  left kidney which are Bosniak 3 equivalents(intermediate probability  of being malignant).  2. 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  3. 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    PHYSICAL EXAM  Patient is a 68 year old  male   Vitals: Blood  "pressure 110/70, pulse 71, height 1.727 m (5' 8\"), weight 68.9 kg (152 lb), SpO2 98%.  General Appearance Adult: Body mass index is 23.11 kg/m .  Alert, no acute distress, oriented  Lungs: no respiratory distress, or pursed lip breathing  Abdomen: soft, nontender, no organomegaly or masses; ***  Back: *** CVAT  Neuro: Alert, oriented, speech and mentation normal  Psych: affect and mood normal  : {TANGELA EXAM MALE GENITAL:105361}    Outside and Past Medical records:  Review of the result(s) of each unique test - MRI, creat     ASSESSMENT and PLAN  68 year old male with cystic lesions in the left kidney. Largest is 1.7 cm. The largest suspicious lesion is 1.2 cm. We discussed this in detail today and that the stability of these small lesions is reassuring. Given they are small in size, ongoing surveillance is reasonable. We discussed this in great detail today. Will plan MRI in 6 months.     - Follow-up 6 months with MRI kidney and labs      *** minutes spent on the date of the encounter doing chart review, history and exam, documentation and further activities as noted above.    Jose Elias Cuevas MD  Urology  [facility]        Again, thank you for allowing me to participate in the care of your patient.      Sincerely,    Jose Elias Cuevas MD    "

## 2023-11-13 ENCOUNTER — MYC MEDICAL ADVICE (OUTPATIENT)
Dept: CARDIOLOGY | Facility: CLINIC | Age: 68
End: 2023-11-13
Payer: MEDICARE

## 2023-11-13 DIAGNOSIS — I21.4 NON-STEMI (NON-ST ELEVATED MYOCARDIAL INFARCTION) (H): ICD-10-CM

## 2023-11-13 DIAGNOSIS — E78.5 HYPERLIPIDEMIA LDL GOAL <70: Primary | ICD-10-CM

## 2023-11-14 NOTE — TELEPHONE ENCOUNTER
Called patient today to discuss his questions regarding shoveling snow in the upcoming winter season.  We had a long, very pleasant conversation about physical activity and exertion in regards to scad.  Again today, we discussed the pathophysiology of scad.  We discussed his current medication regimen.  We discussed red flag signs and symptoms and when to seek emergent care.  Activity suggestions and cardiovascular guidelines reviewed.    ABIODUN Bennett, CNP   Nurse Practitioner  [facility]  Pager: [phone]   Text Page (8am - 5pm, M-F)

## 2023-11-17 RX ORDER — ATORVASTATIN CALCIUM 20 MG/1
30 TABLET, FILM COATED ORAL DAILY
Qty: 135 TABLET | Refills: 3 | Status: SHIPPED | OUTPATIENT
Start: 2023-11-17 | End: 2023-11-27 | Stop reason: ALTCHOICE

## 2023-11-27 DIAGNOSIS — I25.42 SPONTANEOUS DISSECTION OF CORONARY ARTERY: ICD-10-CM

## 2023-11-27 DIAGNOSIS — E78.5 HYPERLIPIDEMIA LDL GOAL <70: ICD-10-CM

## 2023-11-27 DIAGNOSIS — I25.10 CORONARY ARTERY DISEASE INVOLVING NATIVE CORONARY ARTERY OF NATIVE HEART WITHOUT ANGINA PECTORIS: Primary | ICD-10-CM

## 2023-11-27 RX ORDER — ATORVASTATIN CALCIUM 10 MG/1
10 TABLET, FILM COATED ORAL DAILY
Qty: 90 TABLET | Refills: 3 | Status: SHIPPED | OUTPATIENT
Start: 2023-11-27 | End: 2024-08-02

## 2023-11-27 RX ORDER — ATORVASTATIN CALCIUM 20 MG/1
20 TABLET, FILM COATED ORAL DAILY
Qty: 90 TABLET | Refills: 3 | Status: SHIPPED | OUTPATIENT
Start: 2023-11-27

## 2023-12-29 ENCOUNTER — TRANSFERRED RECORDS (OUTPATIENT)
Dept: HEALTH INFORMATION MANAGEMENT | Facility: CLINIC | Age: 68
End: 2023-12-29

## 2024-01-04 ENCOUNTER — MYC MEDICAL ADVICE (OUTPATIENT)
Dept: CARDIOLOGY | Facility: CLINIC | Age: 69
End: 2024-01-04
Payer: MEDICARE

## 2024-01-04 DIAGNOSIS — I21.4 NON-STEMI (NON-ST ELEVATED MYOCARDIAL INFARCTION) (H): ICD-10-CM

## 2024-01-04 DIAGNOSIS — I25.42 SPONTANEOUS DISSECTION OF CORONARY ARTERY: Primary | ICD-10-CM

## 2024-01-04 RX ORDER — NITROGLYCERIN 0.4 MG/1
TABLET SUBLINGUAL
Qty: 28 TABLET | Refills: 1 | Status: SHIPPED | OUTPATIENT
Start: 2024-01-04

## 2024-01-04 NOTE — PROGRESS NOTES
[facility] Refill Guideline reviewed.  Medication meets criteria for refill. JETHRO Flaherty RN   
single

## 2024-01-08 RX ORDER — METOPROLOL SUCCINATE 25 MG/1
25 TABLET, EXTENDED RELEASE ORAL DAILY
Qty: 90 TABLET | Refills: 1 | Status: SHIPPED | OUTPATIENT
Start: 2024-01-08 | End: 2024-07-15

## 2024-03-05 ENCOUNTER — TRANSFERRED RECORDS (OUTPATIENT)
Dept: HEALTH INFORMATION MANAGEMENT | Facility: CLINIC | Age: 69
End: 2024-03-05

## 2024-03-06 ENCOUNTER — MEDICAL CORRESPONDENCE (OUTPATIENT)
Dept: SCHEDULING | Facility: CLINIC | Age: 69
End: 2024-03-06
Payer: MEDICARE

## 2024-03-29 ENCOUNTER — HOSPITAL ENCOUNTER (OUTPATIENT)
Dept: CARDIOLOGY | Facility: CLINIC | Age: 69
Discharge: HOME | End: 2024-03-29
Attending: INTERNAL MEDICINE | Admitting: INTERNAL MEDICINE
Payer: MEDICARE

## 2024-03-29 ENCOUNTER — LAB (OUTPATIENT)
Dept: LAB | Facility: CLINIC | Age: 69
End: 2024-03-29
Attending: INTERNAL MEDICINE
Payer: MEDICARE

## 2024-03-29 DIAGNOSIS — I21.4 NON-STEMI (NON-ST ELEVATED MYOCARDIAL INFARCTION) (H): ICD-10-CM

## 2024-03-29 LAB
ANION GAP SERPL CALCULATED.3IONS-SCNC: 9 MMOL/L (ref 7–15)
BUN SERPL-MCNC: 29.3 MG/DL (ref 8–23)
CALCIUM SERPL-MCNC: 9.2 MG/DL (ref 8.8–10.2)
CHLORIDE SERPL-SCNC: 104 MMOL/L (ref 98–107)
CREAT SERPL-MCNC: 1.2 MG/DL (ref 0.67–1.17)
DEPRECATED HCO3 PLAS-SCNC: 27 MMOL/L (ref 22–29)
EGFRCR SERPLBLD CKD-EPI 2021: 66 ML/MIN/1.73M2
GLUCOSE SERPL-MCNC: 93 MG/DL (ref 70–99)
LVEF ECHO: NORMAL
POTASSIUM SERPL-SCNC: 4.5 MMOL/L (ref 3.4–5.3)
SODIUM SERPL-SCNC: 140 MMOL/L (ref 135–145)

## 2024-03-29 PROCEDURE — 36415 COLL VENOUS BLD VENIPUNCTURE: CPT | Performed by: INTERNAL MEDICINE

## 2024-03-29 PROCEDURE — 93306 TTE W/DOPPLER COMPLETE: CPT

## 2024-03-29 PROCEDURE — 80048 BASIC METABOLIC PNL TOTAL CA: CPT | Performed by: INTERNAL MEDICINE

## 2024-03-29 PROCEDURE — 93306 TTE W/DOPPLER COMPLETE: CPT | Mod: 26 | Performed by: INTERNAL MEDICINE

## 2024-04-01 ENCOUNTER — CARE COORDINATION (OUTPATIENT)
Dept: CARDIOLOGY | Facility: CLINIC | Age: 69
End: 2024-04-01
Payer: MEDICARE

## 2024-04-01 NOTE — PROGRESS NOTES
Echo and lab results from 3/29/24 noted. Pt doesn't have annual follow up with  (for hx of coronary artery disease due to spontaneous coronary artery dissection ) until August (next available opening). Will send an update on echo an lab results to . Karli PERKINS April 1, 2024, 12:30 PM    Procedure  Complete Echo Adult.     ______________________________________________________________________________  Interpretation Summary     Left ventricular systolic function is normal.  The visual ejection fraction is 60-65%.  No regional wall motion abnormalities noted.  There is mild (1+) aortic regurgitation.  Ascending aorta measures 39mm compared to 35mm on prior study from 3/23. The  study was technically adequate.    Component      Latest Ref Rng 3/29/2024  12:25 PM   Sodium      135 - 145 mmol/L 140    Potassium      3.4 - 5.3 mmol/L 4.5    Chloride      98 - 107 mmol/L 104    Carbon Dioxide (CO2)      22 - 29 mmol/L 27    Anion Gap      7 - 15 mmol/L 9    Urea Nitrogen      8.0 - 23.0 mg/dL 29.3 (H)    Creatinine      0.67 - 1.17 mg/dL 1.20 (H)    GFR Estimate      >60 mL/min/1.73m2 66    Calcium      8.8 - 10.2 mg/dL 9.2    Glucose      70 - 99 mg/dL 93       Legend:  (H) High

## 2024-04-19 NOTE — TELEPHONE ENCOUNTER
Echo and labs look stable, no change.  We will discuss further at scheduled office visit in August.  Justo Muniz MD

## 2024-06-09 ENCOUNTER — HEALTH MAINTENANCE LETTER (OUTPATIENT)
Age: 69
End: 2024-06-09

## 2024-07-15 DIAGNOSIS — I25.42 SPONTANEOUS DISSECTION OF CORONARY ARTERY: ICD-10-CM

## 2024-07-15 RX ORDER — METOPROLOL SUCCINATE 25 MG/1
25 TABLET, EXTENDED RELEASE ORAL DAILY
Qty: 90 TABLET | Refills: 0 | Status: SHIPPED | OUTPATIENT
Start: 2024-07-15

## 2024-08-02 ENCOUNTER — OFFICE VISIT (OUTPATIENT)
Dept: CARDIOLOGY | Facility: CLINIC | Age: 69
End: 2024-08-02
Payer: MEDICARE

## 2024-08-02 VITALS
WEIGHT: 151 LBS | DIASTOLIC BLOOD PRESSURE: 65 MMHG | SYSTOLIC BLOOD PRESSURE: 98 MMHG | OXYGEN SATURATION: 97 % | BODY MASS INDEX: 22.88 KG/M2 | HEIGHT: 68 IN | HEART RATE: 68 BPM

## 2024-08-02 DIAGNOSIS — I77.3 FIBROMUSCULAR DYSPLASIA OF CAROTID ARTERY (H): ICD-10-CM

## 2024-08-02 DIAGNOSIS — I21.4 NON-STEMI (NON-ST ELEVATED MYOCARDIAL INFARCTION) (H): ICD-10-CM

## 2024-08-02 DIAGNOSIS — I25.42 SPONTANEOUS DISSECTION OF CORONARY ARTERY: ICD-10-CM

## 2024-08-02 DIAGNOSIS — E78.5 HYPERLIPIDEMIA LDL GOAL <70: Primary | ICD-10-CM

## 2024-08-02 DIAGNOSIS — I25.10 CORONARY ARTERY DISEASE INVOLVING NATIVE CORONARY ARTERY OF NATIVE HEART WITHOUT ANGINA PECTORIS: ICD-10-CM

## 2024-08-02 PROCEDURE — 99214 OFFICE O/P EST MOD 30 MIN: CPT | Performed by: INTERNAL MEDICINE

## 2024-08-02 NOTE — LETTER
8/2/2024    Meenu Gusman MD  [facility] [address]  [facility] [id number]    RE: Raúl Marie       Dear Colleague,     I had the pleasure of seeing Raúl Marie in the [facility].    General Cardiology Clinic Progress Note  Raúl Marie MRN# [id number]   YOB: 1955 Age: 68 year old       Reason for visit: Non-STEMI due to scad    History of presenting illness:    I had the opportunity to see Mr. 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 I met Mr. Marie when he was hospitalized at [facility] on 01/04/2023 with chest pain and elevated troponin levels. His troponin was greater than 1000. He had typical angina as well and I referred him for coronary angiography immediately upon evaluation. This revealed evidence of multivessel coronary dissection involving the third diagonal and a distal obtuse marginal vessel. There was also some disease within the distal right coronary artery and posterolateral branch. His disease was in small distal branch arteries and therefore not amenable to revascularization. He was started on medical therapy including aspirin, Plavix, and isosorbide mononitrate 30 mg daily. 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  Subsequent evaluation has also demonstrated fibromuscular dysplasia in the renal artery.    He was having ongoing chest pain symptoms that were not clearly cardiac in nature by any objective testing.  We increased his isosorbide from 30 to 60 mg a day and those chest pains have gradually resolved.  He has not been having any recent chest pain symptoms.    He did go down to [place] for a full evaluation by cardiology for scad and vascular  surgery for fibromuscular dysplasia.  He also saw a genetic specialist.  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 by CT scan.    He has no other concerning cardiovascular type symptoms at this time.  His blood pressure is 98/65 but he is not experiencing any lightheadedness.  His heart rate is 68 and weight 151 pounds and stable.  His examination is otherwise normal.              Assessment and Plan:     ASSESSMENT:    Mr. Raúl Marie is a 68-year-old gentleman with a history of small nontransmural infarction due to scad and evidence of fibromuscular dysplasia involving the carotid arteries renal artery and possibly vertebral arteries.  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  These are not large enough to require intervention at this time.    He is exercising and feeling well.  We talked about appropriate types of exercise to avoid heavy lifting.  His blood pressure is excellent and I will continue his current medications without change.  He wanted to try to decrease his statin therapy and I agree that he does not require aggressive statin therapy at this time based on his scad or fibromuscular dysplasia diagnoses.  I will reduce his atorvastatin to 20 mg a day and recheck a lipid panel in a couple of months.  His goal is to eventually try going off of that to see what his cholesterol numbers look like.    He will follow-up at [facility] later this year and with us again in 1 year.    Justo Muniz MD           Orders this Visit:  Orders Placed This Encounter   Procedures     Lipid Profile     ALT     Lipid Profile     ALT     Follow-Up with Cardiology FLO     Follow-Up with Cardiologist     No orders of the defined types were placed in this encounter.    Medications Discontinued During This Encounter   Medication Reason     atorvastatin (LIPITOR) 10 MG tablet        Today's clinic visit entailed:    30  "minutes spent by me on the date of the encounter doing chart review, history and exam, documentation and further activities per the note  Provider  Link to MDM Help Grid     The level of medical decision making during this visit was of high complexity.           Review of Systems:     Review of Systems:  Skin:        Eyes:       ENT:       Respiratory:  Positive for CPAP;sleep apnea  Cardiovascular:  Negative    Gastroenterology:      Genitourinary:       Musculoskeletal:       Neurologic:       Psychiatric:       Heme/Lymph/Imm:       Endocrine:  Negative              Physical Exam:     Vitals: BP 98/65   Pulse 68   Ht 1.727 m (5' 8\")   Wt 68.5 kg (151 lb)   SpO2 97%   BMI 22.96 kg/m    Constitutional: Well nourished and in no apparent distress.  Eyes: Pupils equal, round. Sclerae anicteric.   HEENT: Normocephalic, atraumatic.   Neck: Supple. JVD   Respiratory: Breathing non-labored. Lungs clear to auscultation bilaterally. No crackles, wheezes, rhonchi, or rales.  Cardiovascular:  Regular rate and rhythm, normal S1 and S2. No murmur, rub, or gallop.  Skin: Warm, dry. No rashes, cyanosis, or xanthelasma.  Extremities: No edema.  Neurologic: No gross motor deficits. Alert, awake, and oriented to person, place and time.  Psychiatric: Affect appropriate.             Medications:     Current Outpatient Medications   Medication Sig Dispense Refill     aspirin (ASA) 81 MG EC tablet Take 1 tablet (81 mg) by mouth daily 100 tablet 0     atorvastatin (LIPITOR) 20 MG tablet Take 1 tablet (20 mg) by mouth daily 90 tablet 3     isosorbide mononitrate (IMDUR) 60 MG 24 hr tablet Take 1 tablet (60 mg) by mouth daily 90 tablet 3     metoprolol succinate ER (TOPROL XL) 25 MG 24 hr tablet Take 1 tablet (25 mg) by mouth daily 90 tablet 0     nitroGLYcerin (NITROSTAT) 0.4 MG sublingual tablet For chest pain place 1 tablet under the tongue every 5 minutes for 3 doses. If symptoms persist 5 minutes after 1st dose call 911. 28 " tablet 1     psyllium (METAMUCIL/KONSYL) Packet Take 1 packet by mouth daily 30 packet 0     tamsulosin (FLOMAX) 0.4 MG capsule Take 0.4 mg by mouth daily         Family History   Problem Relation Age of Onset     Diabetes Father      Colon Cancer Paternal Grandmother        Social History     Socioeconomic History     Marital status: Single     Spouse name: Not on file     Number of children: Not on file     Years of education: Not on file     Highest education level: Not on file   Occupational History     Not on file   Tobacco Use     Smoking status: Never     Smokeless tobacco: Never   Substance and Sexual Activity     Alcohol use: Yes     Comment: occasional     Drug use: Not Currently     Types: Marijuana     Sexual activity: Not on file   Other Topics Concern     Not on file   Social History Narrative     Not on file     Social Determinants of Health     Financial Resource Strain: Low Risk  (12/4/2023)    Received from [facility]    Overall Financial Resource Strain (CARDIA)      Difficulty of Paying Living Expenses: Not very hard   Food Insecurity: No Food Insecurity (12/4/2023)    Received from [facility]    Hunger Vital Sign      Worried About Running Out of Food in the Last Year: Never true      Ran Out of Food in the Last Year: Never true   Transportation Needs: No Transportation Needs (12/4/2023)    Received from [facility]    PRAPARE - Transportation      Lack of Transportation (Medical): No      Lack of Transportation (Non-Medical): No   Physical Activity: Sufficiently Active (12/4/2023)    Received from [facility]    Exercise Vital Sign      Days of Exercise per Week: 7 days      Minutes of Exercise per Session: 40 min   Stress: Not on File (9/8/2021)    Received from [facility]    Stress      Stress: 0   Social Connections: Unknown (1/1/2022)    Received from [facility], [facility]    Social Connections      Frequency of Communication with Friends and Family: Not on file   Interpersonal Safety: Not on file   Housing Stability: Low Risk  (12/4/2023)    Received from [facility], [facility]    Housing Stability      What is your living situation today?: I have a steady place to live            Past Medical History:     Past Medical History:   Diagnosis Date     BPH (benign prostatic hyperplasia)      Glaucoma      Hemorrhoids, unspecified hemorrhoid type      Hernia, abdominal      IgA nephropathy      Mumps               Past Surgical History:     Past Surgical History:   Procedure Laterality Date     APPENDECTOMY       COLONOSCOPY N/A 10/07/2022    Procedure: COLONOSCOPY, WITH POLYPECTOMY AND BIOPSY;  Surgeon: Sylvie Jj MD;  Location:  GI     CV CORONARY ANGIOGRAM N/A 01/04/2023    Procedure: Coronary Angiogram;  Surgeon: Jose Elias Wahl MD;  Location:  HEART CARDIAC CATH LAB     ESOPHAGOSCOPY, GASTROSCOPY, DUODENOSCOPY (EGD), COMBINED N/A 10/07/2022    Procedure: ESOPHAGOGASTRODUODENOSCOPY, WITH BIOPSY;  Surgeon: Sylvie Jj MD;  Location:  GI              Allergies:   Chlorpheniramine-phenylephrine, Ferrous sulfate, Nuts, and Wheat extract       Data:   All laboratory data reviewed:    Recent Labs   Lab Test 07/07/23  1410 01/04/23  1153 10/05/22  1802   LDL 62 105*  --    HDL 47 47  --    NHDL 71 117  --    CHOL 118 164  --    TRIG 44 61  --    IRON  --   --  8*   FEB  --   --  494*   IRONSAT  --   --  2*   JANNY  --   --  3*       Lab Results   Component Value Date    WBC 7.1 05/01/2023    WBC 8.4 01/11/2011    RBC 4.50 05/01/2023    RBC 5.10 01/11/2011    HGB 14.0 05/01/2023    HGB 15.1 01/11/2011    HCT 41.6 05/01/2023    HCT 44.6 01/11/2011    MCV 92 05/01/2023    MCV 88 01/11/2011    MCH 31.1 05/01/2023    MCH 29.6 01/11/2011    MCHC 33.7 05/01/2023    MCHC 33.9 01/11/2011    RDW 14.0 05/01/2023    RDW 13.9 01/11/2011     05/01/2023      01/11/2011       Lab Results   Component Value Date     03/29/2024     01/11/2011    POTASSIUM 4.5 03/29/2024    POTASSIUM 4.0 01/06/2023    POTASSIUM 4.0 01/11/2011    CHLORIDE 104 03/29/2024    CHLORIDE 101 12/29/2023    CHLORIDE 105 01/11/2011    CO2 27 03/29/2024    CO2 26 01/06/2023    CO2 25 01/11/2011    ANIONGAP 9 03/29/2024    ANIONGAP 8 01/06/2023    ANIONGAP 10 01/11/2011    GLC 93 03/29/2024    GLC 96 01/06/2023    GLC 95 01/11/2011    BUN 29.3 (H) 03/29/2024    BUN 20 01/06/2023    BUN 24 01/11/2011    CR 1.20 (H) 03/29/2024    CR 1.06 01/11/2011    GFRESTIMATED 66 03/29/2024    GFRESTIMATED 73 01/11/2011    GFRESTBLACK 88 01/11/2011    SONA 9.2 03/29/2024    SONA 9.3 01/11/2011      Lab Results   Component Value Date    AST 27 01/04/2023    AST 27 01/11/2011    ALT 43 07/07/2023    ALT 41 01/11/2011       Lab Results   Component Value Date    A1C 4.9 01/04/2023       Lab Results   Component Value Date    INR 1.11 10/13/2022    INR 1.10 10/05/2022         SUMANTH MONTENEGRO MD  [facility]    Thank you for allowing me to participate in the care of your patient.      Sincerely,     SUMANTH MONTENEGRO MD     [facility]  cc:   Dedra Foy, APRN CNP  [address]  [place] [address]

## 2024-08-02 NOTE — PATIENT INSTRUCTIONS
It was a pleasure seeing you today and thank you for allowing me to be a part of your health care team.  Should you have any questions regarding your visit or future needs please feel free to reach out to my care team for assistance.      Thank you, Dr. Justo Muniz        **Nursing: [phone]       **Scheduling: [phone]

## 2024-08-02 NOTE — PROGRESS NOTES
General Cardiology Clinic Progress Note  Raúl Marie MRN# [id number]   YOB: 1955 Age: 68 year old       Reason for visit: Non-STEMI due to scad    History of presenting illness:    I had the opportunity to see Mr. 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 I met Mr. Marie when he was hospitalized at [facility] on 01/04/2023 with chest pain and elevated troponin levels. His troponin was greater than 1000. He had typical angina as well and I referred him for coronary angiography immediately upon evaluation. This revealed evidence of multivessel coronary dissection involving the third diagonal and a distal obtuse marginal vessel. There was also some disease within the distal right coronary artery and posterolateral branch. His disease was in small distal branch arteries and therefore not amenable to revascularization. He was started on medical therapy including aspirin, Plavix, and isosorbide mononitrate 30 mg daily. 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  Subsequent evaluation has also demonstrated fibromuscular dysplasia in the renal artery.    He was having ongoing chest pain symptoms that were not clearly cardiac in nature by any objective testing.  We increased his isosorbide from 30 to 60 mg a day and those chest pains have gradually resolved.  He has not been having any recent chest pain symptoms.    He did go down to [place] for a full evaluation by cardiology for scad and vascular surgery for fibromuscular dysplasia.  He also saw a genetic specialist.  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    He has no other concerning  cardiovascular type symptoms at this time.  His blood pressure is 98/65 but he is not experiencing any lightheadedness.  His heart rate is 68 and weight 151 pounds and stable.  His examination is otherwise normal.              Assessment and Plan:     ASSESSMENT:    Mr. Raúl Marie is a 68-year-old gentleman with a history of small nontransmural infarction due to scad and evidence of fibromuscular dysplasia involving the carotid arteries renal artery and possibly vertebral arteries.  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  These are not large enough to require intervention at this time.    He is exercising and feeling well.  We talked about appropriate types of exercise to avoid heavy lifting.  His blood pressure is excellent and I will continue his current medications without change.  He wanted to try to decrease his statin therapy and I agree that he does not require aggressive statin therapy at this time based on his scad or fibromuscular dysplasia diagnoses.  I will reduce his atorvastatin to 20 mg a day and recheck a lipid panel in a couple of months.  His goal is to eventually try going off of that to see what his cholesterol numbers look like.    He will follow-up at [facility] later this year and with us again in 1 year.    Justo Muniz MD           Orders this Visit:  Orders Placed This Encounter   Procedures    Lipid Profile    ALT    Lipid Profile    ALT    Follow-Up with Cardiology FLO    Follow-Up with Cardiologist     No orders of the defined types were placed in this encounter.    Medications Discontinued During This Encounter   Medication Reason    atorvastatin (LIPITOR) 10 MG tablet        Today's clinic visit entailed:    30 minutes spent by me on the date of the encounter doing chart review, history and exam, documentation and further activities per the note  Provider  Link to [facility] Help Grid     The level of medical decision making during this visit was of  "high complexity.           Review of Systems:     Review of Systems:  Skin:        Eyes:       ENT:       Respiratory:  Positive for CPAP;sleep apnea  Cardiovascular:  Negative    Gastroenterology:      Genitourinary:       Musculoskeletal:       Neurologic:       Psychiatric:       Heme/Lymph/Imm:       Endocrine:  Negative              Physical Exam:     Vitals: BP 98/65   Pulse 68   Ht 1.727 m (5' 8\")   Wt 68.5 kg (151 lb)   SpO2 97%   BMI 22.96 kg/m    Constitutional: Well nourished and in no apparent distress.  Eyes: Pupils equal, round. Sclerae anicteric.   HEENT: Normocephalic, atraumatic.   Neck: Supple. JVD   Respiratory: Breathing non-labored. Lungs clear to auscultation bilaterally. No crackles, wheezes, rhonchi, or rales.  Cardiovascular:  Regular rate and rhythm, normal S1 and S2. No murmur, rub, or gallop.  Skin: Warm, dry. No rashes, cyanosis, or xanthelasma.  Extremities: No edema.  Neurologic: No gross motor deficits. Alert, awake, and oriented to person, place and time.  Psychiatric: Affect appropriate.             Medications:     Current Outpatient Medications   Medication Sig Dispense Refill    aspirin (ASA) 81 MG EC tablet Take 1 tablet (81 mg) by mouth daily 100 tablet 0    atorvastatin (LIPITOR) 20 MG tablet Take 1 tablet (20 mg) by mouth daily 90 tablet 3    isosorbide mononitrate (IMDUR) 60 MG 24 hr tablet Take 1 tablet (60 mg) by mouth daily 90 tablet 3    metoprolol succinate ER (TOPROL XL) 25 MG 24 hr tablet Take 1 tablet (25 mg) by mouth daily 90 tablet 0    nitroGLYcerin (NITROSTAT) 0.4 MG sublingual tablet For chest pain place 1 tablet under the tongue every 5 minutes for 3 doses. If symptoms persist 5 minutes after 1st dose call 911. 28 tablet 1    psyllium (METAMUCIL/KONSYL) Packet Take 1 packet by mouth daily 30 packet 0    tamsulosin (FLOMAX) 0.4 MG capsule Take 0.4 mg by mouth daily         Family History   Problem Relation Age of Onset    Diabetes Father     Colon Cancer " Paternal Grandmother        Social History     Socioeconomic History    Marital status: Single     Spouse name: Not on file    Number of children: Not on file    Years of education: Not on file    Highest education level: Not on file   Occupational History    Not on file   Tobacco Use    Smoking status: Never    Smokeless tobacco: Never   Substance and Sexual Activity    Alcohol use: Yes     Comment: occasional    Drug use: Not Currently     Types: Marijuana    Sexual activity: Not on file   Other Topics Concern    Not on file   Social History Narrative    Not on file     Social Determinants of Health     Financial Resource Strain: Low Risk  (12/4/2023)    Received from [facility]    Overall Financial Resource Strain (CARDIA)     Difficulty of Paying Living Expenses: Not very hard   Food Insecurity: No Food Insecurity (12/4/2023)    Received from [facility]    Hunger Vital Sign     Worried About Running Out of Food in the Last Year: Never true     Ran Out of Food in the Last Year: Never true   Transportation Needs: No Transportation Needs (12/4/2023)    Received from [facility]    PRAPARE - Transportation     Lack of Transportation (Medical): No     Lack of Transportation (Non-Medical): No   Physical Activity: Sufficiently Active (12/4/2023)    Received from [facility]    Exercise Vital Sign     Days of Exercise per Week: 7 days     Minutes of Exercise per Session: 40 min   Stress: Not on File (9/8/2021)    Received from [facility]    Stress     Stress: 0   Social Connections: Unknown (1/1/2022)    Received from [facility]    Social Connections     Frequency of Communication with Friends and Family: Not on file   Interpersonal Safety: Not on file   Housing Stability: Low Risk  (12/4/2023)    Received from [facility]    Housing Stability     What is your living  situation today?: I have a steady place to live            Past Medical History:     Past Medical History:   Diagnosis Date    BPH (benign prostatic hyperplasia)     Glaucoma     Hemorrhoids, unspecified hemorrhoid type     Hernia, abdominal     IgA nephropathy     Mumps               Past Surgical History:     Past Surgical History:   Procedure Laterality Date    APPENDECTOMY      COLONOSCOPY N/A 10/07/2022    Procedure: COLONOSCOPY, WITH POLYPECTOMY AND BIOPSY;  Surgeon: Sylvie Jj MD;  Location:  GI    CV CORONARY ANGIOGRAM N/A 01/04/2023    Procedure: Coronary Angiogram;  Surgeon: Jose Elias Wahl MD;  Location:  HEART CARDIAC CATH LAB    ESOPHAGOSCOPY, GASTROSCOPY, DUODENOSCOPY (EGD), COMBINED N/A 10/07/2022    Procedure: ESOPHAGOGASTRODUODENOSCOPY, WITH BIOPSY;  Surgeon: Sylvie Jj MD;  Location:  GI              Allergies:   Chlorpheniramine-phenylephrine, Ferrous sulfate, Nuts, and Wheat extract       Data:   All laboratory data reviewed:    Recent Labs   Lab Test 07/07/23  1410 01/04/23  1153 10/05/22  1802   LDL 62 105*  --    HDL 47 47  --    NHDL 71 117  --    CHOL 118 164  --    TRIG 44 61  --    IRON  --   --  8*   FEB  --   --  494*   IRONSAT  --   --  2*   JANNY  --   --  3*       Lab Results   Component Value Date    WBC 7.1 05/01/2023    WBC 8.4 01/11/2011    RBC 4.50 05/01/2023    RBC 5.10 01/11/2011    HGB 14.0 05/01/2023    HGB 15.1 01/11/2011    HCT 41.6 05/01/2023    HCT 44.6 01/11/2011    MCV 92 05/01/2023    MCV 88 01/11/2011    MCH 31.1 05/01/2023    MCH 29.6 01/11/2011    MCHC 33.7 05/01/2023    MCHC 33.9 01/11/2011    RDW 14.0 05/01/2023    RDW 13.9 01/11/2011     05/01/2023     01/11/2011       Lab Results   Component Value Date     03/29/2024     01/11/2011    POTASSIUM 4.5 03/29/2024    POTASSIUM 4.0 01/06/2023    POTASSIUM 4.0 01/11/2011    CHLORIDE 104 03/29/2024    CHLORIDE 101 12/29/2023    CHLORIDE 105 01/11/2011    CO2 27  03/29/2024    CO2 26 01/06/2023    CO2 25 01/11/2011    ANIONGAP 9 03/29/2024    ANIONGAP 8 01/06/2023    ANIONGAP 10 01/11/2011    GLC 93 03/29/2024    GLC 96 01/06/2023    GLC 95 01/11/2011    BUN 29.3 (H) 03/29/2024    BUN 20 01/06/2023    BUN 24 01/11/2011    CR 1.20 (H) 03/29/2024    CR 1.06 01/11/2011    GFRESTIMATED 66 03/29/2024    GFRESTIMATED 73 01/11/2011    GFRESTBLACK 88 01/11/2011    SONA 9.2 03/29/2024    SONA 9.3 01/11/2011      Lab Results   Component Value Date    AST 27 01/04/2023    AST 27 01/11/2011    ALT 43 07/07/2023    ALT 41 01/11/2011       Lab Results   Component Value Date    A1C 4.9 01/04/2023       Lab Results   Component Value Date    INR 1.11 10/13/2022    INR 1.10 10/05/2022         SUMANTH MONTENEGRO MD  [facility]

## 2024-10-14 DIAGNOSIS — I25.42 SPONTANEOUS DISSECTION OF CORONARY ARTERY: ICD-10-CM

## 2024-10-14 RX ORDER — METOPROLOL SUCCINATE 25 MG/1
25 TABLET, EXTENDED RELEASE ORAL DAILY
Qty: 90 TABLET | Refills: 4 | Status: SHIPPED | OUTPATIENT
Start: 2024-10-14

## 2024-11-21 DIAGNOSIS — I25.42 SPONTANEOUS DISSECTION OF CORONARY ARTERY: ICD-10-CM

## 2024-11-21 DIAGNOSIS — E78.5 HYPERLIPIDEMIA LDL GOAL <70: ICD-10-CM

## 2024-11-21 DIAGNOSIS — I25.10 CORONARY ARTERY DISEASE INVOLVING NATIVE CORONARY ARTERY OF NATIVE HEART WITHOUT ANGINA PECTORIS: ICD-10-CM

## 2024-11-21 RX ORDER — ATORVASTATIN CALCIUM 20 MG/1
20 TABLET, FILM COATED ORAL DAILY
Qty: 90 TABLET | Refills: 2 | Status: SHIPPED | OUTPATIENT
Start: 2024-11-21

## 2024-12-16 ENCOUNTER — LAB (OUTPATIENT)
Dept: LAB | Facility: CLINIC | Age: 69
End: 2024-12-16
Payer: MEDICARE

## 2024-12-16 ENCOUNTER — ANCILLARY PROCEDURE (OUTPATIENT)
Dept: MRI IMAGING | Facility: CLINIC | Age: 69
End: 2024-12-16
Attending: UROLOGY
Payer: MEDICARE

## 2024-12-16 DIAGNOSIS — E78.5 HYPERLIPIDEMIA LDL GOAL <70: ICD-10-CM

## 2024-12-16 DIAGNOSIS — N28.89 LEFT RENAL MASS: ICD-10-CM

## 2024-12-16 LAB
ALT SERPL W P-5'-P-CCNC: 30 U/L (ref 0–70)
ANION GAP SERPL CALCULATED.3IONS-SCNC: 9 MMOL/L (ref 7–15)
BUN SERPL-MCNC: 23.6 MG/DL (ref 8–23)
CALCIUM SERPL-MCNC: 9.6 MG/DL (ref 8.8–10.4)
CHLORIDE SERPL-SCNC: 103 MMOL/L (ref 98–107)
CHOLEST SERPL-MCNC: 130 MG/DL
CREAT SERPL-MCNC: 1.31 MG/DL (ref 0.67–1.17)
EGFRCR SERPLBLD CKD-EPI 2021: 59 ML/MIN/1.73M2
FASTING STATUS PATIENT QL REPORTED: YES
FASTING STATUS PATIENT QL REPORTED: YES
GLUCOSE SERPL-MCNC: 99 MG/DL (ref 70–99)
HCO3 SERPL-SCNC: 27 MMOL/L (ref 22–29)
HDLC SERPL-MCNC: 56 MG/DL
LDLC SERPL CALC-MCNC: 66 MG/DL
NONHDLC SERPL-MCNC: 74 MG/DL
POTASSIUM SERPL-SCNC: 4.6 MMOL/L (ref 3.4–5.3)
SODIUM SERPL-SCNC: 139 MMOL/L (ref 135–145)
TRIGL SERPL-MCNC: 40 MG/DL

## 2024-12-16 PROCEDURE — 36415 COLL VENOUS BLD VENIPUNCTURE: CPT

## 2024-12-16 PROCEDURE — A9585 GADOBUTROL INJECTION: HCPCS | Performed by: UROLOGY

## 2024-12-16 PROCEDURE — 84460 ALANINE AMINO (ALT) (SGPT): CPT

## 2024-12-16 PROCEDURE — 80061 LIPID PANEL: CPT

## 2024-12-16 PROCEDURE — G1010 CDSM STANSON: HCPCS

## 2024-12-16 PROCEDURE — 255N000002 HC RX 255 OP 636: Performed by: UROLOGY

## 2024-12-16 PROCEDURE — 74183 MRI ABD W/O CNTR FLWD CNTR: CPT | Mod: MG

## 2024-12-16 PROCEDURE — 80048 BASIC METABOLIC PNL TOTAL CA: CPT

## 2024-12-16 RX ORDER — GADOBUTROL 604.72 MG/ML
7 INJECTION INTRAVENOUS ONCE
Status: COMPLETED | OUTPATIENT
Start: 2024-12-16 | End: 2024-12-16

## 2024-12-16 RX ADMIN — GADOBUTROL 7 ML: 604.72 INJECTION INTRAVENOUS at 10:55

## 2024-12-26 ENCOUNTER — OFFICE VISIT (OUTPATIENT)
Dept: UROLOGY | Facility: CLINIC | Age: 69
End: 2024-12-26
Payer: MEDICARE

## 2024-12-26 VITALS
HEART RATE: 55 BPM | SYSTOLIC BLOOD PRESSURE: 126 MMHG | HEIGHT: 67 IN | BODY MASS INDEX: 23.07 KG/M2 | DIASTOLIC BLOOD PRESSURE: 75 MMHG | OXYGEN SATURATION: 99 % | WEIGHT: 147 LBS

## 2024-12-26 DIAGNOSIS — N28.89 LEFT RENAL MASS: Primary | ICD-10-CM

## 2024-12-26 RX ORDER — CYCLOBENZAPRINE HCL 10 MG
10 TABLET ORAL
COMMUNITY

## 2024-12-26 RX ORDER — CEPHRADINE 500 MG
0.5 CAPSULE ORAL DAILY
COMMUNITY

## 2024-12-26 RX ORDER — PREDNISOLONE ACETATE 10 MG/ML
SUSPENSION/ DROPS OPHTHALMIC
COMMUNITY
Start: 2024-11-24

## 2024-12-26 RX ORDER — SODIUM CHLORIDE/ALOE VERA
1 GEL (GRAM) NASAL
COMMUNITY

## 2024-12-26 RX ORDER — IBUPROFEN 600 MG/1
TABLET ORAL
COMMUNITY
Start: 2024-01-22

## 2024-12-26 RX ORDER — CLOPIDOGREL BISULFATE 75 MG/1
TABLET ORAL
COMMUNITY

## 2024-12-26 RX ORDER — NYSTATIN 100000 U/G
CREAM TOPICAL
COMMUNITY
Start: 2024-10-24

## 2024-12-26 RX ORDER — LATANOPROST 50 UG/ML
1 SOLUTION/ DROPS OPHTHALMIC AT BEDTIME
COMMUNITY

## 2024-12-26 RX ORDER — OFLOXACIN 3 MG/ML
SOLUTION/ DROPS OPHTHALMIC
COMMUNITY

## 2024-12-26 RX ORDER — KETOROLAC TROMETHAMINE 5 MG/ML
SOLUTION OPHTHALMIC
COMMUNITY
Start: 2024-11-21

## 2024-12-26 RX ORDER — TIMOLOL HEMIHYDRATE 5 MG/ML
1 SOLUTION/ DROPS OPHTHALMIC
COMMUNITY

## 2024-12-26 RX ORDER — AMOXICILLIN 250 MG
1-2 CAPSULE ORAL DAILY
COMMUNITY
Start: 2023-01-07

## 2024-12-26 RX ORDER — METHOCARBAMOL 500 MG/1
TABLET, FILM COATED ORAL
COMMUNITY
Start: 2024-01-22

## 2024-12-26 ASSESSMENT — PAIN SCALES - GENERAL: PAINLEVEL_OUTOF10: NO PAIN (0)

## 2024-12-26 NOTE — NURSING NOTE
Chief Complaint   Patient presents with    hx renal mass     12- MR RENALS ,and labs too on 12-    Talk about the results of the labs and MR OF THE RENALS  Doing well   John Giraldo, CMA

## 2024-12-26 NOTE — Clinical Note
12/26/2024       RE: Raúl Marie  [address]  [place] [address]     Dear Colleague,    Thank you for referring your patient, Raúl Marie, to the [facility] at [facility]. Please see a copy of my visit note below.      CHIEF COMPLAINT   It was my pleasure to see Raúl Marie who is a 69 year old male for follow-up of ***.      HPI  Raúl Marie is a very pleasant 69 year old male who presents with a history of left renal cysts. These were noted incidentally on CTA done in the context of NSTEMI. He presented for this in early January 2022.  The maximal dimension of most suspicious prior lesion was  2.0 centimeters and located on the left side. Largest lesion has involuted and appears to be hemorrhagic cyst.      Creat 1.31     MRI Renal 12/16/2024  IMPRESSION:  1.  The 4-5 mm indeterminate lesion in the interpolar left kidney remains nearly inconspicuous without convincing postcontrast enhancement, again favored to represent a cyst that has decreased since 5/2023.  2.  Stable 10 mm hemorrhagic/proteinaceous cyst in the medial interpolar left kidney. The nearby lesion which was previously described to be suspicious for a cystic renal neoplasm remains fairly inconspicuous and does not have a distinct hyperintense T2   or T1 correlate or suspicious findings on today's exam. This lesion remains indeterminate and could still be a complex cyst. This lesion might be better characterized at renal CT which can be performed in 6-12 months.  3.  Stable mildly complex cyst (Bosniak II) in the posterior interpolar left kidney which should be benign.  4.  Other bilateral simple renal cysts (Bosniak I) warrant no specific follow-up.  5.  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     MRI Renal 5/3/2023  IMPRESSION:   1. Three cystic masses with enhancing components in the left kidney.  Most suspicious of  "these is a 1.2 cm heterogeneously enhancing cystic  mass in the medial interpolar region of the kidney, concerning for  renal neoplasm. Additionally, there is a 1.7 cm and 1.1 cm cysts with  enhancing components in the lateral and inferomedial aspect of the  left kidney which are Bosniak 3 equivalents(intermediate probability  of being malignant).  2. 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  3. 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    PHYSICAL EXAM  Patient is a 69 year old  male   Vitals: Blood pressure 126/75, pulse 55, height 1.702 m (5' 7\"), weight 66.7 kg (147 lb), SpO2 99%.  General Appearance Adult: Body mass index is 23.02 kg/m .  Alert, no acute distress, oriented  Lungs: no respiratory distress, or pursed lip breathing  Abdomen: soft, nontender, no organomegaly or masses; ***  Back: *** CVAT  Neuro: Alert, oriented, speech and mentation normal  Psych: affect and mood normal  : {TANGELA EXAM MALE GENITAL:963297}    Outside and Past Medical records:  Review of the result(s) of each unique test - MRI, creat     ASSESSMENT and PLAN  69 year old male with cystic lesions in the left kidney. Previous largest lesion is now smaller and most consistent with hemorrhagic cyst. The other two lesions in question are cystic with the largest lesion is 1.0 cm. We discussed this in detail today and that the stability of these small lesions is reassuring. Given they are small in size, ongoing surveillance is reasonable. We discussed this in great detail today. Will plan MRI in 12 months.     - Follow-up 12 months with MRI kidney and labs      *** minutes spent on the date of the encounter doing chart review, history and exam, documentation and further activities as noted above.    Jose Elias Cuevas MD  Urology  [facility]        CHIEF COMPLAINT   It was my pleasure to see Raúl Marie who is a 69 year old male for follow-up of left renal lesion.      HPI  Raúl LAMBERT " "Frank is a very pleasant 69 year old male who presents with a history of left renal cysts. These were noted incidentally on CTA done in the context of NSTEMI. He presented for this in early January 2022.  The maximal dimension of most suspicious prior lesion was  2.0 centimeters and located on the left side. Largest lesion has involuted and appears to be hemorrhagic cyst.      Creat 1.31     MRI Renal 12/16/2024  IMPRESSION:  1.  The 4-5 mm indeterminate lesion in the interpolar left kidney remains nearly inconspicuous without convincing postcontrast enhancement, again favored to represent a cyst that has decreased since 5/2023.  2.  Stable 10 mm hemorrhagic/proteinaceous cyst in the medial interpolar left kidney. The nearby lesion which was previously described to be suspicious for a cystic renal neoplasm remains fairly inconspicuous and does not have a distinct hyperintense T2   or T1 correlate or suspicious findings on today's exam. This lesion remains indeterminate and could still be a complex cyst. This lesion might be better characterized at renal CT which can be performed in 6-12 months.  3.  Stable mildly complex cyst (Bosniak II) in the posterior interpolar left kidney which should be benign.  4.  Other bilateral simple renal cysts (Bosniak I) warrant no specific follow-up.  5.  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     PHYSICAL EXAM  Patient is a 69 year old  male   Vitals: Blood pressure 126/75, pulse 55, height 1.702 m (5' 7\"), weight 66.7 kg (147 lb), SpO2 99%.  General Appearance Adult: Body mass index is 23.02 kg/m .  Alert, no acute distress, oriented  Lungs: no respiratory distress, or pursed lip breathing  Abdomen: soft, nontender, no organomegaly or masses  Back: no CVAT  Neuro: Alert, oriented, speech and mentation normal  Psych: affect and mood normal    Outside and Past Medical records:  Review of the result(s) of each unique test - MRI, creat     ASSESSMENT and PLAN  69 " year old male with cystic lesions in the left kidney. Previous largest lesion is now smaller and most consistent with hemorrhagic cyst. The other two lesions in question are cystic with the largest lesion is 1.0 cm. We discussed this in detail today and that the stability of these small lesions is reassuring. Given they are small in size, ongoing surveillance is reasonable. We discussed this in great detail today. Will plan MRI in 12 months.     - Follow-up 12 months with MRI kidney and labs    10 minutes spent on the date of the encounter doing chart review, history and exam, documentation and further activities as noted above.    Jose Elias Cuevas MD  Urology  [facility]        Again, thank you for allowing me to participate in the care of your patient.      Sincerely,    Jose Elias Cuevas MD

## 2024-12-26 NOTE — PROGRESS NOTES
"  CHIEF COMPLAINT   It was my pleasure to see Raúl Marie who is a 69 year old male for follow-up of left renal lesion.      HPI  Raúl Marie is a very pleasant 69 year old male who presents with a history of left renal cysts. These were noted incidentally on CTA done in the context of NSTEMI. He presented for this in early January 2022.  The maximal dimension of most suspicious prior lesion was  2.0 centimeters and located on the left side. Largest lesion has involuted and appears to be hemorrhagic cyst.      Creat 1.31     MRI Renal 12/16/2024  IMPRESSION:  1.  The 4-5 mm indeterminate lesion in the interpolar left kidney remains nearly inconspicuous without convincing postcontrast enhancement, again favored to represent a cyst that has decreased since 5/2023.  2.  Stable 10 mm hemorrhagic/proteinaceous cyst in the medial interpolar left kidney. The nearby lesion which was previously described to be suspicious for a cystic renal neoplasm remains fairly inconspicuous and does not have a distinct hyperintense T2   or T1 correlate or suspicious findings on today's exam. This lesion remains indeterminate and could still be a complex cyst. This lesion might be better characterized at renal CT which can be performed in 6-12 months.  3.  Stable mildly complex cyst (Bosniak II) in the posterior interpolar left kidney which should be benign.  4.  Other bilateral simple renal cysts (Bosniak I) warrant no specific follow-up.  5.  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     PHYSICAL EXAM  Patient is a 69 year old  male   Vitals: Blood pressure 126/75, pulse 55, height 1.702 m (5' 7\"), weight 66.7 kg (147 lb), SpO2 99%.  General Appearance Adult: Body mass index is 23.02 kg/m .  Alert, no acute distress, oriented  Lungs: no respiratory distress, or pursed lip breathing  Abdomen: soft, nontender, no organomegaly or masses  Back: no CVAT  Neuro: Alert, oriented, speech and mentation normal  Psych: " affect and mood normal    Outside and Past Medical records:  Review of the result(s) of each unique test - MRI, creat     ASSESSMENT and PLAN  69 year old male with cystic lesions in the left kidney. Previous largest lesion is now smaller and most consistent with hemorrhagic cyst. The other two lesions in question are cystic with the largest lesion is 1.0 cm. We discussed this in detail today and that the stability of these small lesions is reassuring. Given they are small in size, ongoing surveillance is reasonable. We discussed this in great detail today. Will plan MRI in 12 months.     - Follow-up 12 months with MRI kidney and labs    10 minutes spent on the date of the encounter doing chart review, history and exam, documentation and further activities as noted above.    Jose Elias Cuevas MD  Urology  [facility]

## 2025-03-23 ENCOUNTER — HEALTH MAINTENANCE LETTER (OUTPATIENT)
Age: 70
End: 2025-03-23

## 2025-03-26 ENCOUNTER — TRANSFERRED RECORDS (OUTPATIENT)
Dept: HEALTH INFORMATION MANAGEMENT | Facility: CLINIC | Age: 70
End: 2025-03-26
Payer: MEDICARE

## 2025-04-21 ENCOUNTER — CARE COORDINATION (OUTPATIENT)
Dept: CARDIOLOGY | Facility: CLINIC | Age: 70
End: 2025-04-21

## 2025-04-21 DIAGNOSIS — I51.9 LEFT VENTRICULAR DYSFUNCTION: ICD-10-CM

## 2025-04-21 DIAGNOSIS — I25.9 CHEST PAIN DUE TO MYOCARDIAL ISCHEMIA: ICD-10-CM

## 2025-04-21 DIAGNOSIS — R07.9 CHEST PAIN: Primary | ICD-10-CM

## 2025-04-21 NOTE — PROGRESS NOTES
Routed pt's Bridestory messages to Dr. Muniz for review. Martha Sun, RN on 4/21/2025 at 4:43 PM      iew All Conversations on this Encounter  Raúl Martinez [facility] Heart Team 7 (supporting Justo Muniz MD)4 hours ago (12:08 PM)     Final message.     This was flagged by Dr. Valentine at [facility] (and mentioned in her notes). Once this winter and then a couple weeks ago, when I have exerted myself just a bit harder than usual in exercising, I have had mild chest pain for a couple days afterward and then it has gone away. A couple years ago, in rehab after my heart attack, something similar happened though more extreme both the increase in exertion and the pain afterward and I came in to be tested and checked out okay. I am a little concerned though in relation to the past few months about this aftereffect of pain for a couple days. Is this something that you think should be followed up on?     Thanks.       Raúl Martinez [facility] Heart Team 7 (supporting Justo Muniz MD)4 hours ago (12:06 PM)     (Dr. Scales, Vascular:)     Hello!  With respect to Fibromuscular Dysplasia, these arteries are weaker arteries than normal arteries.  There is a theoretical concern that, because of the weakness, these arteries may be more prone to cholesterol deposits.  We do not know for sure.  Many patients are very concerned about their arteries affected by Fibromuscular Dysplasia, and want to do everything possible to try to prevent dissections and aneurysms, which cholesterol deposits can trigger in normal arteries in patients who do not have Fibromuscular Dysplasia.  Therefore, it would be reasonable to start a statin drug, such as Lipitor, to try to drive your cholesterol levels lower than what they normally would be.  A target total cholesterol would be well under 200 milligrams/deciliter with a target LDL cholesterol of under 100 milligrams/deciliter given the above considerations.  I do not start statin drugs  or monitor patients on them as that is not part of my practice.  This would be a discussion that you would have with your primary care provider.  Best regards.  Gal Moore MD.     One other concern I wanted to relay to you which I will send in one more additional message.        Raúl Martinez [facility] Heart Team 7 (supporting Justo Muniz MD)4 hours ago (11:57 AM)       Dr. Muniz,     This is in follow up to the experiment we have been doing to see if my cholesterol levels, off the statin, would be low enough to allow me to stay off a statin.     I came in today to [place] to do a lab but they told me that the lab I had done which measured my cholesterol level on 3/24 at [place] (I was there to see their SCAD specialist and vascular MD for my FMD) was sufficient.      I believe that this is a comparison of the results of the March test and the preceding one in December, but you should check them since I may be reading them incorrectly:     Dec: overall 130, HDL56, LDL 66   March 2025: overall 191, HDL: 55, LDL:121      Also, sorry to say, I was a little more lax on keeping strictly lean the past several months, though I still mostly avoided high fat cuts of meat.     I wanted to let you know what Dr. Valentine, the SCAD specialist, and Dr. Ordoñez, the FMD Specialist said about the cholesterol. Dr. Valentine thought my numbers looked good and that in her view the statin was a benefit but not ultimately necessary and that it was up to me to continue with it. Her notes have been sent to [place] and should be in the system. Dr. Ordoñez, the vascular MD, has also had his notes sent to [facility]. They are not as specific. I will follow this message with a copy of his reply to me about the statins in a message I just received from him

## 2025-04-24 NOTE — TELEPHONE ENCOUNTER
I am not sure what is causing the prolonged chest discomfort after exercise, although this is somewhat atypical for coronary artery disease.  I would recommend that we do further evaluation with a stress MRI which would allow us to understand whether there are still significant artery blockages, areas of myocardial infarction, and reassess left ventricular function to provide more comprehensive assessment of the extent of coronary artery disease at this point.  Justo Muniz MD

## 2025-04-24 NOTE — TELEPHONE ENCOUNTER
I have reviewed the cholesterol numbers that were performed in March after discontinuing the statin therapy.  The LDL increased from 66 up to 121 mg/dL, which is higher than we would prefer in a patient with history of vascular disease.  Fibromuscular dysplasia and SCAD are different from the usual type of atherosclerotic vascular disease that we deal with.  Some of the early literature suggested that statin therapy was not helpful in patients with SCAD, but that literature was comprised of just a small number of patients and was not very conclusive.  I agree with your vascular specialist and Dr. Valentine, that statin therapy is probably helpful and not harmful.  I would recommend restarting it.  Justo Muniz MD

## 2025-04-25 NOTE — PROGRESS NOTES
Pt notified of Dr. Muniz's thoughts/recommendations in other mychart encounter. Awaiting pt response on resuming statin and agreement w/plan to do stress cardiac MRI. Martha Sun RN on 4/25/2025 at 11:48 AM

## 2025-04-29 NOTE — PROGRESS NOTES
Routed pt's additional questions to Dr. Muniz -- in particular to address question on alternative statin Rx vs pt wants to reconsider dietary modifications.  Martha Sun RN on 4/29/2025 at 1:01 PM      Yes, I would like to schedule the mri stress test.     And I can see how resuming the atorstatin makes sense so I will do that. I have a few further questions about that, though:     1. Is it important to eat lean instead of higher fat meats while on a statin?     2. Dr. Moore (vascular MD) mentioned to me that he believes there are alternatives to atorvastin that could lower the strain on my liver: Crestor which can be taken at a lower dose than Atorvastin and even a non-statin that has the effect of a statin (don't recall the name). I will follow Dr. Muniz's recommendations but curious about these alternatives.     3. I was lax, unfortunately, in staying on a strict lean meat diet the past four months. Would it be possible to retest without the statin again in, say six months (or whenever), with me being more rigorous in eating lean?

## 2025-05-09 ENCOUNTER — ANCILLARY PROCEDURE (OUTPATIENT)
Dept: CT IMAGING | Facility: CLINIC | Age: 70
End: 2025-05-09
Attending: FAMILY MEDICINE
Payer: MEDICARE

## 2025-05-09 DIAGNOSIS — R31.29 HEMATURIA, MICROSCOPIC: ICD-10-CM

## 2025-05-09 PROCEDURE — 74178 CT ABD&PLV WO CNTR FLWD CNTR: CPT

## 2025-05-09 PROCEDURE — 250N000011 HC RX IP 250 OP 636

## 2025-05-09 PROCEDURE — 250N000009 HC RX 250

## 2025-05-09 RX ORDER — IOPAMIDOL 755 MG/ML
100 INJECTION, SOLUTION INTRAVASCULAR ONCE
Status: COMPLETED | OUTPATIENT
Start: 2025-05-09 | End: 2025-05-09

## 2025-05-09 RX ADMIN — SODIUM CHLORIDE 120 ML: 9 INJECTION, SOLUTION INTRAVENOUS at 12:22

## 2025-05-09 RX ADMIN — IOPAMIDOL 100 ML: 755 INJECTION, SOLUTION INTRAVENOUS at 12:22

## 2025-05-12 ENCOUNTER — RESULTS FOLLOW-UP (OUTPATIENT)
Dept: URGENT CARE | Facility: URGENT CARE | Age: 70
End: 2025-05-12

## 2025-06-09 ENCOUNTER — LAB (OUTPATIENT)
Dept: LAB | Facility: CLINIC | Age: 70
End: 2025-06-09
Payer: MEDICARE

## 2025-06-09 ENCOUNTER — HOSPITAL ENCOUNTER (OUTPATIENT)
Dept: CARDIOLOGY | Facility: CLINIC | Age: 70
Discharge: HOME | End: 2025-06-09
Attending: INTERNAL MEDICINE | Admitting: INTERNAL MEDICINE
Payer: MEDICARE

## 2025-06-09 VITALS — HEART RATE: 86 BPM | DIASTOLIC BLOOD PRESSURE: 63 MMHG | SYSTOLIC BLOOD PRESSURE: 109 MMHG

## 2025-06-09 DIAGNOSIS — I51.9 LEFT VENTRICULAR DYSFUNCTION: ICD-10-CM

## 2025-06-09 DIAGNOSIS — E78.5 HYPERLIPIDEMIA LDL GOAL <70: ICD-10-CM

## 2025-06-09 DIAGNOSIS — I25.9 CHEST PAIN DUE TO MYOCARDIAL ISCHEMIA: ICD-10-CM

## 2025-06-09 LAB
ALT SERPL W P-5'-P-CCNC: 25 U/L (ref 0–70)
CHOLEST SERPL-MCNC: 128 MG/DL
FASTING STATUS PATIENT QL REPORTED: YES
HDLC SERPL-MCNC: 51 MG/DL
LDLC SERPL CALC-MCNC: 63 MG/DL
NONHDLC SERPL-MCNC: 77 MG/DL
TRIGL SERPL-MCNC: 69 MG/DL

## 2025-06-09 PROCEDURE — 93017 CV STRESS TEST TRACING ONLY: CPT

## 2025-06-09 PROCEDURE — A9585 GADOBUTROL INJECTION: HCPCS | Performed by: INTERNAL MEDICINE

## 2025-06-09 PROCEDURE — 255N000002 HC RX 255 OP 636: Performed by: INTERNAL MEDICINE

## 2025-06-09 PROCEDURE — 250N000011 HC RX IP 250 OP 636: Mod: JZ | Performed by: INTERNAL MEDICINE

## 2025-06-09 RX ORDER — LORAZEPAM 0.5 MG/1
0.5 TABLET ORAL EVERY 30 MIN PRN
Status: DISCONTINUED | OUTPATIENT
Start: 2025-06-09 | End: 2025-06-10 | Stop reason: HOSPADM

## 2025-06-09 RX ORDER — DIAZEPAM 5 MG/1
5 TABLET ORAL EVERY 30 MIN PRN
Status: DISCONTINUED | OUTPATIENT
Start: 2025-06-09 | End: 2025-06-10 | Stop reason: HOSPADM

## 2025-06-09 RX ORDER — SODIUM CHLORIDE 9 MG/ML
INJECTION, SOLUTION INTRAVENOUS CONTINUOUS PRN
Status: ACTIVE | OUTPATIENT
Start: 2025-06-09 | End: 2025-06-09

## 2025-06-09 RX ORDER — GADOBUTROL 604.72 MG/ML
20 INJECTION INTRAVENOUS ONCE
Status: COMPLETED | OUTPATIENT
Start: 2025-06-09 | End: 2025-06-09

## 2025-06-09 RX ORDER — CAFFEINE 200 MG
200 TABLET ORAL
Status: ACTIVE | OUTPATIENT
Start: 2025-06-09 | End: 2025-06-09

## 2025-06-09 RX ORDER — NITROGLYCERIN 0.4 MG/1
0.4 TABLET SUBLINGUAL EVERY 5 MIN PRN
Status: ACTIVE | OUTPATIENT
Start: 2025-06-09 | End: 2025-06-09

## 2025-06-09 RX ORDER — CAFFEINE CITRATE 20 MG/ML
60 SOLUTION INTRAVENOUS
Status: ACTIVE | OUTPATIENT
Start: 2025-06-09 | End: 2025-06-09

## 2025-06-09 RX ORDER — ALBUTEROL SULFATE 90 UG/1
2 INHALANT RESPIRATORY (INHALATION) EVERY 5 MIN PRN
Status: DISCONTINUED | OUTPATIENT
Start: 2025-06-09 | End: 2025-06-10 | Stop reason: HOSPADM

## 2025-06-09 RX ORDER — REGADENOSON 0.08 MG/ML
0.4 INJECTION, SOLUTION INTRAVENOUS ONCE
Status: COMPLETED | OUTPATIENT
Start: 2025-06-09 | End: 2025-06-09

## 2025-06-09 RX ORDER — AMINOPHYLLINE 25 MG/ML
50-100 INJECTION, SOLUTION INTRAVENOUS
Status: DISCONTINUED | OUTPATIENT
Start: 2025-06-09 | End: 2025-06-10 | Stop reason: HOSPADM

## 2025-06-09 RX ORDER — LIDOCAINE 40 MG/G
CREAM TOPICAL
Status: DISCONTINUED | OUTPATIENT
Start: 2025-06-09 | End: 2025-06-10 | Stop reason: HOSPADM

## 2025-06-09 RX ADMIN — REGADENOSON 0.4 MG: 0.08 INJECTION, SOLUTION INTRAVENOUS at 12:18

## 2025-06-09 RX ADMIN — GADOBUTROL 20 ML: 604.72 INJECTION INTRAVENOUS at 12:48

## 2025-06-10 ENCOUNTER — RESULTS FOLLOW-UP (OUTPATIENT)
Dept: CARDIOLOGY | Facility: CLINIC | Age: 70
End: 2025-06-10

## 2025-06-15 ENCOUNTER — HEALTH MAINTENANCE LETTER (OUTPATIENT)
Age: 70
End: 2025-06-15

## 2025-07-21 ENCOUNTER — OFFICE VISIT (OUTPATIENT)
Dept: CARDIOLOGY | Facility: CLINIC | Age: 70
End: 2025-07-21
Attending: INTERNAL MEDICINE
Payer: MEDICARE

## 2025-07-21 VITALS
BODY MASS INDEX: 24.22 KG/M2 | HEIGHT: 67 IN | HEART RATE: 60 BPM | SYSTOLIC BLOOD PRESSURE: 112 MMHG | DIASTOLIC BLOOD PRESSURE: 72 MMHG | WEIGHT: 154.3 LBS

## 2025-07-21 DIAGNOSIS — I25.42 SPONTANEOUS DISSECTION OF CORONARY ARTERY: Primary | ICD-10-CM

## 2025-07-21 DIAGNOSIS — E78.5 HYPERLIPIDEMIA LDL GOAL <70: ICD-10-CM

## 2025-07-21 DIAGNOSIS — I77.3 FIBROMUSCULAR DYSPLASIA OF CAROTID ARTERY: ICD-10-CM

## 2025-07-21 DIAGNOSIS — I10 BENIGN ESSENTIAL HYPERTENSION: ICD-10-CM

## 2025-07-21 PROCEDURE — 99214 OFFICE O/P EST MOD 30 MIN: CPT | Performed by: NURSE PRACTITIONER

## 2025-07-21 PROCEDURE — 3074F SYST BP LT 130 MM HG: CPT | Performed by: NURSE PRACTITIONER

## 2025-07-21 PROCEDURE — 3078F DIAST BP <80 MM HG: CPT | Performed by: NURSE PRACTITIONER

## 2025-07-21 RX ORDER — ATORVASTATIN CALCIUM 20 MG/1
10 TABLET, FILM COATED ORAL DAILY
Qty: 90 TABLET | Refills: 3 | Status: SHIPPED | OUTPATIENT
Start: 2025-07-21

## 2025-07-21 RX ORDER — ISOSORBIDE MONONITRATE 60 MG/1
60 TABLET, EXTENDED RELEASE ORAL DAILY
Qty: 90 TABLET | Refills: 3 | Status: SHIPPED | OUTPATIENT
Start: 2025-07-21

## 2025-07-21 RX ORDER — METOPROLOL SUCCINATE 25 MG/1
25 TABLET, EXTENDED RELEASE ORAL DAILY
Qty: 90 TABLET | Refills: 4 | Status: SHIPPED | OUTPATIENT
Start: 2025-07-21

## 2025-07-21 NOTE — LETTER
7/21/2025    Meenu Gusman MD  [facility] [address]  [facility] [address]    RE: Raúl Marie       Dear Colleague,     I had the pleasure of seeing Raúl Marie in the [facility].  Cardiology Clinic Progress Note  Raúl Marie MRN# [id number]   YOB: 1955 Age: 69 year old     Primary cardiologist: Dr. Munzi    History of presenting illness:    Raúl Marie is a pleasant 69 year old patient with past medical history significant for NSTEMI secondary to multivessel SCAD diagnosed on coronary angiogram 1/4/2023, fibromuscular dysplasia, and IgA nephropathy with CKD and aortopathy.    The patient follows closely with specialist at [place] for both his SCAD and FMD.  He was last seen in March of this year at which time he was generally doing well.  His cholesterol was well-controlled.  He was taken off his statin.  His LDL subsequently increased from 66 to 121 mg/dL. He was placed back on atorvastatin 10 mg daily. Repeat lipid panel 6/2025 with LDL of 63.     More recently, he was experiencing some atypical chest discomfort. He reported a few episodes of chest discomfort for 1-2 days following more rigorous activity.  He was referred for stress cardiac MRI that was negative for myocardial ischemia.  There is a small area of anterior infarct, consistent with previous heart attack.  LV function was normal.    Today he returns for follow-up.  He is doing well from a cardiopulmonary standpoint without any concerning symptoms.  He is walking on most days with intermittent jogging intervals as well as some moderate resistance training.  He denies any recurrent chest discomfort.    His blood pressure is well-controlled.  His weight is stable.           Assessment and Plan:     ASSESSMENT: Pertinent issues addressed at this visit     NSTEMI 2/2 SCAD diagnosed on coronary angiogram 1/2023.  No concerning cardiovascular symptoms at this time.  He is  exercising and feeling well.  Managed with DAPT, Toprol-XL, and Imdur.  He follows with SCAD specialist at [facility].   Fibromuscular dysplasia.  Involving the carotid arteries, renal artery and possibly vertebral arteries.  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  He follows closely with vascular medicine at [facility].  Hyperlipidemia. Currently on atorvastatin 10 mg daily with good control.    Hypertension.  Well-controlled on current regimen.      PLAN:     Overall Raúl is doing well from a cardiovascular standpoint, he has no concerning symptoms.  We again discussed the appropriate types of exercise to avoid heavy lifting.  He will continue his current medication regimen, his blood pressure looks excellent and his cholesterol is well-controlled.  He will follow-up at [facility] and with Dr. Muniz in 1 years time.         Vesta Piña, SHYANNE, APRN, CNP  Page: [phone] (8a-5p M-F)    Orders this Visit:  Orders Placed This Encounter   Procedures     Lipid Profile     ALT     Follow-Up with Cardiology     Orders Placed This Encounter   Medications     isosorbide mononitrate (IMDUR) 60 MG 24 hr tablet     Sig: Take 1 tablet (60 mg) by mouth daily.     Dispense:  90 tablet     Refill:  3     metoprolol succinate ER (TOPROL XL) 25 MG 24 hr tablet     Sig: Take 1 tablet (25 mg) by mouth daily.     Dispense:  90 tablet     Refill:  4     atorvastatin (LIPITOR) 20 MG tablet     Sig: Take 0.5 tablets (10 mg) by mouth daily.     Dispense:  90 tablet     Refill:  3     Medications Discontinued During This Encounter   Medication Reason     isosorbide mononitrate (IMDUR) 60 MG 24 hr tablet Reorder (No AVS)     metoprolol succinate ER (TOPROL XL) 25 MG 24 hr tablet Reorder (No AVS)     atorvastatin (LIPITOR) 20 MG tablet Reorder (No AVS)     ketorolac (ACULAR) 0.5 % ophthalmic solution Therapy completed (No AVS)     latanoprost  "(XALATAN) 0.005 % ophthalmic solution Therapy completed (No AVS)     ofloxacin (OCUFLOX) 0.3 % ophthalmic solution Therapy completed (No AVS)       Today's clinic visit entailed:  Review of the result(s) of each unique test - cardiac MRI, labs, EKG  Ordering of each unique test  Prescription drug management  35 minutes spent by me on the date of the encounter doing chart review, review of test results, interpretation of tests, patient visit, and documentation   Provider  Link to [facility] Help Grid     The level of medical decision making during this visit was of moderate complexity.           Review of Systems:     Review of Systems:  Skin:        Eyes:       ENT:       Respiratory:  Positive for sleep apnea, CPAP  Cardiovascular:  Negative, palpitations, chest pain, syncope or near-syncope, cyanosis, lightheadedness, dizziness, fatigue, edema    Gastroenterology:      Genitourinary:       Musculoskeletal:       Neurologic:       Psychiatric:       Heme/Lymph/Imm:       Endocrine:                 Physical Exam:   Vitals: /72   Pulse 60   Ht 1.702 m (5' 7\")   Wt 70 kg (154 lb 4.8 oz)   BMI 24.17 kg/m    Constitutional:  cooperative        Skin:  warm and dry to the touch        Head:  normocephalic        Eyes:  pupils equal and round        ENT:  not assessed this visit        Neck:  JVP normal        Chest:  normal breath sounds, clear to auscultation, normal A-P diameter, normal symmetry, normal respiratory excursion, no use of accessory muscles        Cardiac: regular rhythm, normal S1 and S2, no murmurs, gallops or rubs detected                  Abdomen:  abdomen soft        Vascular: pulses full and equal                                      Extremities and Back:  no edema        Neurological:  affect appropriate, no gross motor deficits             Medications:     Current Outpatient Medications   Medication Sig Dispense Refill     aspirin (ASA) 81 MG EC tablet Take 1 tablet (81 mg) by mouth daily 100 " tablet 0     atorvastatin (LIPITOR) 20 MG tablet Take 0.5 tablets (10 mg) by mouth daily. 90 tablet 3     Cholecalciferol (D 94465) 250 MCG (57265 UT) CAPS Take 0.5 tablets by mouth daily.       clopidogrel (PLAVIX) 75 MG tablet Take 1 tablet (75 mg) by mouth daily*       cyclobenzaprine (FLEXERIL) 10 MG tablet Take 10 mg by mouth.        MG tablet TAKE 1 TABLET BY MOUTH EVERY SIX HOURS AS NEEDED FOR PAIN*       isosorbide mononitrate (IMDUR) 60 MG 24 hr tablet Take 1 tablet (60 mg) by mouth daily. 90 tablet 3     magnesium carbonate (MAGONATE) 200 mg/ml liquid Take 5 mLs by mouth.       MAGNESIUM PO Take 400 mg by mouth.       methocarbamol (ROBAXIN) 500 MG tablet TAKE 1 TABLET BY MOUTH EVERY SIX HOURS AS NEEDED FOR PAIN*       metoprolol succinate ER (TOPROL XL) 25 MG 24 hr tablet Take 1 tablet (25 mg) by mouth daily. 90 tablet 4     nitroGLYcerin (NITROSTAT) 0.4 MG sublingual tablet For chest pain place 1 tablet under the tongue every 5 minutes for 3 doses. If symptoms persist 5 minutes after 1st dose call 911. 28 tablet 1     psyllium (METAMUCIL/KONSYL) Packet Take 1 packet by mouth daily 30 packet 0     senna-docusate (SENOKOT-S/PERICOLACE) 8.6-50 MG tablet Take 1-2 tablets by mouth daily.       tamsulosin (FLOMAX) 0.4 MG capsule Take 0.4 mg by mouth daily       nystatin (MYCOSTATIN) 074024 UNIT/GM external cream apply 1 application to the affected corners of the mouth and lips topically twice a day for 14 days.* (Patient not taking: Reported on 7/21/2025)       prednisoLONE acetate (PRED FORTE) 1 % ophthalmic suspension Instill 1 drop into affected eye four times a day for 1 week, then two times a day for 1 week then once daily for 1 week* (Patient not taking: Reported on 7/21/2025)       saline nasal (AYR SALINE) GEL topical gel 1 Application. (Patient not taking: Reported on 7/21/2025)       timolol hemihydrate (BETIMOL) 0.5 % ophthalmic solution 1 drop. (Patient not taking: Reported on 7/21/2025)          Family History   Problem Relation Age of Onset     Diabetes Father      Colon Cancer Paternal Grandmother        Social History     Socioeconomic History     Marital status: Single     Spouse name: Not on file     Number of children: Not on file     Years of education: Not on file     Highest education level: Not on file   Occupational History     Not on file   Tobacco Use     Smoking status: Never     Smokeless tobacco: Never   Substance and Sexual Activity     Alcohol use: Yes     Comment: occasional     Drug use: Not Currently     Types: Marijuana     Sexual activity: Not on file   Other Topics Concern     Not on file   Social History Narrative     Not on file     Social Drivers of Health     Financial Resource Strain: Medium Risk (2/1/2025)    Received from [facility]    Financial Resource Strain      Difficulty of Paying Living Expenses: Not on file      Difficulty of Paying Living Expenses: 3   Food Insecurity: No Food Insecurity (3/23/2025)    Received from [facility]    Hunger Vital Sign      Worried About Running Out of Food in the Last Year: Never true      Ran Out of Food in the Last Year: Never true   Transportation Needs: No Transportation Needs (3/23/2025)    Received from [facility]    PRAPARE - Transportation      Lack of Transportation (Medical): No      Lack of Transportation (Non-Medical): No   Physical Activity: Sufficiently Active (3/23/2025)    Received from [facility]    Exercise Vital Sign      Days of Exercise per Week: 7 days      Minutes of Exercise per Session: 40 min   Stress: Not on File (9/8/2021)    Received from [facility]    Stress      Stress: 0   Social Connections: Not on File (9/18/2024)    Received from [facility]    Social Connections      Connectedness: 0   Interpersonal Safety: Not on file   Housing Stability: Low Risk  (3/23/2025)    Received from [facility]    Housing Stability      What is your living situation today?: I have a steady  place to live            Past Medical History:     Past Medical History:   Diagnosis Date     BPH (benign prostatic hyperplasia)      Glaucoma      Hemorrhoids, unspecified hemorrhoid type      Hernia, abdominal      IgA nephropathy      Mumps               Past Surgical History:     Past Surgical History:   Procedure Laterality Date     APPENDECTOMY       COLONOSCOPY N/A 10/07/2022    Procedure: COLONOSCOPY, WITH POLYPECTOMY AND BIOPSY;  Surgeon: Sylvie Jj MD;  Location:  GI     CV CORONARY ANGIOGRAM N/A 01/04/2023    Procedure: Coronary Angiogram;  Surgeon: Jose Elias Wahl MD;  Location:  HEART CARDIAC CATH LAB     ESOPHAGOSCOPY, GASTROSCOPY, DUODENOSCOPY (EGD), COMBINED N/A 10/07/2022    Procedure: ESOPHAGOGASTRODUODENOSCOPY, WITH BIOPSY;  Surgeon: Sylvie Jj MD;  Location:  GI              Allergies:   Chlorpheniramine-phenylephrine, Ferrous sulfate, Nuts, and Wheat extract       Data:   All laboratory data reviewed:    Recent Labs   Lab Test 06/09/25  1103 12/16/24  1027 07/07/23  1410 01/04/23  1153 10/05/22  1802   LDL 63 66 62   < >  --    HDL 51 56 47   < >  --    NHDL 77 74 71   < >  --    CHOL 128 130 118   < >  --    TRIG 69 40 44   < >  --    IRON  --   --   --   --  8*   FEB  --   --   --   --  494*   IRONSAT  --   --   --   --  2*   JANNY  --   --   --   --  3*    < > = values in this interval not displayed.       Lab Results   Component Value Date    WBC 7.1 05/01/2023    WBC 8.4 01/11/2011    RBC 4.50 05/01/2023    RBC 5.10 01/11/2011    HGB 14.0 05/01/2023    HGB 15.1 01/11/2011    HCT 41.6 05/01/2023    HCT 44.6 01/11/2011    MCV 92 05/01/2023    MCV 88 01/11/2011    MCH 31.1 05/01/2023    MCH 29.6 01/11/2011    MCHC 33.7 05/01/2023    MCHC 33.9 01/11/2011    RDW 14.0 05/01/2023    RDW 13.9 01/11/2011     05/01/2023     01/11/2011       Lab Results   Component Value Date     12/16/2024     01/11/2011    POTASSIUM 4.6 12/16/2024    POTASSIUM 4.0  01/06/2023    POTASSIUM 4.0 01/11/2011    CHLORIDE 103 12/16/2024    CHLORIDE 101 12/29/2023    CHLORIDE 105 01/11/2011    CO2 27 12/16/2024    CO2 26 01/06/2023    CO2 25 01/11/2011    ANIONGAP 9 12/16/2024    ANIONGAP 8 01/06/2023    ANIONGAP 10 01/11/2011    GLC 99 12/16/2024    GLC 96 01/06/2023    GLC 95 01/11/2011    BUN 23.6 (H) 12/16/2024    BUN 20 01/06/2023    BUN 24 01/11/2011    CR 1.31 (H) 12/16/2024    CR 1.06 01/11/2011    GFRESTIMATED 59 (L) 12/16/2024    GFRESTIMATED 73 01/11/2011    GFRESTBLACK 88 01/11/2011    SONA 9.6 12/16/2024    SONA 9.3 01/11/2011      Lab Results   Component Value Date    AST 27 01/04/2023    AST 27 01/11/2011    ALT 25 06/09/2025    ALT 41 01/11/2011       Lab Results   Component Value Date    A1C 4.9 01/04/2023       Lab Results   Component Value Date    INR 1.11 10/13/2022    INR 1.10 10/05/2022         Thank you for allowing me to participate in the care of your patient.      Sincerely,     Vesta Piña, [facility]  cc:   Justo Muniz MD  [address]  [place] [address]

## 2025-07-21 NOTE — PROGRESS NOTES
Cardiology Clinic Progress Note  Raúl Marie MRN# [id number]   YOB: 1955 Age: 69 year old     Primary cardiologist: Dr. Muniz    History of presenting illness:    Raúl Marie is a pleasant 69 year old patient with past medical history significant for NSTEMI secondary to multivessel SCAD diagnosed on coronary angiogram 1/4/2023, fibromuscular dysplasia, and IgA nephropathy with CKD and aortopathy.    The patient follows closely with specialist at [facility] for both his SCAD and FMD.  He was last seen in March of this year at which time he was generally doing well.  His cholesterol was well-controlled.  He was taken off his statin.  His LDL subsequently increased from 66 to 121 mg/dL. He was placed back on atorvastatin 10 mg daily. Repeat lipid panel 6/2025 with LDL of 63.     More recently, he was experiencing some atypical chest discomfort. He reported a few episodes of chest discomfort for 1-2 days following more rigorous activity.  He was referred for stress cardiac MRI that was negative for myocardial ischemia.  There is a small area of anterior infarct, consistent with previous heart attack.  LV function was normal.    Today he returns for follow-up.  He is doing well from a cardiopulmonary standpoint without any concerning symptoms.  He is walking on most days with intermittent jogging intervals as well as some moderate resistance training.  He denies any recurrent chest discomfort.    His blood pressure is well-controlled.  His weight is stable.           Assessment and Plan:     ASSESSMENT: Pertinent issues addressed at this visit     NSTEMI 2/2 SCAD diagnosed on coronary angiogram 1/2023.  No concerning cardiovascular symptoms at this time.  He is exercising and feeling well.  Managed with DAPT, Toprol-XL, and Imdur.  He follows with SCAD specialist at [facility].   Fibromuscular dysplasia.  Involving the carotid arteries, renal artery and possibly vertebral arteries.  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  He follows closely with vascular medicine at [place].  Hyperlipidemia. Currently on atorvastatin 10 mg daily with good control.    Hypertension.  Well-controlled on current regimen.      PLAN:     Overall Raúl is doing well from a cardiovascular standpoint, he has no concerning symptoms.  We again discussed the appropriate types of exercise to avoid heavy lifting.  He will continue his current medication regimen, his blood pressure looks excellent and his cholesterol is well-controlled.  He will follow-up at [facility] and with Dr. Muniz in 1 years time.         Vesta Piña, DNP, APRN, CNP  Page: [phone] (8a-5p M-F)    Orders this Visit:  Orders Placed This Encounter   Procedures    Lipid Profile    ALT    Follow-Up with Cardiology     Orders Placed This Encounter   Medications    isosorbide mononitrate (IMDUR) 60 MG 24 hr tablet     Sig: Take 1 tablet (60 mg) by mouth daily.     Dispense:  90 tablet     Refill:  3    metoprolol succinate ER (TOPROL XL) 25 MG 24 hr tablet     Sig: Take 1 tablet (25 mg) by mouth daily.     Dispense:  90 tablet     Refill:  4    atorvastatin (LIPITOR) 20 MG tablet     Sig: Take 0.5 tablets (10 mg) by mouth daily.     Dispense:  90 tablet     Refill:  3     Medications Discontinued During This Encounter   Medication Reason    isosorbide mononitrate (IMDUR) 60 MG 24 hr tablet Reorder (No AVS)    metoprolol succinate ER (TOPROL XL) 25 MG 24 hr tablet Reorder (No AVS)    atorvastatin (LIPITOR) 20 MG tablet Reorder (No AVS)    ketorolac (ACULAR) 0.5 % ophthalmic solution Therapy completed (No AVS)    latanoprost (XALATAN) 0.005 % ophthalmic solution Therapy completed (No AVS)    ofloxacin (OCUFLOX) 0.3 % ophthalmic solution Therapy completed (No AVS)       Today's clinic visit entailed:  Review of the result(s) of each unique test - cardiac MRI, labs,  "EKG  Ordering of each unique test  Prescription drug management  35 minutes spent by me on the date of the encounter doing chart review, review of test results, interpretation of tests, patient visit, and documentation   Provider  Link to [facility] Help Grid     The level of medical decision making during this visit was of moderate complexity.           Review of Systems:     Review of Systems:  Skin:        Eyes:       ENT:       Respiratory:  Positive for sleep apnea, CPAP  Cardiovascular:  Negative, palpitations, chest pain, syncope or near-syncope, cyanosis, lightheadedness, dizziness, fatigue, edema    Gastroenterology:      Genitourinary:       Musculoskeletal:       Neurologic:       Psychiatric:       Heme/Lymph/Imm:       Endocrine:                 Physical Exam:   Vitals: /72   Pulse 60   Ht 1.702 m (5' 7\")   Wt 70 kg (154 lb 4.8 oz)   BMI 24.17 kg/m    Constitutional:  cooperative        Skin:  warm and dry to the touch        Head:  normocephalic        Eyes:  pupils equal and round        ENT:  not assessed this visit        Neck:  JVP normal        Chest:  normal breath sounds, clear to auscultation, normal A-P diameter, normal symmetry, normal respiratory excursion, no use of accessory muscles        Cardiac: regular rhythm, normal S1 and S2, no murmurs, gallops or rubs detected                  Abdomen:  abdomen soft        Vascular: pulses full and equal                                      Extremities and Back:  no edema        Neurological:  affect appropriate, no gross motor deficits             Medications:     Current Outpatient Medications   Medication Sig Dispense Refill    aspirin (ASA) 81 MG EC tablet Take 1 tablet (81 mg) by mouth daily 100 tablet 0    atorvastatin (LIPITOR) 20 MG tablet Take 0.5 tablets (10 mg) by mouth daily. 90 tablet 3    Cholecalciferol (D 83877) 250 MCG (43021 UT) CAPS Take 0.5 tablets by mouth daily.      clopidogrel (PLAVIX) 75 MG tablet Take 1 tablet (75 mg) " by mouth daily*      cyclobenzaprine (FLEXERIL) 10 MG tablet Take 10 mg by mouth.       MG tablet TAKE 1 TABLET BY MOUTH EVERY SIX HOURS AS NEEDED FOR PAIN*      isosorbide mononitrate (IMDUR) 60 MG 24 hr tablet Take 1 tablet (60 mg) by mouth daily. 90 tablet 3    magnesium carbonate (MAGONATE) 200 mg/ml liquid Take 5 mLs by mouth.      MAGNESIUM PO Take 400 mg by mouth.      methocarbamol (ROBAXIN) 500 MG tablet TAKE 1 TABLET BY MOUTH EVERY SIX HOURS AS NEEDED FOR PAIN*      metoprolol succinate ER (TOPROL XL) 25 MG 24 hr tablet Take 1 tablet (25 mg) by mouth daily. 90 tablet 4    nitroGLYcerin (NITROSTAT) 0.4 MG sublingual tablet For chest pain place 1 tablet under the tongue every 5 minutes for 3 doses. If symptoms persist 5 minutes after 1st dose call 911. 28 tablet 1    psyllium (METAMUCIL/KONSYL) Packet Take 1 packet by mouth daily 30 packet 0    senna-docusate (SENOKOT-S/PERICOLACE) 8.6-50 MG tablet Take 1-2 tablets by mouth daily.      tamsulosin (FLOMAX) 0.4 MG capsule Take 0.4 mg by mouth daily      nystatin (MYCOSTATIN) 876304 UNIT/GM external cream apply 1 application to the affected corners of the mouth and lips topically twice a day for 14 days.* (Patient not taking: Reported on 7/21/2025)      prednisoLONE acetate (PRED FORTE) 1 % ophthalmic suspension Instill 1 drop into affected eye four times a day for 1 week, then two times a day for 1 week then once daily for 1 week* (Patient not taking: Reported on 7/21/2025)      saline nasal (AYR SALINE) GEL topical gel 1 Application. (Patient not taking: Reported on 7/21/2025)      timolol hemihydrate (BETIMOL) 0.5 % ophthalmic solution 1 drop. (Patient not taking: Reported on 7/21/2025)         Family History   Problem Relation Age of Onset    Diabetes Father     Colon Cancer Paternal Grandmother        Social History     Socioeconomic History    Marital status: Single     Spouse name: Not on file    Number of children: Not on file    Years of  education: Not on file    Highest education level: Not on file   Occupational History    Not on file   Tobacco Use    Smoking status: Never    Smokeless tobacco: Never   Substance and Sexual Activity    Alcohol use: Yes     Comment: occasional    Drug use: Not Currently     Types: Marijuana    Sexual activity: Not on file   Other Topics Concern    Not on file   Social History Narrative    Not on file     Social Drivers of Health     Financial Resource Strain: Medium Risk (2/1/2025)    Received from [facility]    Financial Resource Strain     Difficulty of Paying Living Expenses: Not on file     Difficulty of Paying Living Expenses: 3   Food Insecurity: No Food Insecurity (3/23/2025)    Received from [facility]    Hunger Vital Sign     Worried About Running Out of Food in the Last Year: Never true     Ran Out of Food in the Last Year: Never true   Transportation Needs: No Transportation Needs (3/23/2025)    Received from [facility]    PRAPARE - Transportation     Lack of Transportation (Medical): No     Lack of Transportation (Non-Medical): No   Physical Activity: Sufficiently Active (3/23/2025)    Received from [facility]    Exercise Vital Sign     Days of Exercise per Week: 7 days     Minutes of Exercise per Session: 40 min   Stress: Not on File (9/8/2021)    Received from [facility]    Stress     Stress: 0   Social Connections: Not on File (9/18/2024)    Received from [employer]    Social Connections     Connectedness: 0   Interpersonal Safety: Not on file   Housing Stability: Low Risk  (3/23/2025)    Received from [facility]    Housing Stability     What is your living situation today?: I have a steady place to live            Past Medical History:     Past Medical History:   Diagnosis Date    BPH (benign prostatic hyperplasia)     Glaucoma     Hemorrhoids, unspecified hemorrhoid type     Hernia, abdominal     IgA nephropathy     Mumps               Past Surgical History:     Past  Surgical History:   Procedure Laterality Date    APPENDECTOMY      COLONOSCOPY N/A 10/07/2022    Procedure: COLONOSCOPY, WITH POLYPECTOMY AND BIOPSY;  Surgeon: Sylvie Jj MD;  Location:  GI    CV CORONARY ANGIOGRAM N/A 01/04/2023    Procedure: Coronary Angiogram;  Surgeon: Jose Elias Wahl MD;  Location:  HE[facility]ARDI[facility] CATH LAB    ESOPHAGOSCOPY, GASTROSCOPY, DUODENOSCOPY (EGD), COMBINED N/A 10/07/2022    Procedure: ESOPHAGOGASTRODUODENOSCOPY, WITH BIOPSY;  Surgeon: Sylvie Jj MD;  Location:  GI              Allergies:   Chlorpheniramine-phenylephrine, Ferrous sulfate, Nuts, and Wheat extract       Data:   All laboratory data reviewed:    Recent Labs   Lab Test 06/09/25  1103 12/16/24  1027 07/07/23  1410 01/04/23  1153 10/05/22  1802   LDL 63 66 62   < >  --    HDL 51 56 47   < >  --    NHDL 77 74 71   < >  --    CHOL 128 130 118   < >  --    TRIG 69 40 44   < >  --    IRON  --   --   --   --  8*   FEB  --   --   --   --  494*   IRONSAT  --   --   --   --  2*   JANNY  --   --   --   --  3*    < > = values in this interval not displayed.       Lab Results   Component Value Date    WBC 7.1 05/01/2023    WBC 8.4 01/11/2011    RBC 4.50 05/01/2023    RBC 5.10 01/11/2011    HGB 14.0 05/01/2023    HGB 15.1 01/11/2011    HCT 41.6 05/01/2023    HCT 44.6 01/11/2011    MCV 92 05/01/2023    MCV 88 01/11/2011    MCH 31.1 05/01/2023    MCH 29.6 01/11/2011    MCHC 33.7 05/01/2023    MCHC 33.9 01/11/2011    RDW 14.0 05/01/2023    RDW 13.9 01/11/2011     05/01/2023     01/11/2011       Lab Results   Component Value Date     12/16/2024     01/11/2011    POTASSIUM 4.6 12/16/2024    POTASSIUM 4.0 01/06/2023    POTASSIUM 4.0 01/11/2011    CHLORIDE 103 12/16/2024    CHLORIDE 101 12/29/2023    CHLORIDE 105 01/11/2011    CO2 27 12/16/2024    CO2 26 01/06/2023    CO2 25 01/11/2011    ANIONGAP 9 12/16/2024    ANIONGAP 8 01/06/2023    ANIONGAP 10 01/11/2011    GLC 99 12/16/2024    GLC 96  01/06/2023    GLC 95 01/11/2011    BUN 23.6 (H) 12/16/2024    BUN 20 01/06/2023    BUN 24 01/11/2011    CR 1.31 (H) 12/16/2024    CR 1.06 01/11/2011    GFRESTIMATED 59 (L) 12/16/2024    GFRESTIMATED 73 01/11/2011    GFRESTBLACK 88 01/11/2011    SONA 9.6 12/16/2024    SONA 9.3 01/11/2011      Lab Results   Component Value Date    AST 27 01/04/2023    AST 27 01/11/2011    ALT 25 06/09/2025    ALT 41 01/11/2011       Lab Results   Component Value Date    A1C 4.9 01/04/2023       Lab Results   Component Value Date    INR 1.11 10/13/2022    INR 1.10 10/05/2022

## 2025-07-21 NOTE — PATIENT INSTRUCTIONS
Today's Plan:     - Follow-up with Dr. Muniz in 1 year, sooner if needed.      If you have questions or concerns please call my nurse team:  Selene RN ([phone]) Deana RN ([phone]) or Magdalene RN ([phone])    After Hours: [phone], option #2, ask for cardiologist on-call    Scheduling phone number: [phone]    Reminder: Please bring in all current medications, over the counter supplements and vitamin bottles to your next appointment.-    It was a pleasure seeing you today!     Vesta Piña, CLARICE  7/21/2025    -

## (undated) DEVICE — TOTE ANGIO CORP PC15AT SAN32CC83O

## (undated) DEVICE — MANIFOLD KIT ANGIO AUTOMATED 014613

## (undated) DEVICE — INTRO GLIDESHEATH SLENDER 6FR 10X45CM 60-1060

## (undated) DEVICE — CATH DIAGNOSTIC RADIAL 5FR TIG 4.0

## (undated) DEVICE — DEFIB PRO-PADZ LVP LQD GEL ADULT 8900-2105-01

## (undated) DEVICE — KIT HAND CONTROL ANGIOTOUCH ACIST 65CM AT-P65

## (undated) DEVICE — SLEEVE TR BAND RADIAL COMPRESSION DEVICE 24CM TRB24-REG

## (undated) RX ORDER — HEPARIN SODIUM 200 [USP'U]/100ML
INJECTION, SOLUTION INTRAVENOUS
Status: DISPENSED
Start: 2023-01-04

## (undated) RX ORDER — VERAPAMIL HYDROCHLORIDE 2.5 MG/ML
INJECTION, SOLUTION INTRAVENOUS
Status: DISPENSED
Start: 2023-01-04

## (undated) RX ORDER — LIDOCAINE HYDROCHLORIDE 20 MG/ML
JELLY TOPICAL
Status: DISPENSED
Start: 2022-10-07

## (undated) RX ORDER — NITROGLYCERIN 5 MG/ML
VIAL (ML) INTRAVENOUS
Status: DISPENSED
Start: 2023-01-04

## (undated) RX ORDER — REGADENOSON 0.08 MG/ML
INJECTION, SOLUTION INTRAVENOUS
Status: DISPENSED
Start: 2025-06-09

## (undated) RX ORDER — CLOPIDOGREL 300 MG/1
TABLET, FILM COATED ORAL
Status: DISPENSED
Start: 2023-01-04

## (undated) RX ORDER — FENTANYL CITRATE 50 UG/ML
INJECTION, SOLUTION INTRAMUSCULAR; INTRAVENOUS
Status: DISPENSED
Start: 2022-10-07

## (undated) RX ORDER — LIDOCAINE HYDROCHLORIDE 10 MG/ML
INJECTION, SOLUTION EPIDURAL; INFILTRATION; INTRACAUDAL; PERINEURAL
Status: DISPENSED
Start: 2023-01-04

## (undated) RX ORDER — HEPARIN SODIUM 1000 [USP'U]/ML
INJECTION, SOLUTION INTRAVENOUS; SUBCUTANEOUS
Status: DISPENSED
Start: 2023-01-04

## (undated) RX ORDER — FENTANYL CITRATE 50 UG/ML
INJECTION, SOLUTION INTRAMUSCULAR; INTRAVENOUS
Status: DISPENSED
Start: 2023-01-04